# Patient Record
Sex: FEMALE | Race: BLACK OR AFRICAN AMERICAN | NOT HISPANIC OR LATINO | Employment: FULL TIME | URBAN - METROPOLITAN AREA
[De-identification: names, ages, dates, MRNs, and addresses within clinical notes are randomized per-mention and may not be internally consistent; named-entity substitution may affect disease eponyms.]

---

## 2024-02-05 ENCOUNTER — APPOINTMENT (OUTPATIENT)
Dept: GENERAL RADIOLOGY | Facility: HOSPITAL | Age: 54
End: 2024-02-05
Payer: COMMERCIAL

## 2024-02-05 ENCOUNTER — HOSPITAL ENCOUNTER (EMERGENCY)
Facility: HOSPITAL | Age: 54
Discharge: HOME | End: 2024-02-05
Attending: EMERGENCY MEDICINE | Admitting: EMERGENCY MEDICINE
Payer: COMMERCIAL

## 2024-02-05 VITALS
OXYGEN SATURATION: 100 % | DIASTOLIC BLOOD PRESSURE: 75 MMHG | HEART RATE: 56 BPM | TEMPERATURE: 98.1 F | RESPIRATION RATE: 18 BRPM | HEIGHT: 65 IN | SYSTOLIC BLOOD PRESSURE: 141 MMHG | BODY MASS INDEX: 31.92 KG/M2 | WEIGHT: 191.58 LBS

## 2024-02-05 DIAGNOSIS — M54.42 ACUTE LEFT-SIDED LOW BACK PAIN WITH LEFT-SIDED SCIATICA: Primary | ICD-10-CM

## 2024-02-05 PROCEDURE — 96372 THER/PROPH/DIAG INJ SC/IM: CPT

## 2024-02-05 PROCEDURE — 99282 EMERGENCY DEPT VISIT SF MDM: CPT

## 2024-02-05 PROCEDURE — 73502 X-RAY EXAM HIP UNI 2-3 VIEWS: CPT

## 2024-02-05 PROCEDURE — 25010000002 KETOROLAC TROMETHAMINE PER 15 MG

## 2024-02-05 RX ORDER — KETOROLAC TROMETHAMINE 30 MG/ML
60 INJECTION, SOLUTION INTRAMUSCULAR; INTRAVENOUS ONCE
Status: COMPLETED | OUTPATIENT
Start: 2024-02-05 | End: 2024-02-05

## 2024-02-05 RX ORDER — OXYCODONE AND ACETAMINOPHEN TABLETS 5; 300 MG/1; MG/1
1 TABLET ORAL EVERY 6 HOURS PRN
COMMUNITY

## 2024-02-05 RX ORDER — HYDROCHLOROTHIAZIDE 12.5 MG/1
12.5 TABLET ORAL DAILY
COMMUNITY

## 2024-02-05 RX ORDER — MORPHINE SULFATE 15 MG/1
15 TABLET ORAL EVERY 4 HOURS PRN
COMMUNITY

## 2024-02-05 RX ORDER — METHYLPREDNISOLONE 4 MG/1
TABLET ORAL
Qty: 21 TABLET | Refills: 0 | Status: SHIPPED | OUTPATIENT
Start: 2024-02-05 | End: 2024-02-10

## 2024-02-05 RX ORDER — TRIAMCINOLONE ACETONIDE 5 MG/G
1 CREAM TOPICAL 3 TIMES DAILY
COMMUNITY

## 2024-02-05 RX ADMIN — KETOROLAC TROMETHAMINE 60 MG: 30 INJECTION, SOLUTION INTRAMUSCULAR at 18:43

## 2024-02-05 NOTE — ED PROVIDER NOTES
Time: 5:09 PM EST  Date of encounter:  2024  Independent Historian/Clinical History and Information was obtained by:   Patient    History is limited by: N/A    Chief Complaint   Patient presents with    Leg Pain     left hip and leg pain causing numbness today. denies any trauma.pt ambulating in triage.          History of Present Illness:  Patient is a 54 y.o. year old female who presents to the emergency department for evaluation of left hip pain and lower back pain that started today.  Patient states the pain radiates down the posterior leg down into her foot.  Has history of herniated disc.  denies fall or injury.  Patient denies numbness or tingling, no saddle anesthesia.      Patient Care Team  Primary Care Provider: Provider, No Known    Past Medical History:     No Known Allergies  Past Medical History:   Diagnosis Date    Chronic pain     Eczema     Hypertension      Past Surgical History:   Procedure Laterality Date     SECTION      HYSTERECTOMY       No family history on file.    Home Medications:  Prior to Admission medications    Not on File        Social History:          Review of Systems:  Review of Systems   Constitutional:  Negative for fever.   HENT:  Negative for sore throat.    Eyes: Negative.    Respiratory:  Negative for cough and shortness of breath.    Cardiovascular:  Negative for chest pain.   Gastrointestinal:  Negative for abdominal pain, diarrhea and vomiting.   Genitourinary:  Negative for dysuria.   Musculoskeletal:  Positive for arthralgias and back pain. Negative for neck pain.   Skin:  Negative for rash.   Allergic/Immunologic: Negative.    Neurological:  Negative for weakness, numbness and headaches.   Hematological: Negative.    Psychiatric/Behavioral: Negative.     All other systems reviewed and are negative.       Physical Exam:  /75 (BP Location: Left arm, Patient Position: Sitting)   Pulse 56   Temp 98.1 °F (36.7 °C) (Oral)   Resp 18   Ht 163.8 cm  "(64.5\")   Wt 86.9 kg (191 lb 9.3 oz)   SpO2 100%   BMI 32.38 kg/m²         Physical Exam  Vitals and nursing note reviewed.   Constitutional:       General: She is not in acute distress.     Appearance: Normal appearance. She is not toxic-appearing.   HENT:      Head: Normocephalic and atraumatic.      Jaw: There is normal jaw occlusion.      Mouth/Throat:      Mouth: Mucous membranes are moist.   Eyes:      General: Lids are normal.      Extraocular Movements: Extraocular movements intact.      Conjunctiva/sclera: Conjunctivae normal.      Pupils: Pupils are equal, round, and reactive to light.   Cardiovascular:      Rate and Rhythm: Normal rate and regular rhythm.      Pulses: Normal pulses.      Heart sounds: Normal heart sounds.   Pulmonary:      Effort: Pulmonary effort is normal. No respiratory distress.      Breath sounds: Normal breath sounds. No wheezing or rhonchi.   Abdominal:      General: Abdomen is flat. There is no distension.      Palpations: Abdomen is soft.      Tenderness: There is no abdominal tenderness. There is no guarding or rebound.   Musculoskeletal:         General: Normal range of motion.      Cervical back: Normal range of motion and neck supple.      Lumbar back: Tenderness present. No signs of trauma.      Left hip: Tenderness present. No deformity.      Right lower leg: No edema.      Left lower leg: No edema.   Skin:     General: Skin is warm and dry.   Neurological:      General: No focal deficit present.      Mental Status: She is alert and oriented to person, place, and time. Mental status is at baseline.   Psychiatric:         Mood and Affect: Mood normal.         Behavior: Behavior normal.                Procedures:  Procedures      Medical Decision Making:      Comorbidities that affect care:    Hypertension    External Notes reviewed:          The following orders were placed and all results were independently analyzed by me:  Orders Placed This Encounter   Procedures    XR " Hip With or Without Pelvis 2 - 3 View Left       Medications Given in the Emergency Department:  Medications   ketorolac (TORADOL) injection 60 mg (60 mg Intramuscular Given 2/5/24 1843)        ED Course:    The patient was initially evaluated in the triage area where orders were placed. The patient was later dispositioned by DENNIS Lira.      The patient was advised to stay for completion of workup which includes but is not limited to communication of labs and radiological results, reassessment and plan. The patient was advised that leaving prior to disposition by a provider could result in critical findings that are not communicated to the patient.     ED Course as of 02/05/24 2003 Mon Feb 05, 2024   1710 --- PROVIDER IN TRIAGE NOTE ---    The patient was evaluated by Francy galindo in triage. Orders were placed and the patient is currently awaiting disposition.    [AJ]      ED Course User Index  [AJ] Francy Morris PA-C       Labs:    Lab Results (last 24 hours)       ** No results found for the last 24 hours. **             Imaging:    XR Hip With or Without Pelvis 2 - 3 View Left    Result Date: 2/5/2024  PROCEDURE: XR HIP W OR WO PELVIS 2-3 VIEW LEFT  COMPARISON: None  INDICATIONS: pain x 1 day / pt denies injury or trauma / no surgery to [facility]  FINDINGS:  The sacrum, pelvis, and proximal femurs appear intact.  No fractures are visualized.  Mild to moderate degenerative change consistent with osteoarthritis is seen in the left hip.  Mild degenerative change is seen in the right hip.  No lytic or sclerotic bone lesions are seen.        Left hip series demonstrating mild to moderate degenerative change consistent with osteoarthritis.      DEN RUIZ MD       Electronically Signed and Approved By: DEN RUIZ MD on 2/05/2024 at 18:27                Differential Diagnosis and Discussion:      Back Pain: The patient presents with back pain. My differential diagnosis includes but is not  limited to acute spinal epidural abscess, acute spinal epidural bleed, cauda equina syndrome, abdominal aortic aneurysm, aortic dissection, kidney stone, pyelonephritis, musculoskeletal back pain, spinal fracture, and osteoarthritis.   Extremity Pain: Differential diagnosis includes but is not limited to soft tissue sprain, tendonitis, tendon injury, dislocation, fracture, deep vein thrombosis, arterial insufficiency, osteoarthritis, bursitis, and ligamentous damage.    All X-rays impressions were independently interpreted by me.    MDM     Amount and/or Complexity of Data Reviewed  Tests in the radiology section of CPT®: reviewed    The patient´s symptoms are consistent with musculoskeletal back pain. The patient is now resting comfortably, feels better, is alert, talkative, interactive and in no distress. The repeat examination is unremarkable and benign. The patient is neurologically intact and is ambulatory in the ED. The patient has no fever, no bowel or bladder incontinence, no saddle anesthesia, and is otherwise alert and well appearing. The history, physical exam, and diagnostics (if any) do not suggest the presence of acute spinal epidural abscess, acute spinal epidural bleed, cauda equina syndrome, abdominal aortic aneurysm, aortic dissection or other process requiring further testing, treatment or consultation in the emergency department. The vital signs have been stable. The patient's condition is stable and appropriate for discharge. The patient will pursue further outpatient evaluation with the primary care physician or other designated for consulting position as indicated in the discharge instructions.            Patient Care Considerations:    CONSULT: I considered consulting orthopedics, however no acute complications.      Consultants/Shared Management Plan:    None    Social Determinants of Health:    Patient is independent, reliable, and has access to care.       Disposition and Care  Coordination:    Discharged: The patient is suitable and stable for discharge with no need for consideration of admission.    I have explained the patient´s condition, diagnoses and treatment plan based on the information available to me at this time. I have answered questions and addressed any concerns. The patient has a good  understanding of the patient´s diagnosis, condition, and treatment plan as can be expected at this point. The vital signs have been stable. The patient´s condition is stable and appropriate for discharge from the emergency department.      The patient will pursue further outpatient evaluation with the primary care physician or other designated or consulting physician as outlined in the discharge instructions. They are agreeable to this plan of care and follow-up instructions have been explained in detail. The patient has received these instructions in written format and have expressed an understanding of the discharge instructions. The patient is aware that any significant change in condition or worsening of symptoms should prompt an immediate return to this or the closest emergency department or call to 911.  I have explained discharge medications and the need for follow up with the patient/caretakers. This was also printed in the discharge instructions. Patient was discharged with the following medications and follow up:      Medication List        New Prescriptions      diclofenac 50 MG EC tablet  Commonly known as: VOLTAREN  Take 1 tablet by mouth 3 (Three) Times a Day.     methylPREDNISolone 4 MG dose pack  Commonly known as: MEDROL  Take 6 tablets by mouth Daily for 1 day, THEN 5 tablets Daily for 1 day, THEN 4 tablets Daily for 1 day, THEN 3 tablets Daily for 1 day, THEN 2 tablets Daily for 1 day, THEN 1 tablet Daily for 1 day. Take as directed on package instructions.  Start taking on: February 5, 2024               Where to Get Your Medications        These medications were sent to  [facility] [id number] - [place], [place] - [address] AT [facility] [address] - [phone]  - [phone] FX  [address], [place] [address]      Phone: [phone]   diclofenac 50 MG EC tablet  methylPREDNISolone 4 MG dose pack      Provider, No Known  [facility]  [place] [address]    Call in 2 days         Final diagnoses:   Acute left-sided low back pain with left-sided sciatica        ED Disposition       ED Disposition   Discharge    Condition   Stable    Comment   --               This medical record created using voice recognition software.             Mary Jones APRN  02/05/24 2003

## 2024-02-29 ENCOUNTER — OFFICE VISIT (OUTPATIENT)
Dept: FAMILY MEDICINE CLINIC | Facility: CLINIC | Age: 54
End: 2024-02-29
Payer: COMMERCIAL

## 2024-02-29 ENCOUNTER — LAB (OUTPATIENT)
Dept: LAB | Facility: HOSPITAL | Age: 54
End: 2024-02-29
Payer: COMMERCIAL

## 2024-02-29 VITALS
DIASTOLIC BLOOD PRESSURE: 96 MMHG | HEART RATE: 43 BPM | HEIGHT: 65 IN | SYSTOLIC BLOOD PRESSURE: 140 MMHG | WEIGHT: 194.8 LBS | BODY MASS INDEX: 32.46 KG/M2 | OXYGEN SATURATION: 100 %

## 2024-02-29 DIAGNOSIS — I10 PRIMARY HYPERTENSION: ICD-10-CM

## 2024-02-29 DIAGNOSIS — Z13.29 SCREENING FOR THYROID DISORDER: ICD-10-CM

## 2024-02-29 DIAGNOSIS — Z11.59 NEED FOR HEPATITIS C SCREENING TEST: ICD-10-CM

## 2024-02-29 DIAGNOSIS — Z13.220 SCREENING FOR LIPID DISORDERS: ICD-10-CM

## 2024-02-29 DIAGNOSIS — Z00.00 ANNUAL PHYSICAL EXAM: Primary | ICD-10-CM

## 2024-02-29 DIAGNOSIS — Z12.31 ENCOUNTER FOR SCREENING MAMMOGRAM FOR MALIGNANT NEOPLASM OF BREAST: ICD-10-CM

## 2024-02-29 DIAGNOSIS — Z76.89 ENCOUNTER TO ESTABLISH CARE: ICD-10-CM

## 2024-02-29 DIAGNOSIS — Z00.00 ANNUAL PHYSICAL EXAM: ICD-10-CM

## 2024-02-29 DIAGNOSIS — G89.29 OTHER CHRONIC PAIN: ICD-10-CM

## 2024-02-29 DIAGNOSIS — Z12.11 SCREENING FOR COLON CANCER: ICD-10-CM

## 2024-02-29 LAB
ALBUMIN SERPL-MCNC: 4.2 G/DL (ref 3.5–5.2)
ALBUMIN/GLOB SERPL: 1.9 G/DL
ALP SERPL-CCNC: 48 U/L (ref 39–117)
ALT SERPL W P-5'-P-CCNC: 13 U/L (ref 1–33)
ANION GAP SERPL CALCULATED.3IONS-SCNC: 16 MMOL/L (ref 5–15)
AST SERPL-CCNC: 21 U/L (ref 1–32)
BASOPHILS # BLD AUTO: 0.04 10*3/MM3 (ref 0–0.2)
BASOPHILS NFR BLD AUTO: 0.8 % (ref 0–1.5)
BILIRUB SERPL-MCNC: 0.5 MG/DL (ref 0–1.2)
BUN SERPL-MCNC: 23 MG/DL (ref 6–20)
BUN/CREAT SERPL: 22.5 (ref 7–25)
CALCIUM SPEC-SCNC: 8.8 MG/DL (ref 8.6–10.5)
CHLORIDE SERPL-SCNC: 104 MMOL/L (ref 98–107)
CHOLEST SERPL-MCNC: 153 MG/DL (ref 0–200)
CO2 SERPL-SCNC: 23 MMOL/L (ref 22–29)
CREAT SERPL-MCNC: 1.02 MG/DL (ref 0.57–1)
DEPRECATED RDW RBC AUTO: 42.7 FL (ref 37–54)
EGFRCR SERPLBLD CKD-EPI 2021: 65.5 ML/MIN/1.73
EOSINOPHIL # BLD AUTO: 0.45 10*3/MM3 (ref 0–0.4)
EOSINOPHIL NFR BLD AUTO: 8.8 % (ref 0.3–6.2)
ERYTHROCYTE [DISTWIDTH] IN BLOOD BY AUTOMATED COUNT: 12.7 % (ref 12.3–15.4)
GLOBULIN UR ELPH-MCNC: 2.2 GM/DL
GLUCOSE SERPL-MCNC: 92 MG/DL (ref 65–99)
HCT VFR BLD AUTO: 36.2 % (ref 34–46.6)
HCV AB SER DONR QL: NORMAL
HDLC SERPL-MCNC: 61 MG/DL (ref 40–60)
HGB BLD-MCNC: 11.9 G/DL (ref 12–15.9)
IMM GRANULOCYTES # BLD AUTO: 0.01 10*3/MM3 (ref 0–0.05)
IMM GRANULOCYTES NFR BLD AUTO: 0.2 % (ref 0–0.5)
LDLC SERPL CALC-MCNC: 84 MG/DL (ref 0–100)
LDLC/HDLC SERPL: 1.4 {RATIO}
LYMPHOCYTES # BLD AUTO: 2.07 10*3/MM3 (ref 0.7–3.1)
LYMPHOCYTES NFR BLD AUTO: 40.4 % (ref 19.6–45.3)
MCH RBC QN AUTO: 30.3 PG (ref 26.6–33)
MCHC RBC AUTO-ENTMCNC: 32.9 G/DL (ref 31.5–35.7)
MCV RBC AUTO: 92.1 FL (ref 79–97)
MONOCYTES # BLD AUTO: 0.43 10*3/MM3 (ref 0.1–0.9)
MONOCYTES NFR BLD AUTO: 8.4 % (ref 5–12)
NEUTROPHILS NFR BLD AUTO: 2.13 10*3/MM3 (ref 1.7–7)
NEUTROPHILS NFR BLD AUTO: 41.4 % (ref 42.7–76)
NRBC BLD AUTO-RTO: 0 /100 WBC (ref 0–0.2)
PLATELET # BLD AUTO: 213 10*3/MM3 (ref 140–450)
PMV BLD AUTO: 12.1 FL (ref 6–12)
POTASSIUM SERPL-SCNC: 4.1 MMOL/L (ref 3.5–5.2)
PROT SERPL-MCNC: 6.4 G/DL (ref 6–8.5)
RBC # BLD AUTO: 3.93 10*6/MM3 (ref 3.77–5.28)
SODIUM SERPL-SCNC: 143 MMOL/L (ref 136–145)
T4 FREE SERPL-MCNC: 1.26 NG/DL (ref 0.93–1.7)
TRIGL SERPL-MCNC: 34 MG/DL (ref 0–150)
TSH SERPL DL<=0.05 MIU/L-ACNC: 0.51 UIU/ML (ref 0.27–4.2)
VLDLC SERPL-MCNC: 8 MG/DL (ref 5–40)
WBC NRBC COR # BLD AUTO: 5.13 10*3/MM3 (ref 3.4–10.8)

## 2024-02-29 PROCEDURE — 36415 COLL VENOUS BLD VENIPUNCTURE: CPT

## 2024-02-29 PROCEDURE — 86803 HEPATITIS C AB TEST: CPT

## 2024-02-29 PROCEDURE — 84439 ASSAY OF FREE THYROXINE: CPT

## 2024-02-29 PROCEDURE — 80050 GENERAL HEALTH PANEL: CPT

## 2024-02-29 PROCEDURE — 80061 LIPID PANEL: CPT

## 2024-02-29 RX ORDER — HYDROCHLOROTHIAZIDE 25 MG/1
25 TABLET ORAL DAILY
Qty: 30 TABLET | Refills: 0 | Status: SHIPPED | OUTPATIENT
Start: 2024-02-29

## 2024-02-29 RX ORDER — HYDROCHLOROTHIAZIDE 12.5 MG/1
12.5 TABLET ORAL DAILY
Qty: 30 TABLET | Refills: 0 | Status: SHIPPED | OUTPATIENT
Start: 2024-02-29 | End: 2024-02-29 | Stop reason: SDUPTHER

## 2024-03-13 ENCOUNTER — HOSPITAL ENCOUNTER (OUTPATIENT)
Dept: MAMMOGRAPHY | Facility: HOSPITAL | Age: 54
Discharge: HOME | End: 2024-03-13
Payer: COMMERCIAL

## 2024-03-13 DIAGNOSIS — Z00.00 ANNUAL PHYSICAL EXAM: ICD-10-CM

## 2024-03-13 DIAGNOSIS — Z12.31 ENCOUNTER FOR SCREENING MAMMOGRAM FOR MALIGNANT NEOPLASM OF BREAST: ICD-10-CM

## 2024-03-13 PROCEDURE — 77063 BREAST TOMOSYNTHESIS BI: CPT

## 2024-03-13 PROCEDURE — 77067 SCR MAMMO BI INCL CAD: CPT

## 2024-03-15 DIAGNOSIS — R92.8 ABNORMALITY OF RIGHT BREAST ON SCREENING MAMMOGRAM: Primary | ICD-10-CM

## 2024-03-27 ENCOUNTER — OFFICE VISIT (OUTPATIENT)
Dept: FAMILY MEDICINE CLINIC | Facility: CLINIC | Age: 54
End: 2024-03-27
Payer: COMMERCIAL

## 2024-03-27 VITALS
OXYGEN SATURATION: 98 % | DIASTOLIC BLOOD PRESSURE: 84 MMHG | WEIGHT: 190 LBS | BODY MASS INDEX: 31.65 KG/M2 | HEIGHT: 65 IN | HEART RATE: 64 BPM | SYSTOLIC BLOOD PRESSURE: 125 MMHG

## 2024-03-27 DIAGNOSIS — M79.675 PAIN IN TOE OF LEFT FOOT: ICD-10-CM

## 2024-03-27 DIAGNOSIS — G47.00 INSOMNIA, UNSPECIFIED TYPE: ICD-10-CM

## 2024-03-27 DIAGNOSIS — I10 PRIMARY HYPERTENSION: Primary | ICD-10-CM

## 2024-03-27 DIAGNOSIS — G47.9 RESTLESS SLEEPER: ICD-10-CM

## 2024-03-27 DIAGNOSIS — L60.8 TOENAIL DEFORMITY: ICD-10-CM

## 2024-03-27 PROCEDURE — 99214 OFFICE O/P EST MOD 30 MIN: CPT

## 2024-03-27 RX ORDER — HYDROCHLOROTHIAZIDE 25 MG/1
25 TABLET ORAL DAILY
Qty: 90 TABLET | Refills: 1 | Status: SHIPPED | OUTPATIENT
Start: 2024-03-27

## 2024-03-27 NOTE — PROGRESS NOTES
Chief Complaint  Hypertension, Toe Pain, and sleep issues    SUBJECTIVE  Sigrid Morgan presents to [facility]    History of Present Illness  Patient is here for 1m f/u for htn, pt was instructed to restart hctz 25 mg and check bp at home. Pt states that she is doing well at this time. BP in office today 125/84.  Denies any chest pain, shortness of air, headache, blurry vision.    Patient reports complaints of left great toe pain that has been there for a few days.  Patient reports she had a history of fracture in that foot and has always had some numbness and tingling in her foot and toe but her toenail has since turned black and is tender to the touch.  Patient denies any known injury.    Patient also reports that she has been consistent restless sleep.  Patient reports she is usually waking up about every 2 hours.  Patient reports has been checking her sleep on her smart watch and it is a score of 30.  Patient has never had sleep study done.  Patient denies any known snoring.    She has scheduled her mammogram and colonoscopy.  She has an appointment to establish with pain management today.        Past Medical History:   Diagnosis Date    Chronic pain     Eczema     Hypertension       History reviewed. No pertinent family history.   Past Surgical History:   Procedure Laterality Date     SECTION      HYSTERECTOMY          Current Outpatient Medications:     diclofenac (VOLTAREN) 50 MG EC tablet, Take 1 tablet by mouth 3 (Three) Times a Day., Disp: 180 tablet, Rfl: 0    hydroCHLOROthiazide 25 MG tablet, Take 1 tablet by mouth Daily., Disp: 30 tablet, Rfl: 0    Morphine (MSIR) 15 MG tablet, Take 1 tablet by mouth Every 4 (Four) Hours As Needed for Severe Pain. Pt not currently taking, Disp: , Rfl:     oxyCODONE-acetaminophen (LYNOX) 5-300 MG per tablet, Take 1 tablet by mouth Every 6 (Six) Hours As Needed for Severe Pain. Pt not currently taking, Disp: , Rfl:      "triamcinolone (KENALOG) 0.5 % cream, Apply 1 Application topically to the appropriate area as directed 3 (Three) Times a Day., Disp: , Rfl:     OBJECTIVE  Vital Signs:   /84 (BP Location: Left arm, Patient Position: Sitting, Cuff Size: Adult)   Pulse 64   Ht 163.8 cm (64.5\")   Wt 86.2 kg (190 lb)   SpO2 98%   BMI 32.11 kg/m²    Estimated body mass index is 32.11 kg/m² as calculated from the following:    Height as of this encounter: 163.8 cm (64.5\").    Weight as of this encounter: 86.2 kg (190 lb).     Wt Readings from Last 3 Encounters:   03/27/24 86.2 kg (190 lb)   02/29/24 88.4 kg (194 lb 12.8 oz)   02/05/24 86.9 kg (191 lb 9.3 oz)     BP Readings from Last 3 Encounters:   03/27/24 125/84   02/29/24 140/96   02/05/24 141/75       Physical Exam  Vitals reviewed.   Constitutional:       General: She is not in acute distress.     Appearance: She is not ill-appearing.   HENT:      Head: Normocephalic and atraumatic.   Eyes:      Conjunctiva/sclera: Conjunctivae normal.   Cardiovascular:      Rate and Rhythm: Normal rate and regular rhythm.      Heart sounds: Normal heart sounds.   Pulmonary:      Effort: Pulmonary effort is normal.      Breath sounds: Normal breath sounds.   Musculoskeletal:        Feet:    Feet:      Comments: Left great toenail black and tender to touch, no swelling or erythema noted  Skin:     General: Skin is warm and dry.   Neurological:      Mental Status: She is alert and oriented to person, place, and time.   Psychiatric:         Mood and Affect: Mood normal.         Behavior: Behavior normal.         Thought Content: Thought content normal.         Judgment: Judgment normal.          Result Review    Common labs          2/29/2024    08:24   Common Labs   Glucose 92    BUN 23    Creatinine 1.02    Sodium 143    Potassium 4.1    Chloride 104    Calcium 8.8    Albumin 4.2    Total Bilirubin 0.5    Alkaline Phosphatase 48    AST (SGOT) 21    ALT (SGPT) 13    WBC 5.13    Hemoglobin " 11.9    Hematocrit 36.2    Platelets 213    Total Cholesterol 153    Triglycerides 34    HDL Cholesterol 61    LDL Cholesterol  84        Mammo Screening Digital Tomosynthesis Bilateral With CAD    Result Date: 3/14/2024   Right breast asymmetries.   We have requested the patient's prior, outside mammography, an addendum documenting comparison will be issued when this is made available.  If this cannot be made available, then further evaluation with right diagnostic mammogram and possible right breast ultrasound would be recommended.   RECOMMENDATION(S):  COMPARISON TO PRIOR EXAMS REQUIRED. --An addendum report will be sent after prior exams are obtained and reviewed.   ADDITIONAL MAMMOGRAPHIC VIEWS REQUIRED: RIGHT BREAST  --NEED ADDITIONAL IMAGING EVALUATION.   ULTRASOUND: RIGHT BREAST  --NEED ADDITIONAL IMAGING EVALUATION.   BIRADS:  DIAGNOSTIC CATEGORY 0--INCOMPLETE: NEEDS ADDITIONAL IMAGING EVALUATION.   BREAST COMPOSITION: Heterogeneously dense,which may obscure small masses.  PLEASE NOTE:  A NORMAL MAMMOGRAM DOES NOT EXCLUDE THE POSSIBILITY OF BREAST CANCER. ANY CLINICALLY SUSPICIOUS PALPABLE LUMP SHOULD BE BIOPSIED.      OMARI GUNDERSON MD       Electronically Signed and Approved By: OMARI GUNDERSON MD on 3/14/2024 at 12:33             XR Hip With or Without Pelvis 2 - 3 View Left    Result Date: 2/5/2024    Left hip series demonstrating mild to moderate degenerative change consistent with osteoarthritis.      DEN RUIZ MD       Electronically Signed and Approved By: DEN RUIZ MD on 2/05/2024 at 18:27                 The above data has been reviewed by DENNIS Quinonez 03/27/2024 07:03 EDT.          Patient Care Team:  Camilla Mena APRN as PCP - General (Nurse Practitioner)            ASSESSMENT & PLAN    Diagnoses and all orders for this visit:    1. Primary hypertension (Primary)  Comments:  Stable on hydrochlorothiazide 25 mg, continue    2. Insomnia, unspecified type  -     Ambulatory  Referral to Sleep Medicine    3. Restless sleeper  -     Ambulatory Referral to Sleep Medicine    4. Pain in toe of left foot  -     Ambulatory Referral to Podiatry    5. Toenail deformity  -     Ambulatory Referral to Podiatry    Will refer to podiatry for evaluation of her toenail.  Will refer to sleep medicine for evaluation of restless sleep, potential sleep apnea.    Tobacco Use: High Risk (3/27/2024)    Patient History     Smoking Tobacco Use: Some Days     Smokeless Tobacco Use: Never     Passive Exposure: Current       Follow Up     Return in about 6 months (around 9/27/2024) for Next scheduled follow up.      Patient was given instructions and counseling regarding her condition or for health maintenance advice. Please see specific information pulled into the AVS if appropriate.   I have reviewed information obtained and documented by others and I have confirmed the accuracy of this documented note.    DENNIS Quinonez

## 2024-03-29 ENCOUNTER — OFFICE VISIT (OUTPATIENT)
Dept: SLEEP MEDICINE | Facility: HOSPITAL | Age: 54
End: 2024-03-29
Payer: COMMERCIAL

## 2024-03-29 VITALS
HEART RATE: 49 BPM | OXYGEN SATURATION: 99 % | HEIGHT: 65 IN | SYSTOLIC BLOOD PRESSURE: 125 MMHG | DIASTOLIC BLOOD PRESSURE: 77 MMHG | WEIGHT: 192.3 LBS | BODY MASS INDEX: 32.04 KG/M2

## 2024-03-29 DIAGNOSIS — F12.90 MARIJUANA USE: ICD-10-CM

## 2024-03-29 DIAGNOSIS — Z72.821 INADEQUATE SLEEP HYGIENE: ICD-10-CM

## 2024-03-29 DIAGNOSIS — R06.83 SNORING: ICD-10-CM

## 2024-03-29 DIAGNOSIS — R00.1 BRADYCARDIA: ICD-10-CM

## 2024-03-29 DIAGNOSIS — G89.29 OTHER CHRONIC PAIN: ICD-10-CM

## 2024-03-29 DIAGNOSIS — I10 ESSENTIAL HYPERTENSION: ICD-10-CM

## 2024-03-29 DIAGNOSIS — R06.81 WITNESSED EPISODE OF APNEA: ICD-10-CM

## 2024-03-29 DIAGNOSIS — R29.818 SUSPECTED SLEEP APNEA: Primary | ICD-10-CM

## 2024-03-29 DIAGNOSIS — E66.9 CLASS 1 OBESITY WITH BODY MASS INDEX (BMI) OF 32.0 TO 32.9 IN ADULT, UNSPECIFIED OBESITY TYPE, UNSPECIFIED WHETHER SERIOUS COMORBIDITY PRESENT: ICD-10-CM

## 2024-03-29 DIAGNOSIS — G47.19 EXCESSIVE DAYTIME SLEEPINESS: ICD-10-CM

## 2024-03-29 PROCEDURE — G0463 HOSPITAL OUTPT CLINIC VISIT: HCPCS

## 2024-03-29 RX ORDER — PREGABALIN 75 MG/1
CAPSULE ORAL
COMMUNITY
Start: 2024-03-27

## 2024-03-29 RX ORDER — DIPHENHYDRAMINE HCL 25 MG
25 CAPSULE ORAL EVERY 6 HOURS PRN
COMMUNITY

## 2024-03-29 RX ORDER — AMOXICILLIN 500 MG/1
CAPSULE ORAL
COMMUNITY
Start: 2024-03-29

## 2024-03-29 NOTE — PROGRESS NOTES
"  [facility]  [address] [place] [id number]  Phone: [phone]  Fax: [phone]      Sigrid Morgan  [id number]   1970  54 y.o.  female      Referring physician/provider and  PCP Camilla Mena APRN    Type of service: Initial Sleep Medicine Consult.  Date of service: 3/29/2024      Chief Complaint   Patient presents with    Witnessed Apnea    Snoring       History of present illness;  The patient was seen today on 3/29/2024 at [facility].    Thank you for asking to see Sigrid Morgan, 54 y.o. PMHx HTN, chronic pain ( lyrica 75 mg 1-2x today and medical marijuana - lifetime marijuana use).  The patient presents for initial evaluation of sleep sleep disordered breathing.  Patient  denies prior surgery namely tonsillectomy, nasal surgery or UPPP.       Loud snoring and witnessed apneas >1 year  Never had sleep study    Bradycardia in sleep clinic - states she feels good today no issues:   [facility] sleep clinic  /77   Pulse (!) 49   Ht 163.8 cm (64.5\")   Wt 87.2 kg (192 lb 4.8 oz)   SpO2 99%   BMI 32.50 kg/m²   Denies lightheadedness/dizziness/syncope/near syncope/vision changes  Denies any beta blocker therapy    -Recently moved from [place]: In the past she was on Morphine IR 15 mg q4hr and Oxycodone-APAP q6hr however no longer on any form of opoid therapy  Saw pain management recently and stated on lyrica 75 mg 1-2x/d   Denies any current opoid therapy    Denies any known cardiopulmonary conditions/neurologic disorders/neuromuscular disorders  Denies ever needing supplemental O2 at home  Denies any metal in head/neck/chest    Obstructive Sleep Apnea Screening: STOP-BANG Sleep Apnea Questionnaire. Reference: Ding F et al. Br J Anaesth, 2012.     Criterion    Yes    No  Do you SNORE loudly?   [x]   Yes  []   No   Do you often feel TIRED, fatigued, or sleepy during the day?    [x]   Yes  []   No  Has anyone OBSERVED you stop breathing during your " sleep?    [x]   Yes  []   No  Do you have or are you being treated for high blood PRESSURE?    [x]   Yes  []   No  BMI >32 kg/m2     [x]   Yes  []   No  AGE > 50 years    [x]   Yes  []   No  NECK circumference >16 inches / 40 cm    []   Yes  [x]   No  GENDER: male     []   Yes  [x]   No    IVET Probability:  []   1-2 - Low  []   3-4 - Intermediate  [x]   5-8 - High      Further Sleep History:    Bedtime: Weekdays 11 PM-midnight; weekends 9:30 PM-10 PM  Rise time: Weekdays 7-8 AM weekends 8-9 AM  Sleep latency: 30 minutes  Naps: Denies  Caffeine use: 2+ beverages per day      RLS Symptoms: No   Bruxism:No   Current sleep related gastroesophageal reflux symptoms:  No   Cataplexy:  No   Sleep Paralysis:  No   Hypnagogic or hypnopompic hallucinations: No   Parasomnias such as sleep walking or sleep eating No     Disclaimer Sleep History: The above sleep history is based on this sleep physician's in room encounter with the patient. Pre encounter self administered questionnaires are taken into consideration and discussed with patient for any discordance. The above documentation by this sleep physician is the most accurate clinical information determined by in room sleep physician encounter with patient.     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 Pre encounter self administered questionnaires are taken into consideration and discussed with patient for any discordance. The above documentation by this sleep physician is the most accurate clinical information determined by in room sleep physician encounter with patient.     Physical exam:  Vitals:    03/29/24 0900   BP: 125/77   Pulse: (!) 49   SpO2: 99%   Weight: 87.2 kg (192 lb 4.8 oz)   Height: 163.8 cm (64.5\")    Body mass index is 32.5 kg/m².   CONSTITUTIONAL:  Non-toxic, In no overt distress   Head: normocephalic   ENT: Mallampati class IV, + macroglossia, no septal defects   NECK:Neck Circumference: 14 inches,no nuchal rigidity  RESPIRATORY SYSTEM: Breath sounds are clear (no rales, no rhonchi, no wheezes), no accessory muscle use  CARDIOVASULAR SYSTEM: Heart sounds are regular rhythm and bradycardicl rate (peripheral pulse 58 bpm), no rub, no gallop, no edema  NEUROLOGICAL SYSTEM: Oriented x 3, No gross focal deficits   PSYCHIATRIC SYSTEM: Pleasant, Goal oriented, affect full range appropriate      Office notes from care team reviewed:    -3/27/2024 office visit PCP DENNIS Mena sleep maintenance issue, never had sleep study done.  BMI 32.11 kg/m² that visit.  Has an appointment to establish care with pain management.      Labs reviewed.  TSH          2/29/2024    08:24   TSH   TSH 0.505        - 2/29/2024  Bicarb 23      Assessment and plan:  Suspected sleep apnea [R29.818] patient's symptoms and examination is consistent with sleep apnea (G47.30). I have talked to the patient about the signs and symptoms of sleep apnea. In addition, I have also discussed pathophysiology of sleep apnea.  I also discussed the complications of untreated sleep apnea including effects on hypertension, diabetes mellitus and nonrestorative sleep with hypersomnia which can increase risk for motor vehicle accidents.  Untreated sleep apnea is also a risk factor for " development of atrial fibrillation, hypertension, insulin resistance and cerebrovascular accident.  Discussed in detail of various testing methods including home-based and lab based sleep studies.  Based on history and physical examination and other comorbidities the most appropriate study is Home Sleep Study.  The order for the sleep study is placed in [email or web address].  The test will be scheduled after approval from insurance. Treatment and management will be discussed after the test is completed.  Home Sleep Study.  The order for the sleep study is placed in [email or web address].  The test will be scheduled after approval from insurance. Treatment and management will be discussed after the test is completed. High pretest probability STOP-BANG 6/8, macroglossia, Mallampati is IV.  Home sleep study may rule in sleep apnea.  However, under patient's specific clinical circumstances home sleep study may not rule out sleep apnea.  If home sleep testing is negative must proceed with in laboratory polysomnography to definitively rule out sleep apnea (the prior was discussed with patient). Patient was given opportunity to ask questions and all the questions were answered.   Snoring (R06.83), snoring is the sound created by turbulent airflow vibrating upper airway soft tissue due to limitation of inspiratory airflow. I have also discussed factors affecting snoring including sleep deprivation, sleeping on the back and alcohol ingestion. To minimize snoring, patient is advised to have adequate sleep, sleep on the side and avoid alcohol and sedative medications before bedtime  Excessive daytime sleepiness .  Patient endorses subjective excessive daytime sleepiness with sleep physician encounter which was consistent with patient's pre-encounter self-administered [place] Sleepiness Scale of Total score: 19.  There are many causes for daytime excessive sleepiness including depression, shiftwork syndrome, and other medical disorders including heart, kidney  and liver failure.  From sleep disorders perspective this is sleep disordered breathing until proven otherwise. The most common cause of excessive sleepiness is due to sleep apnea with frequent awakenings during sleep time.  I have discussed safety of driving and to remain vigilant while driving; patient verbalized understanding of counseling.  -Identifying and treating any sleep disordered breathing may help with the symptoms  -Patient is on sedating medications namely brought to her attention to discuss with her prescribing physicians risk/benefits if excessive daytime sleepiness persists despite ruling out or identify/treating any sleep disordered breathing: Lyrica and medical marijuana which has been identified by International classification of sleep disorders as a potential cause of excessive daytime sleepiness  Obesity, patient's BMI is Body mass index is 32.5 kg/m².. I have discussed the relationship between weight and sleep apnea.There is direct correlation between weight and severity of sleep apnea.  Weight reduction is encouraged, as it is going to reduce the severity of sleep apnea. I have also discussed with the patient diet and exercise to achieve ideal body weight.  Inadequate sleep hygiene [Z72.821]  Counseled the patient lifestyle modifications as below to be applied especially night of any sleep study, the patient verbalized understanding of same. Follow up with PCP to reinforce my counseling towards healthy lifestyle modifications if sleep studies are negative.  Bradycardia, asymptomatic in sleep clinic.  Counseled ED/911 for alarm signs symptoms reviewed with patient.  Counseled to follow-up with PCP for further investigation.  Not on beta-blocker therapy.  Chronic pain, no longer on any form of opioid therapy.  HST would be of most appropriate first evaluation.  Plan as stated above.  See excessive daytime sleepiness.  Follow-up with prescribing physicians to discuss risk/benefits of current  medications.  HTN, Follow up with primary care physician for continued management. This medical condition would make the patient eligible for a trial of PAP therapy even if sleep study reveals mild severity sleep apnea.      I have also discussed with the patient the following  Sleep hygiene: Maintaining a regular bedtime and wake time, not to watch television or work in bed, limit caffeine-containing beverages before bed time and avoid naps during the day  Adequate amount of sleep.  Generally most people needs about 7 to 8 hours of sleep.      Return for 31 to 90 days after PAP setup with down load.  Patient's questions were answered      I once again thank you for asking me to see this patient in consultation and I have forwarded my opinion and treatment plan.  Please do not hesitate to call me if you have any questions.       EMR Dragon/Transcription disclaimer:   Much of this encounter note is an electronic transcription/translation of spoken language to printed text. The electronic translation of spoken language may permit erroneous, or at times, nonsensical words or phrases to be inadvertently transcribed; Although I have reviewed the note for such errors, some may still exist.     NPI #: [id number]    Gustavo Rodriguez, DO  Sleep Medicine  [facility]  03/29/24

## 2024-04-02 ENCOUNTER — TRANSCRIBE ORDERS (OUTPATIENT)
Dept: ADMINISTRATIVE | Facility: HOSPITAL | Age: 54
End: 2024-04-02
Payer: COMMERCIAL

## 2024-04-02 DIAGNOSIS — M54.17 RADICULOPATHY, LUMBOSACRAL REGION: Primary | ICD-10-CM

## 2024-04-05 ENCOUNTER — TRANSCRIBE ORDERS (OUTPATIENT)
Dept: CT IMAGING | Facility: HOSPITAL | Age: 54
End: 2024-04-05
Payer: COMMERCIAL

## 2024-04-05 DIAGNOSIS — M54.12 RADICULOPATHY, CERVICAL REGION: Primary | ICD-10-CM

## 2024-04-10 ENCOUNTER — HOSPITAL ENCOUNTER (OUTPATIENT)
Dept: OTHER | Facility: HOSPITAL | Age: 54
Discharge: HOME | End: 2024-04-10

## 2024-04-22 ENCOUNTER — OFFICE VISIT (OUTPATIENT)
Dept: PODIATRY | Facility: CLINIC | Age: 54
End: 2024-04-22
Payer: COMMERCIAL

## 2024-04-22 VITALS
OXYGEN SATURATION: 95 % | BODY MASS INDEX: 32.49 KG/M2 | HEART RATE: 64 BPM | TEMPERATURE: 97.8 F | DIASTOLIC BLOOD PRESSURE: 78 MMHG | WEIGHT: 195 LBS | HEIGHT: 65 IN | SYSTOLIC BLOOD PRESSURE: 115 MMHG

## 2024-04-22 DIAGNOSIS — M79.2 NEURITIS: ICD-10-CM

## 2024-04-22 DIAGNOSIS — M19.172 POST-TRAUMATIC OSTEOARTHRITIS OF LEFT FOOT: Primary | ICD-10-CM

## 2024-04-22 PROCEDURE — 99203 OFFICE O/P NEW LOW 30 MIN: CPT | Performed by: PODIATRIST
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
## 2024-05-02 ENCOUNTER — TELEPHONE (OUTPATIENT)
Dept: FAMILY MEDICINE CLINIC | Facility: CLINIC | Age: 54
End: 2024-05-02
Payer: COMMERCIAL

## 2024-05-08 ENCOUNTER — HOSPITAL ENCOUNTER (OUTPATIENT)
Dept: MRI IMAGING | Facility: HOSPITAL | Age: 54
Discharge: HOME | End: 2024-05-08
Payer: COMMERCIAL

## 2024-05-08 DIAGNOSIS — M54.12 RADICULOPATHY, CERVICAL REGION: ICD-10-CM

## 2024-05-08 DIAGNOSIS — R29.818 SUSPECTED SLEEP APNEA: Primary | ICD-10-CM

## 2024-05-08 DIAGNOSIS — R06.81 WITNESSED EPISODE OF APNEA: ICD-10-CM

## 2024-05-08 DIAGNOSIS — M54.17 RADICULOPATHY, LUMBOSACRAL REGION: ICD-10-CM

## 2024-05-08 DIAGNOSIS — G47.19 EXCESSIVE DAYTIME SLEEPINESS: ICD-10-CM

## 2024-05-08 DIAGNOSIS — R06.83 SNORING: ICD-10-CM

## 2024-05-08 PROCEDURE — 72141 MRI NECK SPINE W/O DYE: CPT

## 2024-05-08 PROCEDURE — 72148 MRI LUMBAR SPINE W/O DYE: CPT

## 2024-05-13 ENCOUNTER — HOSPITAL ENCOUNTER (OUTPATIENT)
Dept: SLEEP MEDICINE | Facility: HOSPITAL | Age: 54
Discharge: HOME | End: 2024-05-13
Admitting: FAMILY MEDICINE
Payer: COMMERCIAL

## 2024-05-13 DIAGNOSIS — R06.83 SNORING: ICD-10-CM

## 2024-05-13 DIAGNOSIS — R29.818 SUSPECTED SLEEP APNEA: ICD-10-CM

## 2024-05-13 DIAGNOSIS — R06.81 WITNESSED EPISODE OF APNEA: ICD-10-CM

## 2024-05-13 DIAGNOSIS — G47.19 EXCESSIVE DAYTIME SLEEPINESS: ICD-10-CM

## 2024-05-13 PROCEDURE — 95806 SLEEP STUDY UNATT&RESP EFFT: CPT

## 2024-05-17 ENCOUNTER — TELEPHONE (OUTPATIENT)
Dept: FAMILY MEDICINE CLINIC | Facility: CLINIC | Age: 54
End: 2024-05-17

## 2024-05-17 NOTE — TELEPHONE ENCOUNTER
Caller: Sigrid Morgan    Relationship: Self    Best call back number: [phone]    What test was performed: MRI    When was the test performed: 5.8.24    Where was the test performed: [facility]    Additional notes:

## 2024-05-20 NOTE — TELEPHONE ENCOUNTER
CALLED PT ADVISED MRI NOT ORDERED BY PCP SHE WILL NEED TO CALL ORDERING PHYSICIAN OFFICE FOR RESULTS PT OK. PT STATES SHE WANTED TO MAKE PCP AWARE MRI OF HER SPINE SHOWS CYST ON HER KIDNEYS

## 2024-05-22 DIAGNOSIS — G47.33 OBSTRUCTIVE SLEEP APNEA, ADULT: Primary | ICD-10-CM

## 2024-05-22 DIAGNOSIS — I10 ESSENTIAL HYPERTENSION: ICD-10-CM

## 2024-05-23 ENCOUNTER — TELEPHONE (OUTPATIENT)
Dept: SLEEP MEDICINE | Facility: HOSPITAL | Age: 54
End: 2024-05-23
Payer: COMMERCIAL

## 2024-05-31 ENCOUNTER — TELEPHONE (OUTPATIENT)
Dept: FAMILY MEDICINE CLINIC | Facility: CLINIC | Age: 54
End: 2024-05-31
Payer: COMMERCIAL

## 2024-05-31 NOTE — TELEPHONE ENCOUNTER
Pt would like Mee to give her a call regarding her recent MRI's (done May 8, 2024) so that she can better understand the results

## 2024-06-07 ENCOUNTER — HOSPITAL ENCOUNTER (OUTPATIENT)
Dept: ULTRASOUND IMAGING | Facility: HOSPITAL | Age: 54
Discharge: HOME | End: 2024-06-07
Payer: COMMERCIAL

## 2024-06-07 ENCOUNTER — HOSPITAL ENCOUNTER (OUTPATIENT)
Dept: MAMMOGRAPHY | Facility: HOSPITAL | Age: 54
Discharge: HOME | End: 2024-06-07
Payer: COMMERCIAL

## 2024-06-07 DIAGNOSIS — R92.8 ABNORMALITY OF RIGHT BREAST ON SCREENING MAMMOGRAM: ICD-10-CM

## 2024-06-07 DIAGNOSIS — R92.8 ABNORMALITY OF RIGHT BREAST ON SCREENING MAMMOGRAM: Primary | ICD-10-CM

## 2024-06-07 PROCEDURE — 77065 DX MAMMO INCL CAD UNI: CPT

## 2024-06-07 PROCEDURE — 76642 ULTRASOUND BREAST LIMITED: CPT

## 2024-06-07 PROCEDURE — G0279 TOMOSYNTHESIS, MAMMO: HCPCS

## 2024-06-20 ENCOUNTER — TELEPHONE (OUTPATIENT)
Dept: FAMILY MEDICINE CLINIC | Facility: CLINIC | Age: 54
End: 2024-06-20
Payer: COMMERCIAL

## 2024-06-20 NOTE — TELEPHONE ENCOUNTER
Caller: Sigrid Morgan    Relationship: Self    Best call back number: [phone]    What medication are you requesting: MEDICATION TO HELP WITH PAIN     What are your current symptoms: PAIN IN NECK, HEADACHE     If a prescription is needed, what is your preferred pharmacy and phone number: [facility] [id number] - [place], [place] - [address] AT [facility] [address] - [phone] [facility] [phone] FX     Additional notes: PATIENT IS REQUESTING CALL BACK TO LET HER KNOW IF THERE IS ANYTHING THAT SHE CAN TAKE THAT WOULD HELP WITH THE PAIN THAT SHE IS HAVING DUE TO THE INJECTIONS SHE RECEIVED FROM THE PAIN CLINIC SHE WAS REFERRED TO.

## 2024-06-20 NOTE — TELEPHONE ENCOUNTER
Caller: Sigrid Morgan    Relationship to patient: Self    Best call back number: [phone]    Additional notes: PATIENT HAS NOT HEARD ANYTHING REGARDING THE ULTRASOUND APPOINTMENT SHE WAS SUPPOSED TO BE SCHEDULED FOR.

## 2024-06-25 DIAGNOSIS — G89.29 OTHER CHRONIC PAIN: ICD-10-CM

## 2024-06-27 ENCOUNTER — TELEPHONE (OUTPATIENT)
Dept: PODIATRY | Facility: CLINIC | Age: 54
End: 2024-06-27

## 2024-06-27 NOTE — TELEPHONE ENCOUNTER
Caller: Sigrid Morgan    Relationship to patient: Self    Best call back number: [phone]    Chief complaint: BILATERAL FOOT PAIN     Type of visit: FOLLOW UP     Requested date: ASAP      If rescheduling, when is the original appointment: 09/16/2024     Additional notes:PATIENT STATES THAT SHE HAS HAD INCREASED PAIN IN BOTH FEET SINCE 06/22/2024- PATIENT STATES IT HAS BEEN HARD TO WALK OR BEAR WEIGHT- OFFERRED PATIENT FIRST AVAILABLE 07/25/2024 PATIENT WOULD LIKE TO BE WORKED IN SOONER- ALSO WOULD LIKE TO SEE IF SHE CAN GET A WORK NOTE

## 2024-06-28 ENCOUNTER — OFFICE VISIT (OUTPATIENT)
Dept: PODIATRY | Facility: CLINIC | Age: 54
End: 2024-06-28
Payer: COMMERCIAL

## 2024-06-28 VITALS
BODY MASS INDEX: 32.82 KG/M2 | TEMPERATURE: 97.5 F | HEIGHT: 65 IN | OXYGEN SATURATION: 96 % | HEART RATE: 66 BPM | SYSTOLIC BLOOD PRESSURE: 128 MMHG | WEIGHT: 197 LBS | DIASTOLIC BLOOD PRESSURE: 87 MMHG

## 2024-06-28 DIAGNOSIS — M79.2 NEURITIS: ICD-10-CM

## 2024-06-28 DIAGNOSIS — M19.172 POST-TRAUMATIC OSTEOARTHRITIS OF LEFT FOOT: ICD-10-CM

## 2024-06-28 PROCEDURE — 99213 OFFICE O/P EST LOW 20 MIN: CPT | Performed by: PODIATRIST

## 2024-06-28 RX ORDER — GABAPENTIN 100 MG/1
100 CAPSULE ORAL 3 TIMES DAILY
COMMUNITY
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
## 2024-07-08 ENCOUNTER — TELEPHONE (OUTPATIENT)
Dept: FAMILY MEDICINE CLINIC | Facility: CLINIC | Age: 54
End: 2024-07-08
Payer: COMMERCIAL

## 2024-07-08 ENCOUNTER — TELEPHONE (OUTPATIENT)
Dept: PODIATRY | Facility: CLINIC | Age: 54
End: 2024-07-08
Payer: COMMERCIAL

## 2024-07-08 NOTE — TELEPHONE ENCOUNTER
Caller: Sigrid Morgan    Relationship to patient: Self    Best call back number: [phone]    Patient is needing: PATIENT CALLED IN AND SAID SHE HAD REQUESTED A REFILL FOR DICLOFENAC AND THINKS IT IS SUPPOSED TO BE FOR 50MG 3 TIMES DAILY (NOT IN LIST) AND WAS DENIED. PATIENT WOULD LIKE A CALL BACK AS TO WHY REFILL WAS DENIED. PATIENT SAID IT IS OKAY TO LEAVE MESSAGE ON PHONE.          [facility] [id number] - [place], [place] - [address] AT [facility] [address] - [phone]  - [phone]  [phone]

## 2024-07-08 NOTE — TELEPHONE ENCOUNTER
Looking at pt chart.    On 4/22/24 appt, Diclofenac was not even on pt med list.    The pain cream was D/C because it was sent to the wrong pharmacy and was sent in again to the correct one.      On 6/28/24 appt, Diclofenac was not on pt med list either.     I explained to pt that we did not even have that pt was on Diclofenac at either appt and the only med D/C was the pain cream and the re-prescribed.      Pt is going to contact PCP again

## 2024-07-08 NOTE — TELEPHONE ENCOUNTER
Called patient, Diclofenac d/c per Dr. Frederick, DPM 4/22/24.  I recommended patient call Dr. Frederick office for clarification as ON indicates patient to continue NSAID but he d/c the only NSAID on her medication list.  Patient to contact Dr. Frederick to see if his office will review chart and send in new Rx if he is wanting her to take different NSAID.  Patient will c/b if she has further issues.

## 2024-07-09 ENCOUNTER — TELEPHONE (OUTPATIENT)
Dept: FAMILY MEDICINE CLINIC | Facility: CLINIC | Age: 54
End: 2024-07-09
Payer: COMMERCIAL

## 2024-07-09 NOTE — TELEPHONE ENCOUNTER
Spoke with patient.  Patient states Dr. Frederick had her written out of work through 7/8/24.  She is requesting letter from PCP for additional time off, at least through the end of this week due to 'I am swollen all over' due to being out of Diclofenac for 2 days.  Patient states she is unable to work until she gets prescription sent in today filled and is able to take it for a few days so the swelling will go down.

## 2024-07-09 NOTE — TELEPHONE ENCOUNTER
Patient called and stated that she call Dr. Frederick's office and they deleted that original prescription and they sent it to the [facility] that puts together compounds. Patient is requesting a call back. Patient is also wanting to know if she can get a doctor's note for work

## 2024-07-09 NOTE — TELEPHONE ENCOUNTER
Last OV 3/27/24  Next f/u 9/27/24    Patient requesting refill on Diclofenac.  Rx was d/c per Dr. Otoniel DPM on 4/22/24:    diclofenac (VOLTAREN) 50 MG EC tablet [[id number]]  DISCONTINUED    Order Details  Dose: 50 mg Route: Oral Frequency: 3 Times Daily   Dispense Quantity: 180 tablet Refills: 0          Sig: Take 1 tablet by mouth 3 (Three) Times a Day.         Start Date: 02/29/24 End Date: 04/22/24   Discontinued by: William Frederick DPM on 4/22/2024 14:23   Reason: *Therapy completed     I recommended patient contact his office to find out why Rx was cancelled and if he truly wanted her to d/c or take alternate medication.  This was his response:    Looking at pt chart.     On 4/22/24 appt, Diclofenac was not even on pt med list.     The pain cream was D/C because it was sent to the wrong pharmacy and was sent in again to the correct one.       On 6/28/24 appt, Diclofenac was not on pt med list either.      I explained to pt that we did not even have that pt was on Diclofenac at either appt and the only med D/C was the pain cream and the re-prescribed.       Pt is going to contact PCP again

## 2024-07-10 ENCOUNTER — TELEPHONE (OUTPATIENT)
Dept: FAMILY MEDICINE CLINIC | Facility: CLINIC | Age: 54
End: 2024-07-10
Payer: COMMERCIAL

## 2024-07-10 DIAGNOSIS — K76.89 HEPATIC CYST: Primary | ICD-10-CM

## 2024-07-10 DIAGNOSIS — N28.1 RENAL CYST: ICD-10-CM

## 2024-07-10 NOTE — TELEPHONE ENCOUNTER
Caller: Sigrid Morgan    Relationship: Self    Best call back number:     [phone]       What is the best time to reach you: ANYTIME, VOICEMAIL OK    Who are you requesting to speak with (clinical staff, provider,  specific staff member): CLINICAL    What was the call regarding: PATIENT STATES THAT A CYST WAS FOUND ON A MRI A WHILE AGO AND SHE WAS SUPPOSED HAVE AN ULTRASOUND BUT THE ORDER HAS .     PATIENT STATES THAT SHE NEEDS TO KNOW IF SHE STILL NEEDS THE ULTRASOUND OR IF THE CYST IS NOT SOMETHING TO WORRY ABOUT.

## 2024-07-28 ENCOUNTER — HOSPITAL ENCOUNTER (OUTPATIENT)
Dept: ULTRASOUND IMAGING | Facility: HOSPITAL | Age: 54
Discharge: HOME | End: 2024-07-28
Payer: COMMERCIAL

## 2024-07-28 ENCOUNTER — APPOINTMENT (OUTPATIENT)
Dept: ULTRASOUND IMAGING | Facility: HOSPITAL | Age: 54
End: 2024-07-28
Payer: COMMERCIAL

## 2024-07-28 DIAGNOSIS — K76.89 HEPATIC CYST: ICD-10-CM

## 2024-07-28 PROCEDURE — 76705 ECHO EXAM OF ABDOMEN: CPT

## 2024-08-23 ENCOUNTER — OFFICE VISIT (OUTPATIENT)
Dept: SLEEP MEDICINE | Facility: HOSPITAL | Age: 54
End: 2024-08-23
Payer: COMMERCIAL

## 2024-08-23 VITALS
SYSTOLIC BLOOD PRESSURE: 127 MMHG | OXYGEN SATURATION: 98 % | HEART RATE: 52 BPM | DIASTOLIC BLOOD PRESSURE: 71 MMHG | BODY MASS INDEX: 34.21 KG/M2 | HEIGHT: 64 IN | WEIGHT: 200.4 LBS

## 2024-08-23 DIAGNOSIS — E66.9 CLASS 1 OBESITY WITH SERIOUS COMORBIDITY AND BODY MASS INDEX (BMI) OF 33.0 TO 33.9 IN ADULT, UNSPECIFIED OBESITY TYPE: ICD-10-CM

## 2024-08-23 DIAGNOSIS — G47.33 OBSTRUCTIVE SLEEP APNEA, ADULT: Primary | ICD-10-CM

## 2024-08-23 DIAGNOSIS — G89.29 OTHER CHRONIC PAIN: ICD-10-CM

## 2024-08-23 DIAGNOSIS — L29.9 PRURITUS: ICD-10-CM

## 2024-08-23 PROCEDURE — 99214 OFFICE O/P EST MOD 30 MIN: CPT | Performed by: FAMILY MEDICINE

## 2024-08-23 PROCEDURE — G0463 HOSPITAL OUTPT CLINIC VISIT: HCPCS

## 2024-08-23 RX ORDER — GABAPENTIN 400 MG/1
400 CAPSULE ORAL
COMMUNITY
Start: 2024-08-16

## 2024-08-23 NOTE — PROGRESS NOTES
[address] [place] [id number]  Phone: [phone]  Fax: [phone]      SLEEP CLINIC FOLLOW UP PROGRESS NOTE.    Sigrid Morgan  [id number]   1970  54 y.o.  female      PCP: Camilla Mena APRN      Date of visit: 8/23/2024    Chief Complaint   Patient presents with    Sleep Apnea       Medications and allergies are reviewed by me and documented in the encounter.     SOCIAL (habits pertaining to sleep medicine)  History tobacco use:No  History of alcohol use: 0 per week  Caffeine use: 2 beverages/d    HPI:  This is a 54 y.o. PMHx chronic pain (on gabapentin 100 mg qam 200 mg qhs managed by pain management). Here for management of obstructive sleep apnea (KIMI 5.3/hr on 5/13/2024 HST). Patient is using positive airway pressure therapy and the symptoms of sleep apnea have improved significantly on the therapy. Normally patient goes to bed at 11 PM and wakes up at 7-9 AM .  The patient wakes up 3 time(s) during the night and has no problem going back to sleep.  Feels refreshed after waking up.     Overall patient's Impression of their PAP therapy is:  Not well  Current mask FFM Robina uncomfortable    No air pressure issues  Motivated to continue with PAP    Compliance data as reviewed by me with patient room today:  Date range 7/20/2024 - 8/18/2024  Overall use 93%  4-hour unique 67% - suboptimal   Average days used 4 hours 46 minutes  Device AirSense 11 AutoSet  Settings 8-20 cm H2O  EPR 2 full-time  95th percentile pressure is 12.7 cm H2O  Maximum pressure is 14.3 cm H2O  95th percentile leak is 35.3 LPM  AHI 0.9  (Apnea index breakdown on compliance therapy data central 0  0 minutes 0% obstructive 0.2 unknown 0.2 RERA 0.2  DME: [facility] [facility]  Mask used: Robina FFM - uncomfortable    -Chronic pain  Sees pain management   Was on 300 mg TID gabapentin was causing her to fall asleep all day   Currently 100 mg TID still sleepy with it but not as much  as before takes 100 mg qam and 200 mg qhs  "  Not on opiate therapy    -Pruritus for years  No fevers/no chills  Taking benadryl everyday OTC   Has not talked to her PCP about same       REVIEW OF SYSTEMS:   Is negative unless otherwise noted in HPI  [place] Sleepiness Scale :Total score: 13-OTC Benadryl use  Compliance is suboptimal with PAP therapy    Disclaimer History: The above history is based on this sleep physician's in room encounter with the patient. Pre encounter self administered questionnaires are taken into consideration and discussed with patient for any discordance. The above documentation by this sleep physician is the most accurate clinical information determined by in room sleep physician encounter with patient.     PHYSICAL EXAMINATION:  Vitals:    08/23/24 1500   BP: 127/71   Pulse: 52   SpO2: 98%   Weight: 90.9 kg (200 lb 6.4 oz)   Height: 163.8 cm (64.49\")    Body mass index is 33.88 kg/m².   CONSTITUTIONAL: Well appearing, in no overt pain or respiratory distress   NOSE: No septal defect  RESP SYSTEM:  No overt respiratory distress, speaks in clear sentences without dyspnea, no accessory muscle use  CARDIOVASULAR: No edema noted  NEURO: Oriented x 3, no gross focal deficits   Skin: no lesions, no petechiae, no purpura  Psych: affect appropriate, goal oriented         ASSESSMENT AND PLAN:  Obstructive sleep apnea ( G 47.33).    -Specific Changes made by me today:  I. After review of compliance data, in visit clinical correlation, and through shared decision making: will not make any changes to PAP therapy settings.  II.  Order placed for mask fitting with an AirFit F40 fullface mask  III.  Counseled patient PAP therapy compliance from overall health perspective and insurance requirement  IV. Counseled patient to follow-up with me in 3 months for therapy review.  Counseled may request sooner follow-up to sleep clinic anytime the patient feels necessary.  The symptoms of sleep apnea have improved with the device and the treatment.  " Patient's compliance with the device is suboptimal for treatment of sleep apnea.  I have independently reviewed the smart card down load and discussed with the patient the download data and encouarged the patient to continue to use the device.The residual AHI is acceptable. The device is benefiting the patient and the device is medically necessary.  Without proper control of sleep apnea and good compliance there is a increased risk for hypertension, diabetes mellitus and nonrestorative sleep with hypersomnia which can increase risk for motor vehicle accidents.  Untreated sleep apnea is also a risk factor for development of atrial fibrillation, pulmonary hypertension, insulin resistance and stroke. The patient is also instructed to get the supplies from the [facility] and and change them on a regular basis.  A prescription for supplies has been sent to the [facility].  Counseled no driving or operating heavy machinery while sleepy.  I have also discussed the good sleep hygiene habits and adequate amount of sleep needed for good health.  Obesity, with BMI is Body mass index is 33.88 kg/m².. Counseled weight loss will be beneficial for reduction in severity of sleep apnea, healthy diet/exercise to achieve same, follow up with primary care physician for serial monitoring and to further guide management.  Chronic pain, Follow up with pain management to discuss risks/benefits gabapentin a sedating medication and alternative therapies.  Pruritus, counseled of the patient to follow-up with her primary care physician for further evaluation and to discuss switching off Benadryl a first generation antihistamine which is very sedating to an alternative second-generation antihistamine which will be much less sedating such as cetirizine.  Requested my clinical notes forwarded to the patient's PCP    Follow up in 3 months. Patient's questions were answered.        EMR Dragon/Transcription disclaimer:   Much of this encounter  note is an electronic transcription/translation of spoken language to printed text. The electronic translation of spoken language may permit erroneous, or at times, nonsensical words or phrases to be inadvertently transcribed; Although I have reviewed the note for such errors, some may still exist.       NPI #: [id number]    Gustavo Rodriguez DO  Sleep Medicine  [facility]  08/23/24

## 2024-09-10 DIAGNOSIS — R92.8 ABNORMALITY OF RIGHT BREAST ON SCREENING MAMMOGRAM: Primary | ICD-10-CM

## 2024-09-16 ENCOUNTER — OFFICE VISIT (OUTPATIENT)
Dept: PODIATRY | Facility: CLINIC | Age: 54
End: 2024-09-16
Payer: COMMERCIAL

## 2024-09-16 VITALS
TEMPERATURE: 97.8 F | HEIGHT: 65 IN | DIASTOLIC BLOOD PRESSURE: 85 MMHG | WEIGHT: 201 LBS | HEART RATE: 55 BPM | SYSTOLIC BLOOD PRESSURE: 126 MMHG | OXYGEN SATURATION: 98 % | BODY MASS INDEX: 33.49 KG/M2

## 2024-09-16 DIAGNOSIS — M79.2 NEURITIS: ICD-10-CM

## 2024-09-16 DIAGNOSIS — M19.172 POST-TRAUMATIC OSTEOARTHRITIS OF LEFT FOOT: Primary | ICD-10-CM

## 2024-09-16 PROCEDURE — 99213 OFFICE O/P EST LOW 20 MIN: CPT | Performed by: PODIATRIST

## 2024-09-27 ENCOUNTER — OFFICE VISIT (OUTPATIENT)
Dept: FAMILY MEDICINE CLINIC | Facility: CLINIC | Age: 54
End: 2024-09-27
Payer: COMMERCIAL

## 2024-09-27 VITALS
HEIGHT: 65 IN | SYSTOLIC BLOOD PRESSURE: 132 MMHG | BODY MASS INDEX: 34.29 KG/M2 | WEIGHT: 205.8 LBS | OXYGEN SATURATION: 98 % | HEART RATE: 55 BPM | DIASTOLIC BLOOD PRESSURE: 85 MMHG

## 2024-09-27 DIAGNOSIS — G89.29 CHRONIC RIGHT SHOULDER PAIN: ICD-10-CM

## 2024-09-27 DIAGNOSIS — I10 PRIMARY HYPERTENSION: Primary | ICD-10-CM

## 2024-09-27 DIAGNOSIS — G89.29 OTHER CHRONIC PAIN: ICD-10-CM

## 2024-09-27 DIAGNOSIS — Z91.09 ENVIRONMENTAL ALLERGIES: ICD-10-CM

## 2024-09-27 DIAGNOSIS — M25.511 CHRONIC RIGHT SHOULDER PAIN: ICD-10-CM

## 2024-09-27 PROCEDURE — 99214 OFFICE O/P EST MOD 30 MIN: CPT

## 2024-09-27 RX ORDER — HYDROCHLOROTHIAZIDE 25 MG/1
25 TABLET ORAL DAILY
Qty: 90 TABLET | Refills: 1 | Status: SHIPPED | OUTPATIENT
Start: 2024-09-27

## 2024-09-27 RX ORDER — CETIRIZINE HYDROCHLORIDE 10 MG/1
10 TABLET ORAL DAILY
Qty: 90 TABLET | Refills: 1 | Status: SHIPPED | OUTPATIENT
Start: 2024-09-27

## 2024-10-07 ENCOUNTER — HOSPITAL ENCOUNTER (OUTPATIENT)
Dept: GENERAL RADIOLOGY | Facility: HOSPITAL | Age: 54
Discharge: HOME | End: 2024-10-07
Payer: COMMERCIAL

## 2024-10-07 DIAGNOSIS — G89.29 CHRONIC RIGHT SHOULDER PAIN: ICD-10-CM

## 2024-10-07 DIAGNOSIS — M25.511 CHRONIC RIGHT SHOULDER PAIN: ICD-10-CM

## 2024-10-07 PROCEDURE — 73030 X-RAY EXAM OF SHOULDER: CPT

## 2024-10-11 ENCOUNTER — TELEPHONE (OUTPATIENT)
Dept: FAMILY MEDICINE CLINIC | Facility: CLINIC | Age: 54
End: 2024-10-11
Payer: COMMERCIAL

## 2024-10-11 NOTE — TELEPHONE ENCOUNTER
Caller: Sigrid Morgan    Relationship: Self    Best call back number: [phone]    What is the best time to reach you: ANY     Who are you requesting to speak with (clinical staff, provider,  specific staff member): CLINICAL     What was the call regarding: PATIENT WOULD LIKE TO KNOW WHAT LABS SHE IS NEEDING TO COMPLETE AND WOULD SHE NEED TO FAST FOR LABS.

## 2024-10-16 ENCOUNTER — OFFICE VISIT (OUTPATIENT)
Dept: PODIATRY | Facility: CLINIC | Age: 54
End: 2024-10-16
Payer: COMMERCIAL

## 2024-10-16 VITALS
WEIGHT: 206 LBS | OXYGEN SATURATION: 98 % | BODY MASS INDEX: 34.32 KG/M2 | HEART RATE: 59 BPM | DIASTOLIC BLOOD PRESSURE: 73 MMHG | TEMPERATURE: 98.2 F | HEIGHT: 65 IN | SYSTOLIC BLOOD PRESSURE: 127 MMHG

## 2024-10-16 DIAGNOSIS — M25.372 ANKLE INSTABILITY, LEFT: ICD-10-CM

## 2024-10-16 DIAGNOSIS — M95.8 OSTEOCHONDRAL DEFECT: Primary | ICD-10-CM

## 2024-10-17 ENCOUNTER — OFFICE VISIT (OUTPATIENT)
Dept: ORTHOPEDIC SURGERY | Facility: CLINIC | Age: 54
End: 2024-10-17
Payer: COMMERCIAL

## 2024-10-17 VITALS
SYSTOLIC BLOOD PRESSURE: 115 MMHG | OXYGEN SATURATION: 98 % | HEART RATE: 55 BPM | BODY MASS INDEX: 34.32 KG/M2 | WEIGHT: 206 LBS | HEIGHT: 65 IN | DIASTOLIC BLOOD PRESSURE: 78 MMHG

## 2024-10-17 DIAGNOSIS — M54.12 CERVICAL RADICULOPATHY: Primary | ICD-10-CM

## 2024-10-17 DIAGNOSIS — S46.009A ROTATOR CUFF INJURY, INITIAL ENCOUNTER: ICD-10-CM

## 2024-10-17 RX ORDER — LIDOCAINE HYDROCHLORIDE 10 MG/ML
5 INJECTION, SOLUTION INFILTRATION; PERINEURAL
Status: COMPLETED | OUTPATIENT
Start: 2024-10-17 | End: 2024-10-17

## 2024-10-17 RX ORDER — TRIAMCINOLONE ACETONIDE 40 MG/ML
40 INJECTION, SUSPENSION INTRA-ARTICULAR; INTRAMUSCULAR
Status: COMPLETED | OUTPATIENT
Start: 2024-10-17 | End: 2024-10-17

## 2024-10-17 RX ADMIN — TRIAMCINOLONE ACETONIDE 40 MG: 40 INJECTION, SUSPENSION INTRA-ARTICULAR; INTRAMUSCULAR at 15:50

## 2024-10-17 RX ADMIN — LIDOCAINE HYDROCHLORIDE 5 ML: 10 INJECTION, SOLUTION INFILTRATION; PERINEURAL at 15:50
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
## 2024-10-17 NOTE — PROGRESS NOTES
"Chief Complaint  Pain and Initial Evaluation of the Right Scapula       Subjective      Sigrid Morgan presents to [facility] for an evaluation  of her right shoulder. Her right shoulder has been bothering her for several months. She has numbness and tingling to her hand. She states her shoulder has been \"achy\". She is currently in pain management for her neck. She recently went to her family doctor where she had an x-ray done on her right shoulder. She has pain at nighttime and states she has pain laying on her right shoulder. She is currently on Gabapentin from pain management with some relief. She denies any prior surgery or injury to her shoulder.     Allergies   Allergen Reactions    Iodine Rash    Latex Rash        Social History     Socioeconomic History    Marital status: Single   Tobacco Use    Smoking status: Some Days     Types: Cigars     Start date: 1/1/2013     Passive exposure: Current    Smokeless tobacco: Never   Vaping Use    Vaping status: Never Used   Substance and Sexual Activity    Alcohol use: Not Currently    Drug use: Yes     Types: Marijuana    Sexual activity: Defer        I reviewed the patient's chief complaint, history of present illness, review of systems, past medical history, surgical history, family history, social history, medications, and allergy list.     Review of Systems     Constitutional: Denies fevers, chills, weight loss  Cardiovascular: Denies chest pain, shortness of breath  Skin: Denies rashes, acute skin changes  Neurologic: Denies headache, loss of consciousness  MSK: Right shoulder pain       Vital Signs:   /78   Pulse 55   Ht 163.8 cm (64.5\")   Wt 93.4 kg (206 lb)   SpO2 98%   BMI 34.81 kg/m²            Ortho Exam    Physical Exam  General:Alert. No acute distress     Right upper extremity: tender over the anterior lateral shoulder, forward elevation  to 100 degrees, abduction to 90 degrees, external rotation  at the side is 55 " degrees, internal rotation to SI joint, external rotation  to 70 degrees, internal rotation to 60 degrees, 4 plus supraspinatus strength ,  5/5 infraspinatus and subscap, positive  impingement, positive  cross arm, neurovascularly intact     Right shoulder  Date/Time: 10/17/2024 3:50 PM  Consent given by: patient  Site marked: site marked  Timeout: Immediately prior to procedure a time out was called to verify the correct patient, procedure, equipment, support staff and site/side marked as required   Supporting Documentation  Indications: pain   Procedure Details  Location: shoulder -   Needle gauge: 21 G.  Medications administered: 5 mL lidocaine 1 %; 40 mg triamcinolone acetonide 40 MG/ML  Patient tolerance: patient tolerated the procedure well with no immediate complications      This injection documentation was Scribed for John Paul Barrientos MD by Camilla Haywood MA.  10/17/24   15:51 EDT      Imaging Results (Most Recent)       None             Result Review :       XR Shoulder 2+ View Right    Result Date: 10/7/2024  Narrative: XR SHOULDER 2+ VW RIGHT Date of Exam: 10/7/2024 3:35 PM EDT Indication: right shoulder pain, chronic with radiculopathy down arm Comparison: None available. Findings: No acute fracture or dislocation is noted. Mild degenerative changes are noted at the AC joint. Glenohumeral joint demonstrates mild joint space narrowing and is otherwise grossly unremarkable in its appearance. Limited imaging of the soft tissues and chest are grossly unremarkable in appearance     Impression: Impression: Mild degenerative changes without evidence of acute fracture or dislocation Electronically Signed: Grupo Robertson MD  10/7/2024 4:08 PM EDT  Workstation ID: [id number]            Assessment and Plan     Diagnoses and all orders for this visit:    1. Cervical radiculopathy (Primary)    2. Rotator cuff injury, initial encounter        The patient presents here today for an evaluation  of her right  shoulder.     MRI order placed today on her right shoulder to evaluate for internal derangement.     Home exercises given today and will continue current medications for pain control.     Discussed the risks and benefits of a right shoulder steroid injection today in the office. Patient expressed understanding and wishes to proceed. She tolerated the injection well and without any complications.      Educated on risk of smoking/nicotine. Discussed options for smoking cessation. and Call or return if worsening symptoms.    Follow Up     After MRI     Patient was given instructions and counseling regarding her condition or for health maintenance advice. Please see specific information pulled into the AVS if appropriate.     Scribed for John Paul Barrientos MD by Kaylie Holder.  10/17/24   15:29 EDT    I have personally performed the services described in this document as scribed by the above individual and it is both accurate and complete. John Paul Barrientos MD 10/17/24

## 2024-10-18 ENCOUNTER — LAB (OUTPATIENT)
Dept: LAB | Facility: HOSPITAL | Age: 54
End: 2024-10-18
Payer: COMMERCIAL

## 2024-10-18 ENCOUNTER — HOSPITAL ENCOUNTER (EMERGENCY)
Facility: HOSPITAL | Age: 54
Discharge: HOME | End: 2024-10-18
Attending: EMERGENCY MEDICINE
Payer: COMMERCIAL

## 2024-10-18 ENCOUNTER — TELEPHONE (OUTPATIENT)
Dept: ORTHOPEDIC SURGERY | Facility: CLINIC | Age: 54
End: 2024-10-18
Payer: COMMERCIAL

## 2024-10-18 VITALS
HEART RATE: 57 BPM | DIASTOLIC BLOOD PRESSURE: 88 MMHG | RESPIRATION RATE: 18 BRPM | HEIGHT: 65 IN | BODY MASS INDEX: 34.27 KG/M2 | SYSTOLIC BLOOD PRESSURE: 139 MMHG | TEMPERATURE: 99 F | OXYGEN SATURATION: 98 % | WEIGHT: 205.69 LBS

## 2024-10-18 DIAGNOSIS — T50.905A ADVERSE EFFECT OF DRUG, INITIAL ENCOUNTER: Primary | ICD-10-CM

## 2024-10-18 DIAGNOSIS — I10 PRIMARY HYPERTENSION: ICD-10-CM

## 2024-10-18 DIAGNOSIS — G89.29 OTHER CHRONIC PAIN: ICD-10-CM

## 2024-10-18 LAB
ALBUMIN SERPL-MCNC: 3.9 G/DL (ref 3.5–5.2)
ALBUMIN/GLOB SERPL: 1.1 G/DL
ALP SERPL-CCNC: 57 U/L (ref 39–117)
ALT SERPL W P-5'-P-CCNC: 17 U/L (ref 1–33)
ANION GAP SERPL CALCULATED.3IONS-SCNC: 10.2 MMOL/L (ref 5–15)
AST SERPL-CCNC: 18 U/L (ref 1–32)
BASOPHILS # BLD AUTO: 0.04 10*3/MM3 (ref 0–0.2)
BASOPHILS NFR BLD AUTO: 0.5 % (ref 0–1.5)
BILIRUB SERPL-MCNC: 0.3 MG/DL (ref 0–1.2)
BUN SERPL-MCNC: 19 MG/DL (ref 6–20)
BUN/CREAT SERPL: 21.1 (ref 7–25)
CALCIUM SPEC-SCNC: 9.3 MG/DL (ref 8.6–10.5)
CHLORIDE SERPL-SCNC: 103 MMOL/L (ref 98–107)
CO2 SERPL-SCNC: 22.8 MMOL/L (ref 22–29)
CREAT SERPL-MCNC: 0.9 MG/DL (ref 0.57–1)
DEPRECATED RDW RBC AUTO: 44.8 FL (ref 37–54)
EGFRCR SERPLBLD CKD-EPI 2021: 76.1 ML/MIN/1.73
EOSINOPHIL # BLD AUTO: 0.11 10*3/MM3 (ref 0–0.4)
EOSINOPHIL NFR BLD AUTO: 1.3 % (ref 0.3–6.2)
ERYTHROCYTE [DISTWIDTH] IN BLOOD BY AUTOMATED COUNT: 13.1 % (ref 12.3–15.4)
GLOBULIN UR ELPH-MCNC: 3.6 GM/DL
GLUCOSE SERPL-MCNC: 104 MG/DL (ref 65–99)
HCT VFR BLD AUTO: 36.4 % (ref 34–46.6)
HGB BLD-MCNC: 11.6 G/DL (ref 12–15.9)
IMM GRANULOCYTES # BLD AUTO: 0.03 10*3/MM3 (ref 0–0.05)
IMM GRANULOCYTES NFR BLD AUTO: 0.3 % (ref 0–0.5)
LYMPHOCYTES # BLD AUTO: 1.87 10*3/MM3 (ref 0.7–3.1)
LYMPHOCYTES NFR BLD AUTO: 21.4 % (ref 19.6–45.3)
MCH RBC QN AUTO: 29.6 PG (ref 26.6–33)
MCHC RBC AUTO-ENTMCNC: 31.9 G/DL (ref 31.5–35.7)
MCV RBC AUTO: 92.9 FL (ref 79–97)
MONOCYTES # BLD AUTO: 0.52 10*3/MM3 (ref 0.1–0.9)
MONOCYTES NFR BLD AUTO: 5.9 % (ref 5–12)
NEUTROPHILS NFR BLD AUTO: 6.18 10*3/MM3 (ref 1.7–7)
NEUTROPHILS NFR BLD AUTO: 70.6 % (ref 42.7–76)
NRBC BLD AUTO-RTO: 0 /100 WBC (ref 0–0.2)
PLATELET # BLD AUTO: 282 10*3/MM3 (ref 140–450)
PMV BLD AUTO: 11.3 FL (ref 6–12)
POTASSIUM SERPL-SCNC: 4.3 MMOL/L (ref 3.5–5.2)
PROT SERPL-MCNC: 7.5 G/DL (ref 6–8.5)
RBC # BLD AUTO: 3.92 10*6/MM3 (ref 3.77–5.28)
SODIUM SERPL-SCNC: 136 MMOL/L (ref 136–145)
WBC NRBC COR # BLD AUTO: 8.75 10*3/MM3 (ref 3.4–10.8)

## 2024-10-18 PROCEDURE — 99283 EMERGENCY DEPT VISIT LOW MDM: CPT

## 2024-10-18 PROCEDURE — 80053 COMPREHEN METABOLIC PANEL: CPT

## 2024-10-18 PROCEDURE — 36415 COLL VENOUS BLD VENIPUNCTURE: CPT

## 2024-10-18 PROCEDURE — 63710000001 DIPHENHYDRAMINE PER 50 MG: Performed by: EMERGENCY MEDICINE

## 2024-10-18 PROCEDURE — 85025 COMPLETE CBC W/AUTO DIFF WBC: CPT

## 2024-10-18 RX ORDER — EPINEPHRINE 0.3 MG/.3ML
0.3 INJECTION SUBCUTANEOUS ONCE
Qty: 1 EACH | Refills: 0 | Status: SHIPPED | OUTPATIENT
Start: 2024-10-18 | End: 2024-10-18

## 2024-10-18 RX ORDER — DIPHENHYDRAMINE HCL 25 MG
50 CAPSULE ORAL ONCE
Status: COMPLETED | OUTPATIENT
Start: 2024-10-18 | End: 2024-10-18

## 2024-10-18 RX ADMIN — DIPHENHYDRAMINE HYDROCHLORIDE 50 MG: 25 CAPSULE ORAL at 11:06

## 2024-10-18 NOTE — DISCHARGE INSTRUCTIONS
You do not appear to be having an allergic reaction today.  This seems to be more of an adverse side effect of your injection.  Return to the emergency department for difficulty breathing, wheezing, throat swelling.  Only use the EpiPen prescribed if you feel like you are going to stop breathing before you can get to the emergency department.

## 2024-10-18 NOTE — ED PROVIDER NOTES
"Time: 11:18 AM EDT  Date of encounter:  10/18/2024  Independent Historian/Clinical History and Information was obtained by:   Patient    History is limited by: N/A    Chief Complaint: Paresthesias      History of Present Illness:  Patient is a 54 y.o. year old female who presents to the emergency department for evaluation of paresthesias.  The patient states she received a triamcinolone injection with lidocaine into her right shoulder for osteoarthritis yesterday by the orthopedic surgeon.  She feels she may be reacting to this medication.  She denies difficulty breathing, wheezing, hives but states her diffuse body from the neck down feels \"cold but is hot\".  Complains of a sensation of her body being on fire.  States she has had similar reactions in the past but never this bad.      Patient Care Team  Primary Care Provider: Camilla Mena APRN    Past Medical History:     Allergies   Allergen Reactions    Iodine Rash    Latex Rash     Past Medical History:   Diagnosis Date    Acid reflux     Arthritis     Bunion     Chronic pain     Depression     Eczema     Foot pain, left     Hypertension     Toenail deformity      Past Surgical History:   Procedure Laterality Date     SECTION      HYSTERECTOMY       Family History   Family history unknown: Yes       Home Medications:  Prior to Admission medications    Medication Sig Start Date End Date Taking? Authorizing Provider   cetirizine (zyrTEC) 10 MG tablet Take 1 tablet by mouth Daily. 24   Camilla Mena APRN   diclofenac (VOLTAREN) 50 MG EC tablet Take 1 tablet by mouth 2 (Two) Times a Day. 24   Camilla Mena APRN   gabapentin (NEURONTIN) 100 MG capsule Take 1 capsule by mouth 3 (Three) Times a Day.    Provider, MD Meri   hydroCHLOROthiazide 25 MG tablet Take 1 tablet by mouth Daily. 24   Camilla Mena APRN   Ibuprofen 3 %, Gabapentin 10 %, Baclofen 2 %, lidocaine 4 %, Ketamine HCl 4 % Apply 1-2 g topically to the appropriate area " "as directed 3 (Three) to 4 (Four) times daily. 7/1/24 7/1/25  William Frederick DPM   triamcinolone (KENALOG) 0.5 % cream Apply 1 Application topically to the appropriate area as directed 3 (Three) Times a Day.    Provider, Historical, MD        Social History:   Social History     Tobacco Use    Smoking status: Some Days     Types: Cigars     Start date: 1/1/2013     Passive exposure: Current    Smokeless tobacco: Never   Vaping Use    Vaping status: Never Used   Substance Use Topics    Alcohol use: Not Currently    Drug use: Yes     Types: Marijuana         Review of Systems:  Review of Systems   Constitutional:  Negative for chills and fever.   HENT:  Negative for congestion, rhinorrhea and sore throat.    Eyes:  Negative for photophobia.   Respiratory:  Negative for apnea, cough, chest tightness and shortness of breath.    Cardiovascular:  Negative for chest pain and palpitations.   Gastrointestinal:  Negative for abdominal pain, diarrhea, nausea and vomiting.   Endocrine: Negative.    Genitourinary:  Negative for difficulty urinating and dysuria.   Musculoskeletal:  Negative for back pain, joint swelling and myalgias.   Skin:  Negative for color change and wound.   Allergic/Immunologic: Negative.    Neurological:  Positive for numbness. Negative for dizziness, seizures, facial asymmetry, weakness, light-headedness and headaches.   Psychiatric/Behavioral: Negative.     All other systems reviewed and are negative.       Physical Exam:  /88   Pulse 57   Temp 99 °F (37.2 °C) (Oral)   Resp 18   Ht 163.8 cm (64.5\")   Wt 93.3 kg (205 lb 11 oz)   SpO2 98%   BMI 34.76 kg/m²     Physical Exam  Vitals and nursing note reviewed.   Constitutional:       General: She is awake.      Appearance: Normal appearance.   HENT:      Head: Normocephalic and atraumatic.      Comments: No evidence of angioedema     Nose: Nose normal.      Mouth/Throat:      Mouth: Mucous membranes are moist.   Eyes:      Extraocular " Movements: Extraocular movements intact.      Pupils: Pupils are equal, round, and reactive to light.   Cardiovascular:      Rate and Rhythm: Normal rate and regular rhythm.      Heart sounds: Normal heart sounds.   Pulmonary:      Effort: Pulmonary effort is normal. No respiratory distress.      Breath sounds: Normal breath sounds. No wheezing, rhonchi or rales.   Abdominal:      General: Bowel sounds are normal.      Palpations: Abdomen is soft.      Tenderness: There is no abdominal tenderness. There is no guarding or rebound.      Comments: No rigidity   Musculoskeletal:         General: No tenderness. Normal range of motion.      Cervical back: Normal range of motion and neck supple.   Skin:     General: Skin is warm and dry.      Coloration: Skin is not jaundiced.      Comments: No hives/urticaria   Neurological:      General: No focal deficit present.      Mental Status: She is alert. Mental status is at baseline.   Psychiatric:         Mood and Affect: Mood normal.                  Procedures:  Procedures      Medical Decision Making:      Comorbidities that affect care:    Depression, chronic pain, hypertension, eczema    External Notes reviewed:    Previous Clinic Note: Orthopedic office visit yesterday with Dr. Barrientos.  Description: Cervical radiculopathy.      The following orders were placed and all results were independently analyzed by me:  No orders of the defined types were placed in this encounter.      Medications Given in the Emergency Department:  Medications   diphenhydrAMINE (BENADRYL) capsule 50 mg (50 mg Oral Given 10/18/24 1106)        ED Course:         Labs:    Lab Results (last 24 hours)       Procedure Component Value Units Date/Time    Comprehensive Metabolic Panel [[id number]] Collected: 10/18/24 0734    Specimen: Blood Updated: 10/18/24 0734    CBC & Differential [[id number]] Collected: 10/18/24 0734    Specimen: Blood Updated: 10/18/24 0734    Narrative:      The following orders  were created for panel order CBC & Differential.  Procedure                               Abnormality         Status                     ---------                               -----------         ------                     CBC Auto Differential[[id number]]                            In process                   Please view results for these tests on the individual orders.    CBC Auto Differential [[id number]] Collected: 10/18/24 0734    Specimen: Blood Updated: 10/18/24 0734             Imaging:    No Radiology Exams Resulted Within Past 24 Hours      Differential Diagnosis and Discussion:    Allergic Reaction: Differential diagnosis includes but is not limited to drug side effects, contact dermatitis, autoimmune conditions, infections, mast cell disorders, serum sickness, anaphylactoid reactions, angioedema, panic or anxiety attacks.        [facility]                     Patient Care Considerations:    LABS: I considered ordering labs, however patient has no systemic complaints.      Consultants/Shared Management Plan:    None    Social Determinants of Health:    Patient is independent, reliable, and has access to care.       Disposition and Care Coordination:    Discharged: The patient is suitable and stable for discharge with no need for consideration of admission.    I have explained the patient´s condition, diagnoses and treatment plan based on the information available to me at this time. I have answered questions and addressed any concerns. The patient has a good  understanding of the patient´s diagnosis, condition, and treatment plan as can be expected at this point. The vital signs have been stable. The patient´s condition is stable and appropriate for discharge from the emergency department.      The patient will pursue further outpatient evaluation with the primary care physician or other designated or consulting physician as outlined in the discharge instructions. They are agreeable to this plan of care and  follow-up instructions have been explained in detail. The patient has received these instructions in written format and has expressed an understanding of the discharge instructions. The patient is aware that any significant change in condition or worsening of symptoms should prompt an immediate return to this or the closest emergency department or call to 911.    Final diagnoses:   Adverse effect of drug, initial encounter        ED Disposition       ED Disposition   Discharge    Condition   Stable    Comment   --               This medical record created using voice recognition software.             Osorio Leigh MD  10/18/24 6533

## 2024-10-18 NOTE — TELEPHONE ENCOUNTER
PATIENT STATES SHE IS EXPERIENCING A BURNING SENSATION FROM HER C SPINE DOWN TO HER LOWER BACK. SHE STATES IT FEELS LIKE SHE HAS DONE A HARD WORK OUT AND THE MUSCLES ARE TIRED. I LET HER KNOW THAT I WOULD CHECK WITH DR. JUNG TO SEE WHAT HE ADVISES. BEFORE I COULD SPEAK WITH DR. JUNG PATIENT CALLED BACK TO LET ME KNOW TO DISREGARD, SHE WOULD GO TO THE ER.

## 2024-10-18 NOTE — Clinical Note
[facility] EMERGENCY ROOM  [address] [place] [address]  Phone: [phone]  Fax: [phone]    Sigrid Morgan was seen and treated in our emergency department on 10/18/2024.  She may return to work on 10/21/2024.         Thank you for choosing [facility].    Osorio Leigh MD

## 2024-10-19 DIAGNOSIS — I10 PRIMARY HYPERTENSION: ICD-10-CM

## 2024-10-21 ENCOUNTER — ANESTHESIA (OUTPATIENT)
Dept: PERIOP | Facility: HOSPITAL | Age: 54
End: 2024-10-21
Payer: COMMERCIAL

## 2024-10-21 ENCOUNTER — HOSPITAL ENCOUNTER (OUTPATIENT)
Facility: HOSPITAL | Age: 54
Discharge: HOME | End: 2024-10-21
Attending: EMERGENCY MEDICINE | Admitting: STUDENT IN AN ORGANIZED HEALTH CARE EDUCATION/TRAINING PROGRAM
Payer: COMMERCIAL

## 2024-10-21 ENCOUNTER — ANESTHESIA EVENT (OUTPATIENT)
Dept: PERIOP | Facility: HOSPITAL | Age: 54
End: 2024-10-21
Payer: COMMERCIAL

## 2024-10-21 ENCOUNTER — APPOINTMENT (OUTPATIENT)
Dept: CT IMAGING | Facility: HOSPITAL | Age: 54
End: 2024-10-21
Payer: COMMERCIAL

## 2024-10-21 VITALS
RESPIRATION RATE: 18 BRPM | SYSTOLIC BLOOD PRESSURE: 141 MMHG | HEIGHT: 65 IN | HEART RATE: 60 BPM | TEMPERATURE: 98.6 F | DIASTOLIC BLOOD PRESSURE: 75 MMHG | WEIGHT: 205.69 LBS | OXYGEN SATURATION: 100 % | BODY MASS INDEX: 34.27 KG/M2

## 2024-10-21 DIAGNOSIS — K35.30 ACUTE APPENDICITIS WITH LOCALIZED PERITONITIS, WITHOUT PERFORATION, ABSCESS, OR GANGRENE: ICD-10-CM

## 2024-10-21 DIAGNOSIS — R91.1 LUNG NODULE: ICD-10-CM

## 2024-10-21 DIAGNOSIS — K35.80 ACUTE APPENDICITIS, UNSPECIFIED ACUTE APPENDICITIS TYPE: Primary | ICD-10-CM

## 2024-10-21 LAB
ALBUMIN SERPL-MCNC: 4.1 G/DL (ref 3.5–5.2)
ALBUMIN/GLOB SERPL: 1.1 G/DL
ALP SERPL-CCNC: 57 U/L (ref 39–117)
ALT SERPL W P-5'-P-CCNC: 12 U/L (ref 1–33)
ANION GAP SERPL CALCULATED.3IONS-SCNC: 13.4 MMOL/L (ref 5–15)
AST SERPL-CCNC: 11 U/L (ref 1–32)
BASOPHILS # BLD AUTO: 0.06 10*3/MM3 (ref 0–0.2)
BASOPHILS NFR BLD AUTO: 0.4 % (ref 0–1.5)
BILIRUB SERPL-MCNC: 0.9 MG/DL (ref 0–1.2)
BILIRUB UR QL STRIP: NEGATIVE
BUN SERPL-MCNC: 16 MG/DL (ref 6–20)
BUN/CREAT SERPL: 16.8 (ref 7–25)
CALCIUM SPEC-SCNC: 9.1 MG/DL (ref 8.6–10.5)
CHLORIDE SERPL-SCNC: 95 MMOL/L (ref 98–107)
CLARITY UR: CLEAR
CO2 SERPL-SCNC: 23.6 MMOL/L (ref 22–29)
COLOR UR: YELLOW
CREAT SERPL-MCNC: 0.95 MG/DL (ref 0.57–1)
D-LACTATE SERPL-SCNC: 0.8 MMOL/L (ref 0.5–2)
DEPRECATED RDW RBC AUTO: 50.5 FL (ref 37–54)
EGFRCR SERPLBLD CKD-EPI 2021: 71.3 ML/MIN/1.73
EOSINOPHIL # BLD AUTO: 0.12 10*3/MM3 (ref 0–0.4)
EOSINOPHIL NFR BLD AUTO: 0.8 % (ref 0.3–6.2)
ERYTHROCYTE [DISTWIDTH] IN BLOOD BY AUTOMATED COUNT: 14.6 % (ref 12.3–15.4)
GLOBULIN UR ELPH-MCNC: 3.6 GM/DL
GLUCOSE SERPL-MCNC: 107 MG/DL (ref 65–99)
GLUCOSE UR STRIP-MCNC: NEGATIVE MG/DL
HCT VFR BLD AUTO: 38.3 % (ref 34–46.6)
HGB BLD-MCNC: 12.5 G/DL (ref 12–15.9)
HGB UR QL STRIP.AUTO: NEGATIVE
HOLD SPECIMEN: NORMAL
HOLD SPECIMEN: NORMAL
IMM GRANULOCYTES # BLD AUTO: 0.05 10*3/MM3 (ref 0–0.05)
IMM GRANULOCYTES NFR BLD AUTO: 0.3 % (ref 0–0.5)
KETONES UR QL STRIP: NEGATIVE
LEUKOCYTE ESTERASE UR QL STRIP.AUTO: NEGATIVE
LIPASE SERPL-CCNC: 19 U/L (ref 13–60)
LYMPHOCYTES # BLD AUTO: 2.44 10*3/MM3 (ref 0.7–3.1)
LYMPHOCYTES NFR BLD AUTO: 15.3 % (ref 19.6–45.3)
MCH RBC QN AUTO: 30.6 PG (ref 26.6–33)
MCHC RBC AUTO-ENTMCNC: 32.6 G/DL (ref 31.5–35.7)
MCV RBC AUTO: 93.6 FL (ref 79–97)
MONOCYTES # BLD AUTO: 0.95 10*3/MM3 (ref 0.1–0.9)
MONOCYTES NFR BLD AUTO: 6 % (ref 5–12)
NEUTROPHILS NFR BLD AUTO: 12.33 10*3/MM3 (ref 1.7–7)
NEUTROPHILS NFR BLD AUTO: 77.2 % (ref 42.7–76)
NITRITE UR QL STRIP: NEGATIVE
NRBC BLD AUTO-RTO: 0 /100 WBC (ref 0–0.2)
PH UR STRIP.AUTO: 7 [PH] (ref 5–8)
PLATELET # BLD AUTO: 275 10*3/MM3 (ref 140–450)
PMV BLD AUTO: 10.6 FL (ref 6–12)
POTASSIUM SERPL-SCNC: 4.1 MMOL/L (ref 3.5–5.2)
PROT SERPL-MCNC: 7.7 G/DL (ref 6–8.5)
PROT UR QL STRIP: NEGATIVE
RBC # BLD AUTO: 4.09 10*6/MM3 (ref 3.77–5.28)
SODIUM SERPL-SCNC: 132 MMOL/L (ref 136–145)
SP GR UR STRIP: 1.01 (ref 1–1.03)
UROBILINOGEN UR QL STRIP: NORMAL
WBC NRBC COR # BLD AUTO: 15.95 10*3/MM3 (ref 3.4–10.8)
WHOLE BLOOD HOLD COAG: NORMAL
WHOLE BLOOD HOLD SPECIMEN: NORMAL

## 2024-10-21 PROCEDURE — 25010000002 PROPOFOL 10 MG/ML EMULSION: Performed by: NURSE ANESTHETIST, CERTIFIED REGISTERED

## 2024-10-21 PROCEDURE — 85025 COMPLETE CBC W/AUTO DIFF WBC: CPT | Performed by: EMERGENCY MEDICINE

## 2024-10-21 PROCEDURE — 25010000002 ONDANSETRON PER 1 MG: Performed by: NURSE ANESTHETIST, CERTIFIED REGISTERED

## 2024-10-21 PROCEDURE — 25010000002 HYDRALAZINE PER 20 MG: Performed by: NURSE ANESTHETIST, CERTIFIED REGISTERED

## 2024-10-21 PROCEDURE — 80053 COMPREHEN METABOLIC PANEL: CPT | Performed by: EMERGENCY MEDICINE

## 2024-10-21 PROCEDURE — 25010000002 ONDANSETRON PER 1 MG

## 2024-10-21 PROCEDURE — 25010000002 LIDOCAINE 1% - EPINEPHRINE 1:100000 1 %-1:100000 SOLUTION: Performed by: STUDENT IN AN ORGANIZED HEALTH CARE EDUCATION/TRAINING PROGRAM

## 2024-10-21 PROCEDURE — 25810000003 LACTATED RINGERS PER 1000 ML: Performed by: NURSE ANESTHETIST, CERTIFIED REGISTERED

## 2024-10-21 PROCEDURE — 25010000002 FENTANYL CITRATE (PF) 50 MCG/ML SOLUTION: Performed by: NURSE ANESTHETIST, CERTIFIED REGISTERED

## 2024-10-21 PROCEDURE — 25010000002 PIPERACILLIN SOD-TAZOBACTAM PER 1 G: Performed by: NURSE PRACTITIONER

## 2024-10-21 PROCEDURE — 74176 CT ABD & PELVIS W/O CONTRAST: CPT

## 2024-10-21 PROCEDURE — 63710000001 PROMETHAZINE PER 12.5 MG: Performed by: NURSE ANESTHETIST, CERTIFIED REGISTERED

## 2024-10-21 PROCEDURE — 25010000002 HYDROMORPHONE 1 MG/ML SOLUTION: Performed by: NURSE ANESTHETIST, CERTIFIED REGISTERED

## 2024-10-21 PROCEDURE — 83605 ASSAY OF LACTIC ACID: CPT | Performed by: EMERGENCY MEDICINE

## 2024-10-21 PROCEDURE — 81003 URINALYSIS AUTO W/O SCOPE: CPT | Performed by: EMERGENCY MEDICINE

## 2024-10-21 PROCEDURE — 36415 COLL VENOUS BLD VENIPUNCTURE: CPT

## 2024-10-21 PROCEDURE — 88304 TISSUE EXAM BY PATHOLOGIST: CPT | Performed by: STUDENT IN AN ORGANIZED HEALTH CARE EDUCATION/TRAINING PROGRAM

## 2024-10-21 PROCEDURE — 44970 LAPAROSCOPY APPENDECTOMY: CPT

## 2024-10-21 PROCEDURE — 25010000002 LIDOCAINE PF 2% 2 % SOLUTION: Performed by: NURSE ANESTHETIST, CERTIFIED REGISTERED

## 2024-10-21 PROCEDURE — 96374 THER/PROPH/DIAG INJ IV PUSH: CPT

## 2024-10-21 PROCEDURE — 25010000002 DEXAMETHASONE PER 1 MG: Performed by: NURSE ANESTHETIST, CERTIFIED REGISTERED

## 2024-10-21 PROCEDURE — 25010000002 KETOROLAC TROMETHAMINE PER 15 MG: Performed by: NURSE ANESTHETIST, CERTIFIED REGISTERED

## 2024-10-21 PROCEDURE — 25010000002 KETOROLAC TROMETHAMINE PER 15 MG

## 2024-10-21 PROCEDURE — 44970 LAPAROSCOPY APPENDECTOMY: CPT | Performed by: STUDENT IN AN ORGANIZED HEALTH CARE EDUCATION/TRAINING PROGRAM

## 2024-10-21 PROCEDURE — 83690 ASSAY OF LIPASE: CPT | Performed by: EMERGENCY MEDICINE

## 2024-10-21 PROCEDURE — 99285 EMERGENCY DEPT VISIT HI MDM: CPT

## 2024-10-21 PROCEDURE — 96375 TX/PRO/DX INJ NEW DRUG ADDON: CPT

## 2024-10-21 DEVICE — ENDOPATH ECHELON ENDOSCOPIC LINEAR CUTTER RELOADS, WHITE, 60MM
Type: IMPLANTABLE DEVICE | Site: ABDOMEN | Status: FUNCTIONAL
Brand: ECHELON ENDOPATH

## 2024-10-21 RX ORDER — LIDOCAINE HYDROCHLORIDE AND EPINEPHRINE 10; 10 MG/ML; UG/ML
INJECTION, SOLUTION INFILTRATION; PERINEURAL AS NEEDED
Status: DISCONTINUED | OUTPATIENT
Start: 2024-10-21 | End: 2024-10-21 | Stop reason: HOSPADM

## 2024-10-21 RX ORDER — PROMETHAZINE HYDROCHLORIDE 12.5 MG/1
25 TABLET ORAL ONCE AS NEEDED
Status: COMPLETED | OUTPATIENT
Start: 2024-10-21 | End: 2024-10-21

## 2024-10-21 RX ORDER — SODIUM CHLORIDE 0.9 % (FLUSH) 0.9 %
10 SYRINGE (ML) INJECTION AS NEEDED
Status: DISCONTINUED | OUTPATIENT
Start: 2024-10-21 | End: 2024-10-22 | Stop reason: HOSPADM

## 2024-10-21 RX ORDER — KETOROLAC TROMETHAMINE 15 MG/ML
15 INJECTION, SOLUTION INTRAMUSCULAR; INTRAVENOUS ONCE
Status: COMPLETED | OUTPATIENT
Start: 2024-10-21 | End: 2024-10-21

## 2024-10-21 RX ORDER — ONDANSETRON 2 MG/ML
4 INJECTION INTRAMUSCULAR; INTRAVENOUS ONCE
Status: COMPLETED | OUTPATIENT
Start: 2024-10-21 | End: 2024-10-21

## 2024-10-21 RX ORDER — PROMETHAZINE HYDROCHLORIDE 25 MG/1
25 SUPPOSITORY RECTAL ONCE AS NEEDED
Status: COMPLETED | OUTPATIENT
Start: 2024-10-21 | End: 2024-10-21

## 2024-10-21 RX ORDER — IBUPROFEN 600 MG/1
600 TABLET, FILM COATED ORAL EVERY 6 HOURS PRN
Qty: 30 TABLET | Refills: 0 | Status: SHIPPED | OUTPATIENT
Start: 2024-10-21

## 2024-10-21 RX ORDER — ONDANSETRON 2 MG/ML
INJECTION INTRAMUSCULAR; INTRAVENOUS AS NEEDED
Status: DISCONTINUED | OUTPATIENT
Start: 2024-10-21 | End: 2024-10-21 | Stop reason: SURG

## 2024-10-21 RX ORDER — MEPERIDINE HYDROCHLORIDE 25 MG/ML
12.5 INJECTION INTRAMUSCULAR; INTRAVENOUS; SUBCUTANEOUS
Status: DISCONTINUED | OUTPATIENT
Start: 2024-10-21 | End: 2024-10-21 | Stop reason: HOSPADM

## 2024-10-21 RX ORDER — OXYCODONE HYDROCHLORIDE 5 MG/1
5 TABLET ORAL EVERY 6 HOURS PRN
Qty: 15 TABLET | Refills: 0 | Status: SHIPPED | OUTPATIENT
Start: 2024-10-21

## 2024-10-21 RX ORDER — SODIUM CHLORIDE, SODIUM LACTATE, POTASSIUM CHLORIDE, CALCIUM CHLORIDE 600; 310; 30; 20 MG/100ML; MG/100ML; MG/100ML; MG/100ML
INJECTION, SOLUTION INTRAVENOUS CONTINUOUS PRN
Status: DISCONTINUED | OUTPATIENT
Start: 2024-10-21 | End: 2024-10-21 | Stop reason: SURG

## 2024-10-21 RX ORDER — SENNOSIDES 8.6 MG
650 CAPSULE ORAL EVERY 8 HOURS PRN
COMMUNITY

## 2024-10-21 RX ORDER — HYDROCHLOROTHIAZIDE 25 MG/1
25 TABLET ORAL DAILY
Qty: 90 TABLET | Refills: 1 | Status: SHIPPED | OUTPATIENT
Start: 2024-10-21

## 2024-10-21 RX ORDER — KETOROLAC TROMETHAMINE 30 MG/ML
INJECTION, SOLUTION INTRAMUSCULAR; INTRAVENOUS AS NEEDED
Status: DISCONTINUED | OUTPATIENT
Start: 2024-10-21 | End: 2024-10-21 | Stop reason: SURG

## 2024-10-21 RX ORDER — ROCURONIUM BROMIDE 10 MG/ML
INJECTION, SOLUTION INTRAVENOUS AS NEEDED
Status: DISCONTINUED | OUTPATIENT
Start: 2024-10-21 | End: 2024-10-21 | Stop reason: SURG

## 2024-10-21 RX ORDER — LIDOCAINE HYDROCHLORIDE 20 MG/ML
INJECTION, SOLUTION EPIDURAL; INFILTRATION; INTRACAUDAL; PERINEURAL AS NEEDED
Status: DISCONTINUED | OUTPATIENT
Start: 2024-10-21 | End: 2024-10-21 | Stop reason: SURG

## 2024-10-21 RX ORDER — ONDANSETRON 2 MG/ML
4 INJECTION INTRAMUSCULAR; INTRAVENOUS ONCE AS NEEDED
Status: DISCONTINUED | OUTPATIENT
Start: 2024-10-21 | End: 2024-10-21 | Stop reason: HOSPADM

## 2024-10-21 RX ORDER — FENTANYL CITRATE 50 UG/ML
INJECTION, SOLUTION INTRAMUSCULAR; INTRAVENOUS AS NEEDED
Status: DISCONTINUED | OUTPATIENT
Start: 2024-10-21 | End: 2024-10-21 | Stop reason: SURG

## 2024-10-21 RX ORDER — ACETAMINOPHEN 500 MG
500 TABLET ORAL EVERY 4 HOURS PRN
Qty: 30 TABLET | Refills: 0 | Status: SHIPPED | OUTPATIENT
Start: 2024-10-21

## 2024-10-21 RX ORDER — PROPOFOL 10 MG/ML
VIAL (ML) INTRAVENOUS AS NEEDED
Status: DISCONTINUED | OUTPATIENT
Start: 2024-10-21 | End: 2024-10-21 | Stop reason: SURG

## 2024-10-21 RX ORDER — HYDRALAZINE HYDROCHLORIDE 20 MG/ML
INJECTION INTRAMUSCULAR; INTRAVENOUS AS NEEDED
Status: DISCONTINUED | OUTPATIENT
Start: 2024-10-21 | End: 2024-10-21 | Stop reason: SURG

## 2024-10-21 RX ORDER — ONDANSETRON 4 MG/1
4 TABLET, ORALLY DISINTEGRATING ORAL ONCE AS NEEDED
Status: DISCONTINUED | OUTPATIENT
Start: 2024-10-21 | End: 2024-10-22 | Stop reason: HOSPADM

## 2024-10-21 RX ORDER — OXYCODONE HYDROCHLORIDE 5 MG/1
5 TABLET ORAL
Status: DISCONTINUED | OUTPATIENT
Start: 2024-10-21 | End: 2024-10-21 | Stop reason: HOSPADM

## 2024-10-21 RX ORDER — DEXAMETHASONE SODIUM PHOSPHATE 4 MG/ML
INJECTION, SOLUTION INTRA-ARTICULAR; INTRALESIONAL; INTRAMUSCULAR; INTRAVENOUS; SOFT TISSUE AS NEEDED
Status: DISCONTINUED | OUTPATIENT
Start: 2024-10-21 | End: 2024-10-21 | Stop reason: SURG

## 2024-10-21 RX ADMIN — HYDRALAZINE HYDROCHLORIDE 5 MG: 20 INJECTION, SOLUTION INTRAMUSCULAR; INTRAVENOUS at 18:47

## 2024-10-21 RX ADMIN — FENTANYL CITRATE 50 MCG: 50 INJECTION, SOLUTION INTRAMUSCULAR; INTRAVENOUS at 18:25

## 2024-10-21 RX ADMIN — FENTANYL CITRATE 100 MCG: 50 INJECTION, SOLUTION INTRAMUSCULAR; INTRAVENOUS at 18:05

## 2024-10-21 RX ADMIN — ONDANSETRON 4 MG: 2 INJECTION INTRAMUSCULAR; INTRAVENOUS at 13:09

## 2024-10-21 RX ADMIN — KETOROLAC TROMETHAMINE 15 MG: 15 INJECTION, SOLUTION INTRAMUSCULAR; INTRAVENOUS at 13:08

## 2024-10-21 RX ADMIN — ROCURONIUM BROMIDE 50 MG: 10 INJECTION, SOLUTION INTRAVENOUS at 18:05

## 2024-10-21 RX ADMIN — KETOROLAC TROMETHAMINE 30 MG: 30 INJECTION, SOLUTION INTRAMUSCULAR; INTRAVENOUS at 18:35

## 2024-10-21 RX ADMIN — ONDANSETRON HYDROCHLORIDE 4 MG: 2 SOLUTION INTRAMUSCULAR; INTRAVENOUS at 18:05

## 2024-10-21 RX ADMIN — SODIUM CHLORIDE, POTASSIUM CHLORIDE, SODIUM LACTATE AND CALCIUM CHLORIDE: 600; 310; 30; 20 INJECTION, SOLUTION INTRAVENOUS at 18:03

## 2024-10-21 RX ADMIN — FENTANYL CITRATE 50 MCG: 50 INJECTION, SOLUTION INTRAMUSCULAR; INTRAVENOUS at 18:31

## 2024-10-21 RX ADMIN — DEXAMETHASONE SODIUM PHOSPHATE 8 MG: 4 INJECTION, SOLUTION INTRAMUSCULAR; INTRAVENOUS at 18:05

## 2024-10-21 RX ADMIN — HYDROMORPHONE HYDROCHLORIDE 0.5 MG: 1 INJECTION, SOLUTION INTRAMUSCULAR; INTRAVENOUS; SUBCUTANEOUS at 19:04

## 2024-10-21 RX ADMIN — PROPOFOL 170 MG: 10 INJECTION, EMULSION INTRAVENOUS at 18:05

## 2024-10-21 RX ADMIN — PIPERACILLIN AND TAZOBACTAM 3.38 G: 3; .375 INJECTION, POWDER, FOR SOLUTION INTRAVENOUS at 15:59

## 2024-10-21 RX ADMIN — ONDANSETRON 4 MG: 2 INJECTION INTRAMUSCULAR; INTRAVENOUS at 19:32

## 2024-10-21 RX ADMIN — LIDOCAINE HYDROCHLORIDE 40 MG: 20 INJECTION, SOLUTION INTRAVENOUS at 18:05

## 2024-10-21 RX ADMIN — HYDRALAZINE HYDROCHLORIDE 5 MG: 20 INJECTION, SOLUTION INTRAMUSCULAR; INTRAVENOUS at 18:51

## 2024-10-21 RX ADMIN — PROMETHAZINE HYDROCHLORIDE 25 MG: 12.5 TABLET ORAL at 19:38

## 2024-10-21 NOTE — H&P
History and Physical    Patient Name:  Sigrid Morgan  YOB: 1970  [id number]    Referring Provider: William PIERRE    Patient Care Team:  Camilla Mena APRN as PCP - General (Nurse Practitioner)    Reason for consult/Chief complaint: abdominal pain     Subjective .     History of present illness:   Sigrid Morgan is a 54 y.o. female who presents to the ED at [facility] for lower periumbilical pain that started yesterday.  She has had nausea.  Denies fever, chills, constipation, or diarrhea.  She had a CT scan of the abdomen and pelvis that indicates she has an acute appendicitis.  Her WBC count is 15.      History:  Past Medical History:   Diagnosis Date    Acid reflux     Acute appendicitis with localized peritonitis, without perforation, abscess, or gangrene 10/21/2024    Arthritis     Bunion     Chronic pain     Depression     Eczema     Foot pain, left     Hypertension     Toenail deformity        Past Surgical History:   Procedure Laterality Date     SECTION      HYSTERECTOMY         Family History   Family history unknown: Yes       Social History     Tobacco Use    Smoking status: Some Days     Types: Cigars     Start date: 2013     Passive exposure: Current    Smokeless tobacco: Never   Vaping Use    Vaping status: Never Used   Substance Use Topics    Alcohol use: Not Currently    Drug use: Yes     Types: Marijuana       Review of Systems:  A complete ROS was performed and all are negative except for what is documented in the HPI.    MEDS:  Prior to Admission medications    Medication Sig Start Date End Date Taking? Authorizing Provider   cetirizine (zyrTEC) 10 MG tablet Take 1 tablet by mouth Daily. 24   Camilla Mena APRN   diclofenac (VOLTAREN) 50 MG EC tablet Take 1 tablet by mouth 2 (Two) Times a Day. 24   Camilla Mena APRN   gabapentin (NEURONTIN) 100 MG capsule Take 1 capsule by mouth 3 (Three) Times a Day.    Provider, MD Meri   hydroCHLOROthiazide 25 MG  "tablet Take 1 tablet by mouth Daily. 9/27/24   Camilla Mena APRARNOL   Ibuprofen 3 %, Gabapentin 10 %, Baclofen 2 %, lidocaine 4 %, Ketamine HCl 4 % Apply 1-2 g topically to the appropriate area as directed 3 (Three) to 4 (Four) times daily. 7/1/24 7/1/25  William Frederick DPM   triamcinolone (KENALOG) 0.5 % cream Apply 1 Application topically to the appropriate area as directed 3 (Three) Times a Day.    Provider, Meri, MD                    Allergies:  Iodine and Latex    Objective         Physical Exam:      Constitutional:  well nourished, no acute distress, appears stated age /76 (BP Location: Left arm, Patient Position: Sitting)   Pulse 60   Temp 98.9 °F (37.2 °C) (Oral)   Resp 17   Ht 163.8 cm (64.5\")   Wt 93.3 kg (205 lb 11 oz)   SpO2 99%   BMI 34.76 kg/m²    Eyes:  anicteric sclerae, moist conjunctivae, no lid lag, PERRLA  ENMT:  oropharynx clear, moist mucous membranes, good dentition  Neck:   full ROM, trachea midline,  Cardiovascular: RRR, S1 and S2 present, no MRG, heart rate 60, no pedal edema  Respiratory: lungs CTA, respirations even and unlabored  GI:  Abdomen soft, nontender, nondistended, no HSM     Skin:  warm and dry, normal turgor, no rashes  Psychiatric:  alert and oriented x3, intact judgment and insight, cooperative         Results Review: I have reviewed the patient's labs and imaging including CBC, CMP, CT of abd and pelvis    LABS/IMAGING:    WBC   Date Value Ref Range Status   10/21/2024 15.95 (H) 3.40 - 10.80 10*3/mm3 Final     RBC   Date Value Ref Range Status   10/21/2024 4.09 3.77 - 5.28 10*6/mm3 Final     Hemoglobin   Date Value Ref Range Status   10/21/2024 12.5 12.0 - 15.9 g/dL Final     Hematocrit   Date Value Ref Range Status   10/21/2024 38.3 34.0 - 46.6 % Final     MCV   Date Value Ref Range Status   10/21/2024 93.6 79.0 - 97.0 fL Final     MCH   Date Value Ref Range Status   10/21/2024 30.6 26.6 - 33.0 pg Final     [facility]   Date Value Ref Range Status " "  10/21/2024 32.6 31.5 - 35.7 g/dL Final     RDW   Date Value Ref Range Status   10/21/2024 14.6 12.3 - 15.4 % Final     RDW-SD   Date Value Ref Range Status   10/21/2024 50.5 37.0 - 54.0 fl Final     MPV   Date Value Ref Range Status   10/21/2024 10.6 6.0 - 12.0 fL Final     Platelets   Date Value Ref Range Status   10/21/2024 275 140 - 450 10*3/mm3 Final     Neutrophil %   Date Value Ref Range Status   10/21/2024 77.2 (H) 42.7 - 76.0 % Final     Lymphocyte %   Date Value Ref Range Status   10/21/2024 15.3 (L) 19.6 - 45.3 % Final     Monocyte %   Date Value Ref Range Status   10/21/2024 6.0 5.0 - 12.0 % Final     Eosinophil %   Date Value Ref Range Status   10/21/2024 0.8 0.3 - 6.2 % Final     Basophil %   Date Value Ref Range Status   10/21/2024 0.4 0.0 - 1.5 % Final     Immature Grans %   Date Value Ref Range Status   10/21/2024 0.3 0.0 - 0.5 % Final     Neutrophils, Absolute   Date Value Ref Range Status   10/21/2024 12.33 (H) 1.70 - 7.00 10*3/mm3 Final     Lymphocytes, Absolute   Date Value Ref Range Status   10/21/2024 2.44 0.70 - 3.10 10*3/mm3 Final     Monocytes, Absolute   Date Value Ref Range Status   10/21/2024 0.95 (H) 0.10 - 0.90 10*3/mm3 Final     Eosinophils, Absolute   Date Value Ref Range Status   10/21/2024 0.12 0.00 - 0.40 10*3/mm3 Final     Basophils, Absolute   Date Value Ref Range Status   10/21/2024 0.06 0.00 - 0.20 10*3/mm3 Final     Immature Grans, Absolute   Date Value Ref Range Status   10/21/2024 0.05 0.00 - 0.05 10*3/mm3 Final     nRBC   Date Value Ref Range Status   10/21/2024 0.0 0.0 - 0.2 /100 WBC Final        Basic Metabolic Panel    Sodium Sodium   Date Value Ref Range Status   10/21/2024 132 (L) 136 - 145 mmol/L Final      Potassium Potassium   Date Value Ref Range Status   10/21/2024 4.1 3.5 - 5.2 mmol/L Final      Chloride Chloride   Date Value Ref Range Status   10/21/2024 95 (L) 98 - 107 mmol/L Final      Bicarbonate No results found for: \"PLASMABICARB\"   BUN BUN   Date Value " "Ref Range Status   10/21/2024 16 6 - 20 mg/dL Final      Creatinine Creatinine   Date Value Ref Range Status   10/21/2024 0.95 0.57 - 1.00 mg/dL Final      Calcium Calcium   Date Value Ref Range Status   10/21/2024 9.1 8.6 - 10.5 mg/dL Final      Glucose      No components found for: \"GLUCOSE.*\"        Lab Results   Component Value Date    GLUCOSE 107 (H) 10/21/2024    BUN 16 10/21/2024    CREATININE 0.95 10/21/2024    BCR 16.8 10/21/2024    K 4.1 10/21/2024    CO2 23.6 10/21/2024    CALCIUM 9.1 10/21/2024    ALBUMIN 4.1 10/21/2024    AST 11 10/21/2024    ALT 12 10/21/2024          CT Abdomen Pelvis Without Contrast    Result Date: 10/21/2024  CT ABDOMEN PELVIS WO CONTRAST Date of Exam: 10/21/2024 1:14 PM EDT Indication: Flank pain, kidney stone suspected periumbilical abd pain with nausea flank pain. Comparison: None available. Technique: Axial CT images were obtained of the abdomen and pelvis without the administration of contrast. Reconstructed coronal and sagittal images were also obtained. Automated exposure control and iterative construction methods were used. Findings: Lower Chest: 5 mm peripheral nodule in the left lower lobe Organs: No urinary calculi or hydronephrosis. 3.5 cm right renal cyst. Left kidney, spleen, pancreas, adrenal glands have an unremarkable noncontrast appearance. Small hepatic cysts. Gallbladder unremarkable Gastrointestinal: Cecum is positioned low in the midline pelvis. The appendix is dilated up to 1.1 cm and fluid-filled, with mild periappendiceal inflammatory stranding. The appendix extends superiorly to the right of the cecum, along the right posterolateral pelvic wall superficial to the internal iliac vessels. Pelvis: Uterus surgically absent. Urinary bladder unremarkable Peritoneum/Retroperitoneum: No ascites or pneumoperitoneum. Normal caliber aorta Bones/Soft Tissues: Supraumbilical abdominal wall hernia containing omental fat and a small amount of fluid. No acute bony " abnormality. Facet arthropathy in the lower lumbar spine     Impression: 1. Uncomplicated acute appendicitis. Note pelvic location of the cecum 2. 5 mm left lower lobe nodule. If the patient is high risk, then follow-up chest CT in 12 months could be considered Electronically Signed: Leroy Lemon  10/21/2024 1:47 PM EDT  Workstation ID: [id number]          Assessment & Plan         Acute appendicits   Npo   Case request and consent for APPENDECTOMY LAPAROSCOPIC POSSIBLE OPEN plain language:  removal of appendix by Dr. Scott risks, benefits, alternatives discussed            Electronically signed by DENNIS Piña, 10/21/24, 2:27 PM EDT.

## 2024-10-21 NOTE — OP NOTE
APPENDECTOMY LAPAROSCOPIC  Procedure Report    Patient Name:  Sigrid Morgan  YOB: 1970    Date of Surgery:  10/21/2024     Indications:  Acute appendicitis    Pre-op Diagnosis:   Acute appendicitis with localized peritonitis, without perforation, abscess, or gangrene [K35.30]       Post-Op Diagnosis Codes:     * Acute appendicitis with localized peritonitis, without perforation, abscess, or gangrene [K35.30]    Procedure/CPT® Codes:      Procedure(s):  Laparoscopic appendectomy        Staff:  Surgeon(s):  Yair Scott MD    Assistant: Ade Batista RN CSA    Anesthesia: General    Estimated Blood Loss: minimal    Implants:    Implant Name Type Inv. Item Serial No.  Lot No. LRB No. Used Action   RELOAD STPLR ECHELON FLEX GST STND 60 WHT - [id number] Implant RELOAD STPLR ECHELON FLEX GST STND 60 WHT  [employer]  DIV OF J AND J [id number] N/A 1 Implanted       Specimen:          Specimens       ID Source Type Tests Collected By Collected At Frozen?    A Large Intestine, Appendix Tissue TISSUE PATHOLOGY EXAM   Yair Scott MD 10/21/24 0582                 Findings: Acute, non-perforated appendicitis    Complications: None apparent at the time of surgery    Description of Procedure: Informed consent was obtained before the patient was brought to the operating suite and placed in the supine position.  General endotracheal anesthesia was induced and administered throughout the procedure.  The patient's abdomen was prepped and draped in standard sterile fashion before a timeout procedure was performed, verifying the correct patient, procedure, and other pertinent information.    Using a #15 blade scalpel, an incision was made at the umbilicus and the abdomen was entered using the Gita technique.  12 mm balloon trocar was inserted in the abdomen and the gas applied to achieve adequate pneumoperitoneum of 15 mmHg.  Laparoscope was inserted and confirmed safe entrance into the  abdomen.  Under direct visualization 2 additional 5 mm trocars were placed: one in the suprapubic region and another in the left lower quadrant.  Attention was turned to the right lower quadrant where the visibly inflamed and nonperforated appendix was easily identified.  Inflammatory attachments to the lateral abdominal wall were serially taken down using the Harmonic scalpel to help expose the base.  Mesoappendix around the base of the appendix was taken down in a similar fashion.  Cecum appeared healthy and noninflamed, so decision was made for simple appendectomy.  60 mm Endo CAT laparoscopic stapler was used to transect the appendix at its base before the specimen was collected in an Endo Catch bag.  Staple line was inspected and appeared healthy.  Hemostasis was verified.  Pneumoperitoneum was released and the trocars removed under direct visualization.  An Endo Catch bag containing the specimen was also removed from the abdomen.  Fascia at the umbilicus was closed with a 0 PDS in figure-of-eight fashion.  Skin at all sites was closed with subcuticular 4-0 Monocryl.  Incisions were cleaned and dried before application of Exofin over top to conclude the procedure.    Patient tolerated the procedure well and there were no immediate complications.  All counts were correct x2 at the end of the procedure.    Disposition: Stable in PACU - plan for discharge home after recovery from anesthesia          The surgical first assist listed above assisted with all needed aspects of the procedure.    Electronically signed by Yair Scott MD, 10/21/24, 6:55 PM EDT.

## 2024-10-21 NOTE — ANESTHESIA PREPROCEDURE EVALUATION
Anesthesia Evaluation     Patient summary reviewed and Nursing notes reviewed   no history of anesthetic complications:   NPO Solid Status: > 8 hours  NPO Liquid Status: > 2 hours           Airway   Mallampati: II  TM distance: >3 FB  Neck ROM: full  No difficulty expected  Dental      Pulmonary - negative pulmonary ROS and normal exam    breath sounds clear to auscultation  Cardiovascular - normal exam  Exercise tolerance: good (4-7 METS)    Rhythm: regular  Rate: normal    (+) hypertension      Neuro/Psych- negative ROS  GI/Hepatic/Renal/Endo    (+) GERD well controlled    Musculoskeletal     Abdominal    Substance History - negative use     OB/GYN negative ob/gyn ROS         Other   arthritis,     ROS/Med Hx Other: PAT Nursing Notes unavailable.                     Anesthesia Plan    ASA 2     general     (Patient understands anesthesia not responsible for dental damage.    10/21/24 12:09  Sodium: 132 (L)  Potassium: 4.1  Chloride: 95 (L)  CO2: 23.6  Anion Gap: 13.4  BUN: 16  Creatinine: 0.95  BUN/Creatinine Ratio: 16.8  eGFR: 71.3  Glucose: 107 (H)  Calcium: 9.1  Alkaline Phosphatase: 57  Total Protein: 7.7  Albumin: 4.1  Globulin: 3.6  A/G Ratio: 1.1  AST (SGOT): 11    10/21/24 12:09  WBC: 15.95 (H)  RBC: 4.09  Hemoglobin: 12.5  Hematocrit: 38.3  Platelets: 275      (H): Data is abnormally high  (L): Data is abnormally low  (H): Data is abnormally high)  intravenous induction     Anesthetic plan, risks, benefits, and alternatives have been provided, discussed and informed consent has been obtained with: patient.  Pre-procedure education provided  Plan discussed with CRNA.        CODE STATUS:

## 2024-10-21 NOTE — ED PROVIDER NOTES
Time: 2:25 PM EDT  Date of encounter:  10/21/2024  Independent Historian/Clinical History and Information was obtained by:   Patient    History is limited by: N/A    Chief Complaint: abdominal pain       History of Present Illness:  Patient is a 54 y.o. year old female who presents to the emergency department for evaluation of periumbilical abdominal pain that began yesterday.  Patient admits to nausea but denies vomiting.  Patient denies fever.  Patient denies urinary symptoms.  Patient states she still has her appendix.      Patient Care Team  Primary Care Provider: Camilla Mena APRN    Past Medical History:     Allergies   Allergen Reactions    Iodine Rash    Latex Rash     Past Medical History:   Diagnosis Date    Acid reflux     Acute appendicitis with localized peritonitis, without perforation, abscess, or gangrene 10/21/2024    Arthritis     Bunion     Chronic pain     Depression     Eczema     Foot pain, left     Hypertension     Toenail deformity      Past Surgical History:   Procedure Laterality Date     SECTION      HYSTERECTOMY       Family History   Family history unknown: Yes       Home Medications:  Prior to Admission medications    Medication Sig Start Date End Date Taking? Authorizing Provider   cetirizine (zyrTEC) 10 MG tablet Take 1 tablet by mouth Daily. 24   Camilla Mena APRN   diclofenac (VOLTAREN) 50 MG EC tablet Take 1 tablet by mouth 2 (Two) Times a Day. 24   Camilla Mena APRN   gabapentin (NEURONTIN) 100 MG capsule Take 1 capsule by mouth 3 (Three) Times a Day.    Provider, MD Meri   hydroCHLOROthiazide 25 MG tablet Take 1 tablet by mouth Daily. 24   Camilla Mnea APRN   Ibuprofen 3 %, Gabapentin 10 %, Baclofen 2 %, lidocaine 4 %, Ketamine HCl 4 % Apply 1-2 g topically to the appropriate area as directed 3 (Three) to 4 (Four) times daily. 24  William Frederick, LETTY   triamcinolone (KENALOG) 0.5 % cream Apply 1 Application topically to the  "appropriate area as directed 3 (Three) Times a Day.    Provider, Meri, MD        Social History:   Social History     Tobacco Use    Smoking status: Some Days     Types: Cigars     Start date: 1/1/2013     Passive exposure: Current    Smokeless tobacco: Never   Vaping Use    Vaping status: Never Used   Substance Use Topics    Alcohol use: Not Currently    Drug use: Yes     Types: Marijuana         Review of Systems:  Review of Systems   Constitutional:  Negative for chills and fever.   HENT:  Negative for congestion, rhinorrhea and sore throat.    Eyes:  Negative for pain and visual disturbance.   Respiratory:  Negative for apnea, cough, chest tightness and shortness of breath.    Cardiovascular:  Negative for chest pain and palpitations.   Gastrointestinal:  Positive for abdominal pain and nausea. Negative for diarrhea and vomiting.   Genitourinary:  Negative for difficulty urinating and dysuria.   Musculoskeletal:  Negative for joint swelling and myalgias.   Skin:  Negative for color change.   Neurological:  Negative for seizures and headaches.   Psychiatric/Behavioral: Negative.     All other systems reviewed and are negative.       Physical Exam:  /76 (BP Location: Left arm, Patient Position: Sitting)   Pulse 60   Temp 98.9 °F (37.2 °C) (Oral)   Resp 17   Ht 163.8 cm (64.5\")   Wt 93.3 kg (205 lb 11 oz)   SpO2 99%   BMI 34.76 kg/m²     Physical Exam  Vitals and nursing note reviewed.   Constitutional:       General: She is not in acute distress.     Appearance: Normal appearance. She is not toxic-appearing.   HENT:      Head: Normocephalic and atraumatic.      Jaw: There is normal jaw occlusion.   Eyes:      General: Lids are normal.      Extraocular Movements: Extraocular movements intact.      Conjunctiva/sclera: Conjunctivae normal.      Pupils: Pupils are equal, round, and reactive to light.   Cardiovascular:      Rate and Rhythm: Normal rate and regular rhythm.      Pulses: Normal pulses. "      Heart sounds: Normal heart sounds.   Pulmonary:      Effort: Pulmonary effort is normal. No respiratory distress.      Breath sounds: Normal breath sounds. No wheezing or rhonchi.   Abdominal:      General: Abdomen is flat.      Palpations: Abdomen is soft.      Tenderness: There is abdominal tenderness in the periumbilical area. There is no guarding or rebound.   Musculoskeletal:         General: Normal range of motion.      Cervical back: Normal range of motion and neck supple.      Right lower leg: No edema.      Left lower leg: No edema.   Skin:     General: Skin is warm and dry.   Neurological:      Mental Status: She is alert and oriented to person, place, and time. Mental status is at baseline.   Psychiatric:         Mood and Affect: Mood normal.                  Procedures:  Procedures      Medical Decision Making:      Comorbidities that affect care:    Hypertension    External Notes reviewed:          The following orders were placed and all results were independently analyzed by me:  Orders Placed This Encounter   Procedures    CT Abdomen Pelvis Without Contrast    [place] Draw    Comprehensive Metabolic Panel    Lipase    Urinalysis With Microscopic If Indicated (No Culture) - Urine, Clean Catch    Lactic Acid, Plasma    CBC Auto Differential    NPO Diet NPO Type: Strict NPO    Undress & Gown    Perform Chlorhexidine Skin Prep Night Before and Morning of Procedure    IP Consult to General Surgery    Insert Peripheral IV    CBC & Differential    Green Top (Gel)    Lavender Top    Gold Top - SST    Light Blue Top       Medications Given in the Emergency Department:  Medications   sodium chloride 0.9 % flush 10 mL (has no administration in time range)   ketorolac (TORADOL) injection 15 mg (15 mg Intravenous Given 10/21/24 1308)   ondansetron (ZOFRAN) injection 4 mg (4 mg Intravenous Given 10/21/24 1309)        ED Course:         Labs:    Lab Results (last 24 hours)       Procedure Component Value Units  Date/Time    CBC & Differential [[id number]]  (Abnormal) Collected: 10/21/24 1209    Specimen: Blood from Hand, Right Updated: 10/21/24 1226    Narrative:      The following orders were created for panel order CBC & Differential.  Procedure                               Abnormality         Status                     ---------                               -----------         ------                     CBC Auto Differential[[id number]]        Abnormal            Final result                 Please view results for these tests on the individual orders.    Comprehensive Metabolic Panel [[id number]]  (Abnormal) Collected: 10/21/24 1209    Specimen: Blood from Hand, Right Updated: 10/21/24 1256     Glucose 107 mg/dL      BUN 16 mg/dL      Creatinine 0.95 mg/dL      Sodium 132 mmol/L      Potassium 4.1 mmol/L      Chloride 95 mmol/L      CO2 23.6 mmol/L      Calcium 9.1 mg/dL      Total Protein 7.7 g/dL      Albumin 4.1 g/dL      ALT (SGPT) 12 U/L      AST (SGOT) 11 U/L      Alkaline Phosphatase 57 U/L      Total Bilirubin 0.9 mg/dL      Globulin 3.6 gm/dL      A/G Ratio 1.1 g/dL      BUN/Creatinine Ratio 16.8     Anion Gap 13.4 mmol/L      eGFR 71.3 mL/min/1.73     Narrative:      GFR Normal >60  Chronic Kidney Disease <60  Kidney Failure <15      Lipase [[id number]]  (Normal) Collected: 10/21/24 1209    Specimen: Blood from Hand, Right Updated: 10/21/24 1243     Lipase 19 U/L     Lactic Acid, Plasma [[id number]]  (Normal) Collected: 10/21/24 1209    Specimen: Blood from Hand, Right Updated: 10/21/24 1240     Lactate 0.8 mmol/L     CBC Auto Differential [[id number]]  (Abnormal) Collected: 10/21/24 1209    Specimen: Blood from Hand, Right Updated: 10/21/24 1226     WBC 15.95 10*3/mm3      RBC 4.09 10*6/mm3      Hemoglobin 12.5 g/dL      Hematocrit 38.3 %      MCV 93.6 fL      MCH 30.6 pg      MCHC 32.6 g/dL      RDW 14.6 %      RDW-SD 50.5 fl      MPV 10.6 fL      Platelets 275 10*3/mm3      Neutrophil % 77.2 %      Lymphocyte  % 15.3 %      Monocyte % 6.0 %      Eosinophil % 0.8 %      Basophil % 0.4 %      Immature Grans % 0.3 %      Neutrophils, Absolute 12.33 10*3/mm3      Lymphocytes, Absolute 2.44 10*3/mm3      Monocytes, Absolute 0.95 10*3/mm3      Eosinophils, Absolute 0.12 10*3/mm3      Basophils, Absolute 0.06 10*3/mm3      Immature Grans, Absolute 0.05 10*3/mm3      nRBC 0.0 /100 WBC     Urinalysis With Microscopic If Indicated (No Culture) - Urine, Clean Catch [[id number]]  (Normal) Collected: 10/21/24 1300    Specimen: Urine, Clean Catch Updated: 10/21/24 1319     Color, UA Yellow     Appearance, UA Clear     pH, UA 7.0     Specific Gravity, UA 1.008     Glucose, UA Negative     Ketones, UA Negative     Bilirubin, UA Negative     Blood, UA Negative     Protein, UA Negative     Leuk Esterase, UA Negative     Nitrite, UA Negative     Urobilinogen, UA 0.2 E.U./dL    Narrative:      Urine microscopic not indicated.             Imaging:    CT Abdomen Pelvis Without Contrast    Result Date: 10/21/2024  CT ABDOMEN PELVIS WO CONTRAST Date of Exam: 10/21/2024 1:14 PM EDT Indication: Flank pain, kidney stone suspected periumbilical abd pain with nausea flank pain. Comparison: None available. Technique: Axial CT images were obtained of the abdomen and pelvis without the administration of contrast. Reconstructed coronal and sagittal images were also obtained. Automated exposure control and iterative construction methods were used. Findings: Lower Chest: 5 mm peripheral nodule in the left lower lobe Organs: No urinary calculi or hydronephrosis. 3.5 cm right renal cyst. Left kidney, spleen, pancreas, adrenal glands have an unremarkable noncontrast appearance. Small hepatic cysts. Gallbladder unremarkable Gastrointestinal: Cecum is positioned low in the midline pelvis. The appendix is dilated up to 1.1 cm and fluid-filled, with mild periappendiceal inflammatory stranding. The appendix extends superiorly to the right of the cecum, along the  right posterolateral pelvic wall superficial to the internal iliac vessels. Pelvis: Uterus surgically absent. Urinary bladder unremarkable Peritoneum/Retroperitoneum: No ascites or pneumoperitoneum. Normal caliber aorta Bones/Soft Tissues: Supraumbilical abdominal wall hernia containing omental fat and a small amount of fluid. No acute bony abnormality. Facet arthropathy in the lower lumbar spine     1. Uncomplicated acute appendicitis. Note pelvic location of the cecum 2. 5 mm left lower lobe nodule. If the patient is high risk, then follow-up chest CT in 12 months could be considered Electronically Signed: Leroy Lemon  10/21/2024 1:47 PM EDT  Workstation ID: [id number]       Differential Diagnosis and Discussion:    Abdominal Pain: Based on the patient's signs and symptoms, I considered abdominal aortic aneurysm, small bowel obstruction, pancreatitis, acute cholecystitis, acute appendecitis, peptic ulcer disease, gastritis, colitis, endocrine disorders, irritable bowel syndrome and other differential diagnosis an etiology of the patient's abdominal pain.    All labs were reviewed and interpreted by me.  CT scan radiology impression was interpreted by me.    MDM       CBC shows evidence of leukocytosis with white blood cell count being 15.95.  CT abdomen pelvis without contrast shows Uncomplicated acute appendicitis.  I spoke with general surgeon Dr. Scott who came down to evaluate patient states he will take patient to the OR for outpatient appendectomy.  CT also shows 5 mm left lower lobe nodule.  I instructed patient to follow-up outpatient with PCP for serial imaging.              Patient Care Considerations:          Consultants/Shared Management Plan:    I spoke with general surgeon Dr. Scott who came down to evaluate patient states he will take patient to the OR for outpatient appendectomy.    Social Determinants of Health:          Disposition and Care Coordination:    Send to OR/Endoscopy        Final  diagnoses:   Acute appendicitis, unspecified acute appendicitis type   Lung nodule        ED Disposition       ED Disposition   Send to OR    Condition   --    Comment   --               This medical record created using voice recognition software.             William Grant PA-C  10/21/24 5828

## 2024-10-21 NOTE — ANESTHESIA POSTPROCEDURE EVALUATION
Patient: Sigrid Morgan    Procedure Summary       Date: 10/21/24 Room / Location: [facility] OR 03 / [facility] MAIN OR    Anesthesia Start: 1803 Anesthesia Stop: 1857    Procedure: APPENDECTOMY LAPAROSCOPIC POSSIBLE OPEN plain language:  removal of appendix (Abdomen) Diagnosis:       Acute appendicitis with localized peritonitis, without perforation, abscess, or gangrene      (Acute appendicitis with localized peritonitis, without perforation, abscess, or gangrene [K35.30])    Surgeons: Yair Scott MD Provider: Tom Li MD    Anesthesia Type: general ASA Status: 2            Anesthesia Type: general    Vitals  Vitals Value Taken Time   /79 10/21/24 1901   Temp 36.4 °C (97.6 °F) 10/21/24 1855   Pulse 66 10/21/24 1903   Resp 18 10/21/24 1855   SpO2 99 % 10/21/24 1903   Vitals shown include unfiled device data.        Post Anesthesia Care and Evaluation    Patient location during evaluation: bedside  Patient participation: complete - patient participated  Level of consciousness: awake and alert  Pain management: adequate    Airway patency: patent  Anesthetic complications: No anesthetic complications  PONV Status: none  Cardiovascular status: acceptable  Respiratory status: acceptable  Hydration status: acceptable    Comments: An Anesthesiologist personally participated in the most demanding procedures (including induction and emergence if applicable) in the anesthesia plan, monitored the course of anesthesia administration at frequent intervals and remained physically present and available for immediate diagnosis and treatment of emergencies.

## 2024-10-21 NOTE — TELEPHONE ENCOUNTER
DICLOFENAC  LRF 07/09/2024  LOV 09/27/2024  F/U 03/27/2024    HYDROCHLOROTHIAZIDE  LRF 09/27/2024  LOV 09/27/2024  F/U 03/27/2024

## 2024-10-22 ENCOUNTER — TELEPHONE (OUTPATIENT)
Dept: FAMILY MEDICINE CLINIC | Facility: CLINIC | Age: 54
End: 2024-10-22
Payer: COMMERCIAL

## 2024-10-22 NOTE — TELEPHONE ENCOUNTER
Spoke w pt and she wanted to inform us that she had an emergency appendectomy yesterday. She wanted to know if she is supposed to be off 2 or 6 weeks. I informed her that gen roman wanted her to f/u in 2 weeks and could release her back to work then.

## 2024-10-23 LAB
CYTO UR: NORMAL
LAB AP CASE REPORT: NORMAL
LAB AP CLINICAL INFORMATION: NORMAL
PATH REPORT.FINAL DX SPEC: NORMAL
PATH REPORT.GROSS SPEC: NORMAL

## 2024-10-24 ENCOUNTER — OFFICE VISIT (OUTPATIENT)
Dept: FAMILY MEDICINE CLINIC | Facility: CLINIC | Age: 54
End: 2024-10-24
Payer: COMMERCIAL

## 2024-10-24 VITALS
HEIGHT: 64 IN | SYSTOLIC BLOOD PRESSURE: 116 MMHG | WEIGHT: 209.6 LBS | OXYGEN SATURATION: 100 % | BODY MASS INDEX: 35.78 KG/M2 | DIASTOLIC BLOOD PRESSURE: 63 MMHG | HEART RATE: 57 BPM

## 2024-10-24 DIAGNOSIS — Z91.09 ENVIRONMENTAL ALLERGIES: ICD-10-CM

## 2024-10-24 DIAGNOSIS — R91.1 LUNG NODULE: ICD-10-CM

## 2024-10-24 DIAGNOSIS — Z90.49 S/P LAPAROSCOPIC APPENDECTOMY: Primary | ICD-10-CM

## 2024-10-24 PROCEDURE — 99214 OFFICE O/P EST MOD 30 MIN: CPT

## 2024-10-24 RX ORDER — CETIRIZINE HYDROCHLORIDE 10 MG/1
10 TABLET ORAL DAILY
Qty: 90 TABLET | Refills: 1 | Status: SHIPPED | OUTPATIENT
Start: 2024-10-24

## 2024-10-24 NOTE — PROGRESS NOTES
Chief Complaint  S/P appendectomy and Lung Nodule    SUBJECTIVE  Sigrid Morgan presents to [facility]    History of Present Illness  Patient is 54-year-old female who presents today for surgery follow up. Pt states that she was told to follow up with PCP.Pt states that she is going well post surgery and her BP is 116/63. She does not have a general surgery follow up yet but states she will call to make one. She had a laparoscopic appendectomy on  by Dr. Scott. No perforation was noted, not on any home ABX. Pain is well controlled. She is passing gas and was able to have a BM yesterday.     She did have an incidental finding of a 5 mm lung nodule in left lower lobe. She does have a smoking hx. Radiology recommended repeat CT in 1 year, orders placed today. Will also set up with the lung nodule clinic.     Patient declines vaccines today. Patient understands the risks of not having.           Past Medical History:   Diagnosis Date    Acid reflux     Acute appendicitis with localized peritonitis, without perforation, abscess, or gangrene 10/21/2024    Arthritis     Bunion     Chronic pain     Depression     Eczema     Foot pain, left     Hypertension     Toenail deformity       Family History   Family history unknown: Yes      Past Surgical History:   Procedure Laterality Date    APPENDECTOMY N/A 10/21/2024    Procedure: APPENDECTOMY LAPAROSCOPIC;  Surgeon: Yair Scott MD;  Location: [facility] OR;  Service: General;  Laterality: N/A;     SECTION      HYSTERECTOMY          Current Outpatient Medications:     acetaminophen (TYLENOL) 500 MG tablet, Take 1 tablet by mouth Every 4 (Four) Hours As Needed for Mild Pain or Moderate Pain., Disp: 30 tablet, Rfl: 0    cetirizine (zyrTEC) 10 MG tablet, Take 1 tablet by mouth Daily., Disp: 90 tablet, Rfl: 1    diclofenac (VOLTAREN) 50 MG EC tablet, TAKE 1 TABLET BY MOUTH 2 TIMES A DAY, Disp: 180 tablet, Rfl: 0    gabapentin  "(NEURONTIN) 100 MG capsule, Take 1 capsule by mouth 3 (Three) Times a Day., Disp: , Rfl:     hydroCHLOROthiazide 25 MG tablet, TAKE 1 TABLET BY MOUTH DAILY, Disp: 90 tablet, Rfl: 1    ibuprofen (ADVIL,MOTRIN) 600 MG tablet, Take 1 tablet by mouth Every 6 (Six) Hours As Needed for Mild Pain or Moderate Pain., Disp: 30 tablet, Rfl: 0    Ibuprofen 3 %, Gabapentin 10 %, Baclofen 2 %, lidocaine 4 %, Ketamine HCl 4 %, Apply 1-2 g topically to the appropriate area as directed 3 (Three) to 4 (Four) times daily., Disp: 90 g, Rfl: 2    oxyCODONE (ROXICODONE) 5 MG immediate release tablet, Take 1 tablet by mouth Every 6 (Six) Hours As Needed for Severe Pain., Disp: 15 tablet, Rfl: 0    triamcinolone (KENALOG) 0.5 % cream, Apply 1 Application topically to the appropriate area as directed 3 (Three) Times a Day As Needed for Irritation or Rash., Disp: , Rfl:     acetaminophen (TYLENOL) 650 MG 8 hr tablet, Take 1 tablet by mouth Every 8 (Eight) Hours As Needed for Mild Pain or Headache. (Patient not taking: Reported on 10/24/2024), Disp: , Rfl:     OBJECTIVE  Vital Signs:   /63 (BP Location: Left arm, Patient Position: Sitting, Cuff Size: Large Adult)   Pulse 57   Ht 163.8 cm (64.49\")   Wt 95.1 kg (209 lb 9.6 oz)   SpO2 100%   BMI 35.44 kg/m²    Estimated body mass index is 35.44 kg/m² as calculated from the following:    Height as of this encounter: 163.8 cm (64.49\").    Weight as of this encounter: 95.1 kg (209 lb 9.6 oz).     Wt Readings from Last 3 Encounters:   10/24/24 95.1 kg (209 lb 9.6 oz)   10/21/24 93.3 kg (205 lb 11 oz)   10/18/24 93.3 kg (205 lb 11 oz)     BP Readings from Last 3 Encounters:   10/24/24 116/63   10/21/24 141/75   10/18/24 139/88       Physical Exam  Vitals reviewed.   Constitutional:       General: She is not in acute distress.     Appearance: She is not ill-appearing.   HENT:      Head: Normocephalic and atraumatic.   Eyes:      Conjunctiva/sclera: Conjunctivae normal.   Cardiovascular:     "  Rate and Rhythm: Normal rate and regular rhythm.      Heart sounds: Normal heart sounds.   Pulmonary:      Effort: Pulmonary effort is normal.      Breath sounds: Normal breath sounds.   Skin:            Comments: 3 lap site are WNL   Neurological:      Mental Status: She is alert and oriented to person, place, and time.   Psychiatric:         Mood and Affect: Mood normal.         Behavior: Behavior normal.         Thought Content: Thought content normal.         Judgment: Judgment normal.          Result Review    Common labs          2/29/2024    08:24 10/18/2024    07:34 10/21/2024    12:09   Common Labs   Glucose 92  104  107    BUN 23  19  16    Creatinine 1.02  0.90  0.95    Sodium 143  136  132    Potassium 4.1  4.3  4.1    Chloride 104  103  95    Calcium 8.8  9.3  9.1    Albumin 4.2  3.9  4.1    Total Bilirubin 0.5  0.3  0.9    Alkaline Phosphatase 48  57  57    AST (SGOT) 21  18  11    ALT (SGPT) 13  17  12    WBC 5.13  8.75  15.95    Hemoglobin 11.9  11.6  12.5    Hematocrit 36.2  36.4  38.3    Platelets 213  282  275    Total Cholesterol 153      Triglycerides 34      HDL Cholesterol 61      LDL Cholesterol  84          CT Abdomen Pelvis Without Contrast    Result Date: 10/21/2024  1. Uncomplicated acute appendicitis. Note pelvic location of the cecum 2. 5 mm left lower lobe nodule. If the patient is high risk, then follow-up chest CT in 12 months could be considered Electronically Signed: Leroy Lemon  10/21/2024 1:47 PM EDT  Workstation ID: [id number]    XR Shoulder 2+ View Right    Result Date: 10/7/2024  Impression: Mild degenerative changes without evidence of acute fracture or dislocation Electronically Signed: Grupo Robertson MD  10/7/2024 4:08 PM EDT  Workstation ID: [id number]    US Abdomen Limited    Result Date: 7/28/2024  1. Simple cyst right kidney. 2. Lesion seen within the liver most compatible with cyst or hemangioma not visualized on ultrasound. Any additional follow-up should be obtained  as clinically indicated. 3. Otherwise unremarkable right upper quadrant ultrasound Electronically Signed: Grupo Robertson MD  7/28/2024 9:34 PM EDT  Workstation ID: [id number]        The above data has been reviewed by DENNIS Quinonez 10/24/2024 08:25 EDT.          Patient Care Team:  Camilla Mena APRN as PCP - General (Nurse Practitioner)            ASSESSMENT & PLAN    Diagnoses and all orders for this visit:    1. S/P laparoscopic appendectomy (Primary)  Comments:  doing well, follow up with general surgery    2. Lung nodule  Comments:  CT ordered, follow up with lung nodule clinic  Orders:  -     CT Chest Without Contrast; Future  -     Ambulatory Referral to Lung Nodule Clinic - [place]    3. Environmental allergies  Comments:  refilled zyrtec  Orders:  -     cetirizine (zyrTEC) 10 MG tablet; Take 1 tablet by mouth Daily.  Dispense: 90 tablet; Refill: 1         Tobacco Use: High Risk (10/24/2024)    Patient History     Smoking Tobacco Use: Some Days     Smokeless Tobacco Use: Never     Passive Exposure: Current       Follow Up     Return to general surgery in 2 weeks.      Patient was given instructions and counseling regarding her condition or for health maintenance advice. Please see specific information pulled into the AVS if appropriate.   I have reviewed information obtained and documented by others and I have confirmed the accuracy of this documented note.    DENNIS Quinonez

## 2024-10-30 ENCOUNTER — OFFICE VISIT (OUTPATIENT)
Dept: SURGERY | Facility: CLINIC | Age: 54
End: 2024-10-30
Payer: COMMERCIAL

## 2024-10-30 ENCOUNTER — PREP FOR SURGERY (OUTPATIENT)
Dept: OTHER | Facility: HOSPITAL | Age: 54
End: 2024-10-30
Payer: COMMERCIAL

## 2024-10-30 VITALS
HEIGHT: 64 IN | DIASTOLIC BLOOD PRESSURE: 67 MMHG | BODY MASS INDEX: 35.34 KG/M2 | WEIGHT: 207 LBS | SYSTOLIC BLOOD PRESSURE: 125 MMHG | HEART RATE: 53 BPM

## 2024-10-30 DIAGNOSIS — Z12.11 SCREENING FOR MALIGNANT NEOPLASM OF COLON: Primary | ICD-10-CM

## 2024-10-30 DIAGNOSIS — Z90.49 S/P LAPAROSCOPIC APPENDECTOMY: ICD-10-CM

## 2024-10-30 RX ORDER — SODIUM CHLORIDE 0.9 % (FLUSH) 0.9 %
10 SYRINGE (ML) INJECTION AS NEEDED
OUTPATIENT
Start: 2024-10-30

## 2024-10-30 RX ORDER — SODIUM CHLORIDE 9 MG/ML
40 INJECTION, SOLUTION INTRAVENOUS AS NEEDED
OUTPATIENT
Start: 2024-11-18 | End: 2024-11-18

## 2024-10-30 RX ORDER — SODIUM CHLORIDE 0.9 % (FLUSH) 0.9 %
3 SYRINGE (ML) INJECTION EVERY 12 HOURS SCHEDULED
OUTPATIENT
Start: 2024-10-30

## 2024-10-30 RX ORDER — POLYETHYLENE GLYCOL 3350 17 G/17G
POWDER, FOR SOLUTION ORAL
Qty: 238 PACKET | Refills: 0 | Status: SHIPPED | OUTPATIENT
Start: 2024-10-30

## 2024-10-30 NOTE — PROGRESS NOTES
Chief Complaint: Colonoscopy    Subjective      Colonoscopy consultation       History of Present Illness  Sigrid Morgan is a 54 y.o. female presents to [facility] GENERAL SURGERY for colonoscopy consultation.    Patient presents today on referral from Camilla eMnaDENNIS for colonoscopy consultation.  Patient denies any abdominal pain, change in bowel habit, or rectal bleeding.  Denies any family history of colorectal cancer.  No previous colonoscopy.    Admits to IVET.  Does use a CPAP.    Denies any cardiac issues.  Denies taking a GLP-1 receptors.    Patient also underwent a laparoscopic appendectomy on 10/21/2024 performed by Dr. Yair Scott.  Patient admits to tolerating her diet well.  Having bowel movements without difficulty.  She denies any current fever or chills.    Results for orders placed or performed during the hospital encounter of 10/21/24   Comprehensive Metabolic Panel    Collection Time: 10/21/24 12:09 PM    Specimen: Hand, Right; Blood   Result Value Ref Range    Glucose 107 (H) 65 - 99 mg/dL    BUN 16 6 - 20 mg/dL    Creatinine 0.95 0.57 - 1.00 mg/dL    Sodium 132 (L) 136 - 145 mmol/L    Potassium 4.1 3.5 - 5.2 mmol/L    Chloride 95 (L) 98 - 107 mmol/L    CO2 23.6 22.0 - 29.0 mmol/L    Calcium 9.1 8.6 - 10.5 mg/dL    Total Protein 7.7 6.0 - 8.5 g/dL    Albumin 4.1 3.5 - 5.2 g/dL    ALT (SGPT) 12 1 - 33 U/L    AST (SGOT) 11 1 - 32 U/L    Alkaline Phosphatase 57 39 - 117 U/L    Total Bilirubin 0.9 0.0 - 1.2 mg/dL    Globulin 3.6 gm/dL    A/G Ratio 1.1 g/dL    BUN/Creatinine Ratio 16.8 7.0 - 25.0    Anion Gap 13.4 5.0 - 15.0 mmol/L    eGFR 71.3 >60.0 mL/min/1.73   Lipase    Collection Time: 10/21/24 12:09 PM    Specimen: Hand, Right; Blood   Result Value Ref Range    Lipase 19 13 - 60 U/L   Lactic Acid, Plasma    Collection Time: 10/21/24 12:09 PM    Specimen: Hand, Right; Blood   Result Value Ref Range    Lactate 0.8 0.5 - 2.0 mmol/L   CBC Auto Differential    Collection Time:  10/21/24 12:09 PM    Specimen: Hand, Right; Blood   Result Value Ref Range    WBC 15.95 (H) 3.40 - 10.80 10*3/mm3    RBC 4.09 3.77 - 5.28 10*6/mm3    Hemoglobin 12.5 12.0 - 15.9 g/dL    Hematocrit 38.3 34.0 - 46.6 %    MCV 93.6 79.0 - 97.0 fL    MCH 30.6 26.6 - 33.0 pg    MCHC 32.6 31.5 - 35.7 g/dL    RDW 14.6 12.3 - 15.4 %    RDW-SD 50.5 37.0 - 54.0 fl    MPV 10.6 6.0 - 12.0 fL    Platelets 275 140 - 450 10*3/mm3    Neutrophil % 77.2 (H) 42.7 - 76.0 %    Lymphocyte % 15.3 (L) 19.6 - 45.3 %    Monocyte % 6.0 5.0 - 12.0 %    Eosinophil % 0.8 0.3 - 6.2 %    Basophil % 0.4 0.0 - 1.5 %    Immature Grans % 0.3 0.0 - 0.5 %    Neutrophils, Absolute 12.33 (H) 1.70 - 7.00 10*3/mm3    Lymphocytes, Absolute 2.44 0.70 - 3.10 10*3/mm3    Monocytes, Absolute 0.95 (H) 0.10 - 0.90 10*3/mm3    Eosinophils, Absolute 0.12 0.00 - 0.40 10*3/mm3    Basophils, Absolute 0.06 0.00 - 0.20 10*3/mm3    Immature Grans, Absolute 0.05 0.00 - 0.05 10*3/mm3    nRBC 0.0 0.0 - 0.2 /100 WBC   Green Top (Gel)    Collection Time: 10/21/24 12:09 PM   Result Value Ref Range    Extra Tube Hold for add-ons.    Lavender Top    Collection Time: 10/21/24 12:09 PM   Result Value Ref Range    Extra Tube hold for add-on    Gold Top - SST    Collection Time: 10/21/24 12:09 PM   Result Value Ref Range    Extra Tube Hold for add-ons.    Light Blue Top    Collection Time: 10/21/24 12:09 PM   Result Value Ref Range    Extra Tube Hold for add-ons.    Urinalysis With Microscopic If Indicated (No Culture) - Urine, Clean Catch    Collection Time: 10/21/24  1:00 PM    Specimen: Urine, Clean Catch   Result Value Ref Range    Color, UA Yellow Yellow, Straw    Appearance, UA Clear Clear    pH, UA 7.0 5.0 - 8.0    Specific Gravity, UA 1.008 1.005 - 1.030    Glucose, UA Negative Negative    Ketones, UA Negative Negative    Bilirubin, UA Negative Negative    Blood, UA Negative Negative    Protein, UA Negative Negative    Leuk Esterase, UA Negative Negative    Nitrite, UA  "Negative Negative    Urobilinogen, UA 0.2 E.U./dL 0.2 - 1.0 E.U./dL   Tissue Pathology Exam    Collection Time: 10/21/24  6:39 PM    Specimen: Large Intestine, Appendix; Tissue   Result Value Ref Range    Case Report       Surgical Pathology Report                         Case: [id number]                                  Authorizing Provider:  Yair Scott MD        Collected:           10/21/2024 06:39 PM          Ordering Location:     [facility] MAIN Received:            10/22/2024 10:27 AM                                 OR                                                                           Pathologist:           Tom Corcoran MD                                                            Specimen:    Large Intestine, Appendix                                                                  Clinical Information       Acute appendicitis with localized peritonitis, without perforation, abscess, or gangrene      Final Diagnosis       Appendix, appendectomy:   - Acute appendicitis and periappendicitis      Gross Description       1. Large Intestine, Appendix.  Received in formalin and labeled \"appendix\" is a 5.5 cm in length by 0.8 cm in average diameter appendix with a stapled proximal margin and up to 2.5 cm of attached mesoappendix.  The dusky serosa is distally covered with gray exudate.  Sectioning reveals a 0.2 cm in diameter lumen, an average wall thickness of 0.3 cm, and a 0.2 cm possible perforation in the tip.  Representative sections are submitted in 2 cassettes, to include the proximal margin, en face, in 1A.   SARAHY      Microscopic Description       Microscopic examination performed.           Objective     Past Medical History:   Diagnosis Date    Acid reflux     Acute appendicitis with localized peritonitis, without perforation, abscess, or gangrene 10/21/2024    Arthritis     Breast mass 2024    Bunion     Chronic pain     Depression     Eczema     Foot pain, left     History of " transfusion     Hypertension     Toenail deformity        Past Surgical History:   Procedure Laterality Date    APPENDECTOMY N/A 10/21/2024    Procedure: APPENDECTOMY LAPAROSCOPIC;  Surgeon: Yair Scott MD;  Location: [facility] MAIN OR;  Service: General;  Laterality: N/A;     SECTION      HYSTERECTOMY         Outpatient Medications Marked as Taking for the 10/30/24 encounter (Office Visit) with Addy    Medication Sig Dispense Refill    acetaminophen (TYLENOL) 500 MG tablet Take 1 tablet by mouth Every 4 (Four) Hours As Needed for Mild Pain or Moderate Pain. 30 tablet 0    cetirizine (zyrTEC) 10 MG tablet Take 1 tablet by mouth Daily. 90 tablet 1    diclofenac (VOLTAREN) 50 MG EC tablet TAKE 1 TABLET BY MOUTH 2 TIMES A  tablet 0    gabapentin (NEURONTIN) 100 MG capsule Take 1 capsule by mouth 3 (Three) Times a Day.      hydroCHLOROthiazide 25 MG tablet TAKE 1 TABLET BY MOUTH DAILY 90 tablet 1    ibuprofen (ADVIL,MOTRIN) 600 MG tablet Take 1 tablet by mouth Every 6 (Six) Hours As Needed for Mild Pain or Moderate Pain. 30 tablet 0    Ibuprofen 3 %, Gabapentin 10 %, Baclofen 2 %, lidocaine 4 %, Ketamine HCl 4 % Apply 1-2 g topically to the appropriate area as directed 3 (Three) to 4 (Four) times daily. 90 g 2    oxyCODONE (ROXICODONE) 5 MG immediate release tablet Take 1 tablet by mouth Every 6 (Six) Hours As Needed for Severe Pain. 15 tablet 0    triamcinolone (KENALOG) 0.5 % cream Apply 1 Application topically to the appropriate area as directed 3 (Three) Times a Day As Needed for Irritation or Rash.         Allergies   Allergen Reactions    Iodine Rash    Latex Rash        Family History   Problem Relation Age of Onset    Arthritis Mother     Depression Mother     Hyperlipidemia Mother     Cancer Paternal Grandmother     Diabetes Brother        Social History     Socioeconomic History    Marital status: Single   Tobacco Use    Smoking status: Some Days     Types: Cigars     Start  "date: 1/1/2013     Passive exposure: Current    Smokeless tobacco: Never   Vaping Use    Vaping status: Never Used   Substance and Sexual Activity    Alcohol use: Not Currently    Drug use: Yes     Types: Marijuana    Sexual activity: Not Currently     Partners: Male     Birth control/protection: None       Review of Systems   Constitutional:  Negative for chills and fever.   Gastrointestinal:  Negative for abdominal distention, abdominal pain, anal bleeding, blood in stool, constipation, diarrhea and rectal pain.        Vital Signs:   /67 (BP Location: Left arm, Patient Position: Sitting, Cuff Size: Adult)   Pulse 53   Ht 163.8 cm (64.49\")   Wt 93.9 kg (207 lb)   BMI 34.99 kg/m²      Physical Exam  Vitals and nursing note reviewed.   Constitutional:       General: She is not in acute distress.     Appearance: Normal appearance. She is not ill-appearing.   HENT:      Head: Normocephalic and atraumatic.   Cardiovascular:      Rate and Rhythm: Normal rate.   Pulmonary:      Effort: Pulmonary effort is normal.      Breath sounds: No stridor.   Abdominal:      Palpations: Abdomen is soft.      Tenderness: There is no guarding.      Comments: Abdomen: All laparoscopic incisions are clean dry and intact without erythema.   Musculoskeletal:         General: No deformity. Normal range of motion.   Skin:     General: Skin is warm and dry.      Coloration: Skin is not jaundiced.   Neurological:      General: No focal deficit present.      Mental Status: She is alert and oriented to person, place, and time.   Psychiatric:         Mood and Affect: Mood normal.         Thought Content: Thought content normal.          Result Review :            []  Laboratory  []  Radiology  []  Pathology  []  Microbiology  []  EKG/Telemetry   []  Cardiology/Vascular   []  Old records  I spent 15 minutes caring for Sigrid on this date of service. This time includes time spent by me in the following activities: reviewing tests, obtaining " and/or reviewing a separately obtained history, performing a medically appropriate examination and/or evaluation, ordering medications, tests, or procedures, and documenting information in the medical record        Assessment and Plan    Diagnoses and all orders for this visit:    1. Screening for malignant neoplasm of colon (Primary)    2. S/P laparoscopic appendectomy    Other orders  -     polyethylene glycol (MIRALAX) 17 g packet; Take as directed.  Instructions given in office.  Dispense: 238 g bottle  Dispense: 238 packet; Refill: 0        Follow Up   Return for Schedule colonoscopy with Dr. Scott on 11/18/2024 [facility].    Hospital arrival time: 12;00    Possible risks/complications, benefits, and alternatives to surgical or invasive procedures have been explained to patient and/or legal guardian.    Patient has been evaluated and can tolerate anesthesia and/or sedation. Risks, benefits, and alternatives to anesthesia and sedation have been explained to the patient and/or legal guardian. Patient verbalizes understanding and is willing to proceed with the above plan.     Patient was given instructions and counseling regarding her condition or for health maintenance advice. Please see specific information pulled into the AVS if appropriate.     Thank you for allowing me to participate in the care of this patient. Please call with questions or concerns.

## 2024-10-30 NOTE — H&P (VIEW-ONLY)
Chief Complaint: Colonoscopy    Subjective      Colonoscopy consultation       History of Present Illness  Sigrid Morgan is a 54 y.o. female presents to [facility] GENERAL SURGERY for colonoscopy consultation.    Patient presents today on referral from Camilla MenaDENNIS for colonoscopy consultation.  Patient denies any abdominal pain, change in bowel habit, or rectal bleeding.  Denies any family history of colorectal cancer.  No previous colonoscopy.    Admits to IVET.  Does use a CPAP.    Denies any cardiac issues.  Denies taking a GLP-1 receptors.    Patient also underwent a laparoscopic appendectomy on 10/21/2024 performed by Dr. Yair Scott.  Patient admits to tolerating her diet well.  Having bowel movements without difficulty.  She denies any current fever or chills.    Results for orders placed or performed during the hospital encounter of 10/21/24   Comprehensive Metabolic Panel    Collection Time: 10/21/24 12:09 PM    Specimen: Hand, Right; Blood   Result Value Ref Range    Glucose 107 (H) 65 - 99 mg/dL    BUN 16 6 - 20 mg/dL    Creatinine 0.95 0.57 - 1.00 mg/dL    Sodium 132 (L) 136 - 145 mmol/L    Potassium 4.1 3.5 - 5.2 mmol/L    Chloride 95 (L) 98 - 107 mmol/L    CO2 23.6 22.0 - 29.0 mmol/L    Calcium 9.1 8.6 - 10.5 mg/dL    Total Protein 7.7 6.0 - 8.5 g/dL    Albumin 4.1 3.5 - 5.2 g/dL    ALT (SGPT) 12 1 - 33 U/L    AST (SGOT) 11 1 - 32 U/L    Alkaline Phosphatase 57 39 - 117 U/L    Total Bilirubin 0.9 0.0 - 1.2 mg/dL    Globulin 3.6 gm/dL    A/G Ratio 1.1 g/dL    BUN/Creatinine Ratio 16.8 7.0 - 25.0    Anion Gap 13.4 5.0 - 15.0 mmol/L    eGFR 71.3 >60.0 mL/min/1.73   Lipase    Collection Time: 10/21/24 12:09 PM    Specimen: Hand, Right; Blood   Result Value Ref Range    Lipase 19 13 - 60 U/L   Lactic Acid, Plasma    Collection Time: 10/21/24 12:09 PM    Specimen: Hand, Right; Blood   Result Value Ref Range    Lactate 0.8 0.5 - 2.0 mmol/L   CBC Auto Differential    Collection Time:  10/21/24 12:09 PM    Specimen: Hand, Right; Blood   Result Value Ref Range    WBC 15.95 (H) 3.40 - 10.80 10*3/mm3    RBC 4.09 3.77 - 5.28 10*6/mm3    Hemoglobin 12.5 12.0 - 15.9 g/dL    Hematocrit 38.3 34.0 - 46.6 %    MCV 93.6 79.0 - 97.0 fL    MCH 30.6 26.6 - 33.0 pg    MCHC 32.6 31.5 - 35.7 g/dL    RDW 14.6 12.3 - 15.4 %    RDW-SD 50.5 37.0 - 54.0 fl    MPV 10.6 6.0 - 12.0 fL    Platelets 275 140 - 450 10*3/mm3    Neutrophil % 77.2 (H) 42.7 - 76.0 %    Lymphocyte % 15.3 (L) 19.6 - 45.3 %    Monocyte % 6.0 5.0 - 12.0 %    Eosinophil % 0.8 0.3 - 6.2 %    Basophil % 0.4 0.0 - 1.5 %    Immature Grans % 0.3 0.0 - 0.5 %    Neutrophils, Absolute 12.33 (H) 1.70 - 7.00 10*3/mm3    Lymphocytes, Absolute 2.44 0.70 - 3.10 10*3/mm3    Monocytes, Absolute 0.95 (H) 0.10 - 0.90 10*3/mm3    Eosinophils, Absolute 0.12 0.00 - 0.40 10*3/mm3    Basophils, Absolute 0.06 0.00 - 0.20 10*3/mm3    Immature Grans, Absolute 0.05 0.00 - 0.05 10*3/mm3    nRBC 0.0 0.0 - 0.2 /100 WBC   Green Top (Gel)    Collection Time: 10/21/24 12:09 PM   Result Value Ref Range    Extra Tube Hold for add-ons.    Lavender Top    Collection Time: 10/21/24 12:09 PM   Result Value Ref Range    Extra Tube hold for add-on    Gold Top - SST    Collection Time: 10/21/24 12:09 PM   Result Value Ref Range    Extra Tube Hold for add-ons.    Light Blue Top    Collection Time: 10/21/24 12:09 PM   Result Value Ref Range    Extra Tube Hold for add-ons.    Urinalysis With Microscopic If Indicated (No Culture) - Urine, Clean Catch    Collection Time: 10/21/24  1:00 PM    Specimen: Urine, Clean Catch   Result Value Ref Range    Color, UA Yellow Yellow, Straw    Appearance, UA Clear Clear    pH, UA 7.0 5.0 - 8.0    Specific Gravity, UA 1.008 1.005 - 1.030    Glucose, UA Negative Negative    Ketones, UA Negative Negative    Bilirubin, UA Negative Negative    Blood, UA Negative Negative    Protein, UA Negative Negative    Leuk Esterase, UA Negative Negative    Nitrite, UA  "Negative Negative    Urobilinogen, UA 0.2 E.U./dL 0.2 - 1.0 E.U./dL   Tissue Pathology Exam    Collection Time: 10/21/24  6:39 PM    Specimen: Large Intestine, Appendix; Tissue   Result Value Ref Range    Case Report       Surgical Pathology Report                         Case: [id number]                                  Authorizing Provider:  Yair Scott MD        Collected:           10/21/2024 06:39 PM          Ordering Location:     [facility] MAIN Received:            10/22/2024 10:27 AM                                 OR                                                                           Pathologist:           Tmo Corcoran MD                                                            Specimen:    Large Intestine, Appendix                                                                  Clinical Information       Acute appendicitis with localized peritonitis, without perforation, abscess, or gangrene      Final Diagnosis       Appendix, appendectomy:   - Acute appendicitis and periappendicitis      Gross Description       1. Large Intestine, Appendix.  Received in formalin and labeled \"appendix\" is a 5.5 cm in length by 0.8 cm in average diameter appendix with a stapled proximal margin and up to 2.5 cm of attached mesoappendix.  The dusky serosa is distally covered with gray exudate.  Sectioning reveals a 0.2 cm in diameter lumen, an average wall thickness of 0.3 cm, and a 0.2 cm possible perforation in the tip.  Representative sections are submitted in 2 cassettes, to include the proximal margin, en face, in 1A.   SARAHY      Microscopic Description       Microscopic examination performed.           Objective     Past Medical History:   Diagnosis Date    Acid reflux     Acute appendicitis with localized peritonitis, without perforation, abscess, or gangrene 10/21/2024    Arthritis     Breast mass 2024    Bunion     Chronic pain     Depression     Eczema     Foot pain, left     History of " transfusion     Hypertension     Toenail deformity        Past Surgical History:   Procedure Laterality Date    APPENDECTOMY N/A 10/21/2024    Procedure: APPENDECTOMY LAPAROSCOPIC;  Surgeon: Yair Scott MD;  Location: [facility] MAIN OR;  Service: General;  Laterality: N/A;     SECTION      HYSTERECTOMY         Outpatient Medications Marked as Taking for the 10/30/24 encounter (Office Visit) with Addy    Medication Sig Dispense Refill    acetaminophen (TYLENOL) 500 MG tablet Take 1 tablet by mouth Every 4 (Four) Hours As Needed for Mild Pain or Moderate Pain. 30 tablet 0    cetirizine (zyrTEC) 10 MG tablet Take 1 tablet by mouth Daily. 90 tablet 1    diclofenac (VOLTAREN) 50 MG EC tablet TAKE 1 TABLET BY MOUTH 2 TIMES A  tablet 0    gabapentin (NEURONTIN) 100 MG capsule Take 1 capsule by mouth 3 (Three) Times a Day.      hydroCHLOROthiazide 25 MG tablet TAKE 1 TABLET BY MOUTH DAILY 90 tablet 1    ibuprofen (ADVIL,MOTRIN) 600 MG tablet Take 1 tablet by mouth Every 6 (Six) Hours As Needed for Mild Pain or Moderate Pain. 30 tablet 0    Ibuprofen 3 %, Gabapentin 10 %, Baclofen 2 %, lidocaine 4 %, Ketamine HCl 4 % Apply 1-2 g topically to the appropriate area as directed 3 (Three) to 4 (Four) times daily. 90 g 2    oxyCODONE (ROXICODONE) 5 MG immediate release tablet Take 1 tablet by mouth Every 6 (Six) Hours As Needed for Severe Pain. 15 tablet 0    triamcinolone (KENALOG) 0.5 % cream Apply 1 Application topically to the appropriate area as directed 3 (Three) Times a Day As Needed for Irritation or Rash.         Allergies   Allergen Reactions    Iodine Rash    Latex Rash        Family History   Problem Relation Age of Onset    Arthritis Mother     Depression Mother     Hyperlipidemia Mother     Cancer Paternal Grandmother     Diabetes Brother        Social History     Socioeconomic History    Marital status: Single   Tobacco Use    Smoking status: Some Days     Types: Cigars     Start  "date: 1/1/2013     Passive exposure: Current    Smokeless tobacco: Never   Vaping Use    Vaping status: Never Used   Substance and Sexual Activity    Alcohol use: Not Currently    Drug use: Yes     Types: Marijuana    Sexual activity: Not Currently     Partners: Male     Birth control/protection: None       Review of Systems   Constitutional:  Negative for chills and fever.   Gastrointestinal:  Negative for abdominal distention, abdominal pain, anal bleeding, blood in stool, constipation, diarrhea and rectal pain.        Vital Signs:   /67 (BP Location: Left arm, Patient Position: Sitting, Cuff Size: Adult)   Pulse 53   Ht 163.8 cm (64.49\")   Wt 93.9 kg (207 lb)   BMI 34.99 kg/m²      Physical Exam  Vitals and nursing note reviewed.   Constitutional:       General: She is not in acute distress.     Appearance: Normal appearance. She is not ill-appearing.   HENT:      Head: Normocephalic and atraumatic.   Cardiovascular:      Rate and Rhythm: Normal rate.   Pulmonary:      Effort: Pulmonary effort is normal.      Breath sounds: No stridor.   Abdominal:      Palpations: Abdomen is soft.      Tenderness: There is no guarding.      Comments: Abdomen: All laparoscopic incisions are clean dry and intact without erythema.   Musculoskeletal:         General: No deformity. Normal range of motion.   Skin:     General: Skin is warm and dry.      Coloration: Skin is not jaundiced.   Neurological:      General: No focal deficit present.      Mental Status: She is alert and oriented to person, place, and time.   Psychiatric:         Mood and Affect: Mood normal.         Thought Content: Thought content normal.          Result Review :            []  Laboratory  []  Radiology  []  Pathology  []  Microbiology  []  EKG/Telemetry   []  Cardiology/Vascular   []  Old records  I spent 15 minutes caring for Sigrid on this date of service. This time includes time spent by me in the following activities: reviewing tests, obtaining " and/or reviewing a separately obtained history, performing a medically appropriate examination and/or evaluation, ordering medications, tests, or procedures, and documenting information in the medical record        Assessment and Plan    Diagnoses and all orders for this visit:    1. Screening for malignant neoplasm of colon (Primary)    2. S/P laparoscopic appendectomy    Other orders  -     polyethylene glycol (MIRALAX) 17 g packet; Take as directed.  Instructions given in office.  Dispense: 238 g bottle  Dispense: 238 packet; Refill: 0        Follow Up   Return for Schedule colonoscopy with Dr. Scott on 11/18/2024 [facility].    Hospital arrival time: 12;00    Possible risks/complications, benefits, and alternatives to surgical or invasive procedures have been explained to patient and/or legal guardian.    Patient has been evaluated and can tolerate anesthesia and/or sedation. Risks, benefits, and alternatives to anesthesia and sedation have been explained to the patient and/or legal guardian. Patient verbalizes understanding and is willing to proceed with the above plan.     Patient was given instructions and counseling regarding her condition or for health maintenance advice. Please see specific information pulled into the AVS if appropriate.     Thank you for allowing me to participate in the care of this patient. Please call with questions or concerns.

## 2024-10-31 ENCOUNTER — TELEPHONE (OUTPATIENT)
Dept: ORTHOPEDIC SURGERY | Facility: CLINIC | Age: 54
End: 2024-10-31
Payer: COMMERCIAL

## 2024-10-31 ENCOUNTER — TELEPHONE (OUTPATIENT)
Dept: SURGERY | Facility: CLINIC | Age: 54
End: 2024-10-31

## 2024-10-31 NOTE — TELEPHONE ENCOUNTER
Caller: Sigrid Morgan    Relationship: Self    Best call back number: [phone]     What form or medical record are you requesting: STATEMENT    Who is requesting this form or medical record from you: EMPLOYER    How would you like to receive the form or medical records (pick-up, mail, fax):     Timeframe paperwork needed: ASAP    Additional notes: STATES PREV LETTER NEEDS TO BE CORRECTED/UPADTED

## 2024-10-31 NOTE — TELEPHONE ENCOUNTER
PATIENT NOTE PUTTING HER OFF WORK UNTIL MRI FOLLOW UP ON 11/15/24. SHE STATES SHE IS HAVING CONTINUOUS PAIN IN THE RIGHT SHOULDER AND ARM. SHE IS HAVING SHOOTING PAIN AND TINGLING DOWN THE ARM FROM THE SHOULDER. SHE WAS JUST OFF WORK THE LAST 2 WEEKS DUE TO AN EMERGENCY APPENDECTOMY AND THEY ARE RELEASING HER BACK TO WORK FROM THAT. HOWEVER SHE STATES SHE IS UNABLE TO USE THE RIGHT ARM AND WOULD RATHER BE KEPT OFF WORK OR HAVE A NOTE RESTRICTING USE OF THE ARM. PLEASE LET PATIENT KNOW WHAT DR. JUNG ADVISES.

## 2024-11-01 NOTE — TELEPHONE ENCOUNTER
PATIENT WILL NEED TO DISCUSS WITH ADAN AT NEXT APPOINTMENT ON 11/17. IF PATIENT WOULD LIKE TO BE SCHEDULED SOONER TO DISCUSS THAT IS FINE AS WELL. PLEASE CALL AND SEE IF PATIENT CAN HAVE AN EARLIER APPOINTMENT WITH ADAN OR DR. JUNG.

## 2024-11-07 ENCOUNTER — OFFICE VISIT (OUTPATIENT)
Dept: ORTHOPEDIC SURGERY | Facility: CLINIC | Age: 54
End: 2024-11-07
Payer: COMMERCIAL

## 2024-11-07 VITALS
SYSTOLIC BLOOD PRESSURE: 120 MMHG | WEIGHT: 207 LBS | HEART RATE: 57 BPM | OXYGEN SATURATION: 98 % | BODY MASS INDEX: 35.34 KG/M2 | HEIGHT: 64 IN | DIASTOLIC BLOOD PRESSURE: 80 MMHG

## 2024-11-07 DIAGNOSIS — S46.009A ROTATOR CUFF INJURY, INITIAL ENCOUNTER: Primary | ICD-10-CM

## 2024-11-07 NOTE — PROGRESS NOTES
"Chief Complaint  Follow-up of the Right Shoulder    Subjective          History of Present Illness      Sigrid Morgan is a 54 y.o. female  presents to [facility] for     Patient presents with her mother for follow-up evaluation of right shoulder pain.  She states that the injection she received from Dr. Barrientos on 10/17/2024 did not help her shoulder pain.  She has an MRI scheduled for November 12 and a follow-up appointment on November 15.  She states her job is very strenuous and she feels she is making the shoulder pain worse by working she is requesting a note to be off until she has MRI results.      Allergies   Allergen Reactions    Iodine Rash    Latex Rash        Social History     Socioeconomic History    Marital status: Single   Tobacco Use    Smoking status: Some Days     Types: Cigars     Start date: 1/1/2013     Passive exposure: Current    Smokeless tobacco: Never   Vaping Use    Vaping status: Never Used   Substance and Sexual Activity    Alcohol use: Not Currently    Drug use: Yes     Types: Marijuana    Sexual activity: Not Currently     Partners: Male     Birth control/protection: None        REVIEW OF SYSTEMS    Constitutional: Awake alert and oriented x3, no acute distress, denies fevers, chills, weight loss  Respiratory: No respiratory distress  Vascular: Brisk cap refill, Intact distal pulses, No cyanosis, compartments soft with no signs or symptoms of compartment syndrome or DVT.   Cardiovascular: Denies chest pain, shortness of breath  Skin: Denies rashes, acute skin changes  Neurologic: Denies headache, loss of consciousness  MSK: Right shoulder pain      Objective   Vital Signs:   /80   Pulse 57   Ht 163.8 cm (64.49\")   Wt 93.9 kg (207 lb)   SpO2 98%   BMI 35.00 kg/m²     Body mass index is 35 kg/m².    Physical Exam       Right upper extremity: tender over the anterior lateral shoulder, forward elevation to 100 degrees with pain, abduction to 90 " degrees with pain, external rotation at the side is 55 degrees with pain, internal rotation to SI joint, external rotation to 70 degrees, internal rotation to 60 degrees, 4 plus supraspinatus strength , 5/5 infraspinatus and subscap, positive impingement, positive cross arm, neurovascularly intact       Procedures    Imaging Results (Most Recent)       None             Result Review :   The following data was reviewed by: GABBY Plummer on 11/07/2024:               Assessment and Plan    Diagnoses and all orders for this visit:    1. Rotator cuff injury, initial encounter (Primary)        Discussed diagnosis and treatment options with the patient she was advised we can have her off until we get her MRI results back and reviewed with her next week on the 15th, remain off of work until 11/15/2024 appointment    Call or return if worsening symptoms.    Follow Up   Return in about 8 days (around 11/15/2024) for Recheck.  Patient was given instructions and counseling regarding her condition or for health maintenance advice. Please see specific information pulled into the AVS if appropriate.       EMR Dragon/Transcription disclaimer:  Part of this note may be an electronic transcription/translation of spoken language to printed text using the Dragon Dictation System

## 2024-11-11 ENCOUNTER — TELEPHONE (OUTPATIENT)
Dept: ORTHOPEDIC SURGERY | Facility: CLINIC | Age: 54
End: 2024-11-11
Payer: COMMERCIAL

## 2024-11-11 NOTE — TELEPHONE ENCOUNTER
Hub staff attempted to follow warm transfer process and was unsuccessful     Caller: [facility]    Relationship to patient:     Best call back number: [phone]    Patient is needing: GEMMA STATES SHE HAS SENT SECURE CHATS/MESSAGES BUT HAS NOT HAD A RESPONSE. GEMMA STATES MRI FOR SHOULDER HAS BEEN DENIED AND PT IS SCHEDULED FOR TOMORROW 11/12/24. GEMMA WANTING TO KNOW IF PT NEEDS TO BE CANCELLED OR R/S. PLEASE ADVISE.

## 2024-11-12 ENCOUNTER — HOSPITAL ENCOUNTER (OUTPATIENT)
Dept: MRI IMAGING | Facility: HOSPITAL | Age: 54
Discharge: HOME | End: 2024-11-12
Payer: COMMERCIAL

## 2024-11-12 DIAGNOSIS — S46.009A ROTATOR CUFF INJURY, INITIAL ENCOUNTER: ICD-10-CM

## 2024-11-12 DIAGNOSIS — M54.12 CERVICAL RADICULOPATHY: ICD-10-CM

## 2024-11-12 DIAGNOSIS — M25.372 ANKLE INSTABILITY, LEFT: ICD-10-CM

## 2024-11-12 DIAGNOSIS — M95.8 OSTEOCHONDRAL DEFECT: ICD-10-CM

## 2024-11-12 PROCEDURE — 73221 MRI JOINT UPR EXTREM W/O DYE: CPT

## 2024-11-12 PROCEDURE — 73721 MRI JNT OF LWR EXTRE W/O DYE: CPT

## 2024-11-14 ENCOUNTER — ANESTHESIA EVENT (OUTPATIENT)
Dept: GASTROENTEROLOGY | Facility: HOSPITAL | Age: 54
End: 2024-11-14
Payer: COMMERCIAL

## 2024-11-14 NOTE — ANESTHESIA PREPROCEDURE EVALUATION
Anesthesia Evaluation     Patient summary reviewed and Nursing notes reviewed   NPO Solid Status: > 8 hours  NPO Liquid Status: > 4 hours           Airway   Mallampati: I  TM distance: >3 FB  Neck ROM: full  No difficulty expected  Dental - normal exam     Pulmonary - normal exam    breath sounds clear to auscultation  (+) a smoker Current, Abstained day of surgery,  Cardiovascular - normal exam  Exercise tolerance: good (4-7 METS)    Rhythm: regular  Rate: normal    (+) hypertension less than 2 medications      Neuro/Psych  (+) psychiatric history Depression  GI/Hepatic/Renal/Endo    (+) obesity, GERD well controlled    Musculoskeletal     Abdominal    Substance History   (+) drug use (marijuana)     OB/GYN          Other   arthritis,     ROS/Med Hx Other: Screening    No EKG on file     Recent lap appy on 10/21                         Anesthesia Plan    ASA 3     general   total IV anesthesia  (Total IV Anesthesia    Patient understands anesthesia not responsible for dental damage.  )  intravenous induction     Anesthetic plan, risks, benefits, and alternatives have been provided, discussed and informed consent has been obtained with: patient.  Pre-procedure education provided  Plan discussed with CRNA.      CODE STATUS:

## 2024-11-15 ENCOUNTER — OFFICE VISIT (OUTPATIENT)
Dept: ORTHOPEDIC SURGERY | Facility: CLINIC | Age: 54
End: 2024-11-15
Payer: COMMERCIAL

## 2024-11-15 VITALS
OXYGEN SATURATION: 98 % | DIASTOLIC BLOOD PRESSURE: 82 MMHG | SYSTOLIC BLOOD PRESSURE: 122 MMHG | BODY MASS INDEX: 35.68 KG/M2 | WEIGHT: 209 LBS | HEIGHT: 64 IN | HEART RATE: 55 BPM

## 2024-11-15 DIAGNOSIS — M19.011 ARTHRITIS OF RIGHT SHOULDER REGION: ICD-10-CM

## 2024-11-15 DIAGNOSIS — M75.101 TEAR OF RIGHT ROTATOR CUFF, UNSPECIFIED TEAR EXTENT, UNSPECIFIED WHETHER TRAUMATIC: ICD-10-CM

## 2024-11-15 DIAGNOSIS — S46.009A ROTATOR CUFF INJURY, INITIAL ENCOUNTER: Primary | ICD-10-CM

## 2024-11-15 NOTE — PROGRESS NOTES
"Chief Complaint  Follow-up of the Right Shoulder    Subjective          History of Present Illness      Sigrid Morgan is a 54 y.o. female  presents to [facility] for     Presents for follow-up evaluation of right shoulder pain and to review right shoulder MRI.  She originally saw Dr. Barrientos on 10/17/2024 she received a right shoulder steroid injection which gave her minimal relief.  She has had shoulder pain for several months she also has a neck issue that she sees pain management for she states that the shoulder is achy she has pain with range of motion she has had an x-ray through her primary care physician.  Dr. Barrientos ordered MRI and she is here to review this.  She states she is in severe pain and has limited range of motion and would like to pursue surgical intervention if it is an option.      Allergies   Allergen Reactions    Iodine Rash    Latex Rash        Social History     Socioeconomic History    Marital status: Single   Tobacco Use    Smoking status: Some Days     Types: Cigars     Start date: 1/1/2013     Passive exposure: Current    Smokeless tobacco: Never   Vaping Use    Vaping status: Never Used   Substance and Sexual Activity    Alcohol use: Not Currently    Drug use: Yes     Types: Marijuana    Sexual activity: Not Currently     Partners: Male     Birth control/protection: None        REVIEW OF SYSTEMS    Constitutional: Awake alert and oriented x3, no acute distress, denies fevers, chills, weight loss  Respiratory: No respiratory distress  Vascular: Brisk cap refill, Intact distal pulses, No cyanosis, compartments soft with no signs or symptoms of compartment syndrome or DVT.   Cardiovascular: Denies chest pain, shortness of breath  Skin: Denies rashes, acute skin changes  Neurologic: Denies headache, loss of consciousness  MSK: Right shoulder pain      Objective   Vital Signs:   /82   Pulse 55   Ht 163.8 cm (64.49\")   Wt 94.8 kg (209 lb)   " SpO2 98%   BMI 35.33 kg/m²     Body mass index is 35.33 kg/m².    Physical Exam       Right upper extremity: tender over the anterior lateral shoulder, forward elevation to 100 degrees, abduction to 90 degrees, external rotation at the side is 55 degrees, internal rotation to SI joint, external rotation to 70 degrees, internal rotation to 60 degrees, 4 plus supraspinatus strength , 5/5 infraspinatus and subscap, positive impingement, positive cross arm, neurovascularly intact       Procedures    Imaging Results (Most Recent)       None             MRI Shoulder Right Without Contrast    Result Date: 11/14/2024  Narrative: MRI SHOULDER RIGHT WO CONTRAST Date of Exam: 11/12/2024 12:20 PM EST Indication: PAIN.  Comparison: None available. Technique:  Routine multiplanar/multisequence images of the right shoulder were obtained without contrast administration.  Findings: Changes of tendinopathy are noted throughout the rotator cuff. There is evidence for a focal full-thickness tear involving the distal attachment of the anterior fibers of the supraspinatus component. Mildly increased overlying bursal fluid is noted. The biceps anchor is intact. There may be changes of tendinopathy involving the proximal biceps tendon. The tendon is identified in the expected position in the intertubercular sulcus. There is no complete disruption or distal retraction. No significant labral tear is observed. The glenohumeral joint is intact. Moderate changes of osteoarthritis are noted. There is a small joint effusion. The acromioclavicular joint is intact. Mild hypertrophic age-related changes are noted. No abnormal focal bone marrow signal is observed. The  cortical margins are grossly intact. The surrounding soft tissues are unremarkable.     Impression: Impression: 1.Changes of tendinopathy are seen throughout the rotator cuff. There is a superimposed small focal full-thickness tear involving the distal attachment of the anterior  fibers of the supraspinatus component. 2.No evidence of biceps tendon tear or distal retraction. 3.No evidence for labral tear. 4.Moderate osteoarthritis of the glenohumeral joint. Mild hypertrophic age-related changes of the acromioclavicular joint are noted. Electronically Signed: Isaias Lu MD  11/14/2024 12:24 PM EST  Workstation ID: [id number]        Result Review :   The following data was reviewed by: GABBY Plummer on 11/15/2024:  Data reviewed : Radiologic studies reviewed by me with the patient              Assessment and Plan    Diagnoses and all orders for this visit:    1. Rotator cuff injury, initial encounter (Primary)  -     Ambulatory Referral to Orthopedic Surgery    2. Tear of right rotator cuff, unspecified tear extent, unspecified whether traumatic  -     Ambulatory Referral to Orthopedic Surgery    3. Arthritis of right shoulder region  -     Ambulatory Referral to Orthopedic Surgery        Reviewed MRI with the patient discussed diagnosis and treatment options with her due to patient's severe pain and [facility] she was given referral to [facility] for further evaluation and care with the intent of her being able to have surgery sooner than what [facility] offer.  Patient agreed to this plan.  Follow-up as needed    Call or return if worsening symptoms.    Follow Up   Return if symptoms worsen or fail to improve.  Patient was given instructions and counseling regarding her condition or for health maintenance advice. Please see specific information pulled into the AVS if appropriate.       EMR Dragon/Transcription disclaimer:  Part of this note may be an electronic transcription/translation of spoken language to printed text using the Dragon Dictation System

## 2024-11-18 ENCOUNTER — ANESTHESIA (OUTPATIENT)
Dept: GASTROENTEROLOGY | Facility: HOSPITAL | Age: 54
End: 2024-11-18
Payer: COMMERCIAL

## 2024-11-18 ENCOUNTER — TELEPHONE (OUTPATIENT)
Dept: FAMILY MEDICINE CLINIC | Facility: CLINIC | Age: 54
End: 2024-11-18
Payer: COMMERCIAL

## 2024-11-18 ENCOUNTER — HOSPITAL ENCOUNTER (OUTPATIENT)
Facility: HOSPITAL | Age: 54
Setting detail: HOSPITAL OUTPATIENT SURGERY
Discharge: HOME | End: 2024-11-18
Attending: STUDENT IN AN ORGANIZED HEALTH CARE EDUCATION/TRAINING PROGRAM | Admitting: STUDENT IN AN ORGANIZED HEALTH CARE EDUCATION/TRAINING PROGRAM
Payer: COMMERCIAL

## 2024-11-18 VITALS
DIASTOLIC BLOOD PRESSURE: 84 MMHG | WEIGHT: 198.19 LBS | HEIGHT: 64 IN | OXYGEN SATURATION: 100 % | RESPIRATION RATE: 16 BRPM | HEART RATE: 57 BPM | TEMPERATURE: 98.2 F | BODY MASS INDEX: 33.84 KG/M2 | SYSTOLIC BLOOD PRESSURE: 131 MMHG

## 2024-11-18 DIAGNOSIS — Z12.11 SCREENING FOR MALIGNANT NEOPLASM OF COLON: ICD-10-CM

## 2024-11-18 PROCEDURE — 25010000002 LIDOCAINE PF 2% 2 % SOLUTION: Performed by: NURSE ANESTHETIST, CERTIFIED REGISTERED

## 2024-11-18 PROCEDURE — 25010000002 PROPOFOL 10 MG/ML EMULSION: Performed by: NURSE ANESTHETIST, CERTIFIED REGISTERED

## 2024-11-18 RX ORDER — LIDOCAINE HYDROCHLORIDE 20 MG/ML
INJECTION, SOLUTION EPIDURAL; INFILTRATION; INTRACAUDAL; PERINEURAL AS NEEDED
Status: DISCONTINUED | OUTPATIENT
Start: 2024-11-18 | End: 2024-11-18 | Stop reason: SURG

## 2024-11-18 RX ORDER — SODIUM CHLORIDE 0.9 % (FLUSH) 0.9 %
3 SYRINGE (ML) INJECTION EVERY 12 HOURS SCHEDULED
Status: DISCONTINUED | OUTPATIENT
Start: 2024-11-18 | End: 2024-11-18 | Stop reason: HOSPADM

## 2024-11-18 RX ORDER — SODIUM CHLORIDE 9 MG/ML
40 INJECTION, SOLUTION INTRAVENOUS AS NEEDED
Status: ACTIVE | OUTPATIENT
Start: 2024-11-18 | End: 2024-11-18

## 2024-11-18 RX ORDER — ONDANSETRON 4 MG/1
4 TABLET, ORALLY DISINTEGRATING ORAL ONCE AS NEEDED
Status: DISCONTINUED | OUTPATIENT
Start: 2024-11-18 | End: 2024-11-18 | Stop reason: HOSPADM

## 2024-11-18 RX ORDER — SODIUM CHLORIDE 0.9 % (FLUSH) 0.9 %
10 SYRINGE (ML) INJECTION AS NEEDED
Status: DISCONTINUED | OUTPATIENT
Start: 2024-11-18 | End: 2024-11-18 | Stop reason: HOSPADM

## 2024-11-18 RX ORDER — SODIUM CHLORIDE, SODIUM LACTATE, POTASSIUM CHLORIDE, CALCIUM CHLORIDE 600; 310; 30; 20 MG/100ML; MG/100ML; MG/100ML; MG/100ML
30 INJECTION, SOLUTION INTRAVENOUS CONTINUOUS
Status: DISCONTINUED | OUTPATIENT
Start: 2024-11-18 | End: 2024-11-18 | Stop reason: HOSPADM

## 2024-11-18 RX ORDER — PROPOFOL 10 MG/ML
VIAL (ML) INTRAVENOUS AS NEEDED
Status: DISCONTINUED | OUTPATIENT
Start: 2024-11-18 | End: 2024-11-18 | Stop reason: SURG

## 2024-11-18 RX ADMIN — LIDOCAINE HYDROCHLORIDE 100 MG: 20 INJECTION, SOLUTION INTRAVENOUS at 13:49

## 2024-11-18 RX ADMIN — PROPOFOL 200 MCG/KG/MIN: 10 INJECTION, EMULSION INTRAVENOUS at 13:51

## 2024-11-18 RX ADMIN — PROPOFOL 100 MG: 10 INJECTION, EMULSION INTRAVENOUS at 13:50

## 2024-11-18 NOTE — ANESTHESIA POSTPROCEDURE EVALUATION
Patient: Sigrid Morgan    Procedure Summary       Date: 11/18/24 Room / Location: [facility] ENDOSCOPY 4 / [facility] ENDOSCOPY    Anesthesia Start: 1347 Anesthesia Stop: 1415    Procedure: COLONOSCOPY Diagnosis:       Screening for malignant neoplasm of colon      (Screening for malignant neoplasm of colon [Z12.11])    Surgeons: Yair Scott MD Provider: Peña Schumacher CRNA    Anesthesia Type: general ASA Status: 3            Anesthesia Type: general    Vitals  Vitals Value Taken Time   /84 11/18/24 1431   Temp 36.8 °C (98.2 °F) 11/18/24 1429   Pulse 57 11/18/24 1432   Resp 16 11/18/24 1429   SpO2 100 % 11/18/24 1432   Vitals shown include unfiled device data.        Post Anesthesia Care and Evaluation    Patient location during evaluation: bedside  Patient participation: complete - patient participated  Level of consciousness: awake  Pain management: adequate    Airway patency: patent  Anesthetic complications: No anesthetic complications  PONV Status: controlled  Cardiovascular status: acceptable and stable  Respiratory status: acceptable, spontaneous ventilation and room air

## 2024-11-18 NOTE — TELEPHONE ENCOUNTER
DELETE AFTER REVIEWING: Send this encounter to the appropriate pool. See your Call Action Grid or Workflows for direction.    Caller: Sigrid Morgan    Relationship: Self    Best call back number:   Telephone Information:   Mobile [phone]        What form or medical record are you requesting: FMLA     Who is requesting this form or medical record from you: EMPLOYER         Timeframe paperwork needed: ASAP     Additional notes: PATIENT WENT TO MRI WITH ORTHO STATES SHE HAS TORN LIGAMENT AND WAS TOLD SHE COULDN'T GET SURGERY SHE GOT REFERRED TO [place] [facility] WAS PUT ON A 10 POUND WEIGHT LIMIT RESTRICTION AND IS UNABLE TO DO MUCH OF ANYTHING AT HER JOB WITH 10 POUND WEIGHT LIMIT     REQUESTING SOME SORT OF INTERMITTENCE FMLA

## 2024-11-19 ENCOUNTER — OFFICE VISIT (OUTPATIENT)
Dept: FAMILY MEDICINE CLINIC | Facility: CLINIC | Age: 54
End: 2024-11-19
Payer: COMMERCIAL

## 2024-11-19 VITALS
DIASTOLIC BLOOD PRESSURE: 88 MMHG | HEART RATE: 67 BPM | SYSTOLIC BLOOD PRESSURE: 143 MMHG | BODY MASS INDEX: 34.97 KG/M2 | OXYGEN SATURATION: 98 % | HEIGHT: 64 IN | WEIGHT: 204.8 LBS

## 2024-11-19 DIAGNOSIS — S46.009D ROTATOR CUFF INJURY, SUBSEQUENT ENCOUNTER: Primary | ICD-10-CM

## 2024-11-19 PROCEDURE — 99213 OFFICE O/P EST LOW 20 MIN: CPT

## 2024-11-19 NOTE — TELEPHONE ENCOUNTER
Caller: Sigrid Morgan    Relationship: Self    Best call back number:     [phone]     What was the call regarding: PATIENT CALLED TO CHECK ON THIS REQUEST. SHE STATES THAT SHE NEEDS THIS AS SOON AS POSSIBLE SO THAT HER ABSENCE FROM WORK WILL BE EXCUSED.     PATIENT STATES THAT SHE GOT AN APPOINTMENT WITH ORTHO FOR 11.21.24

## 2024-11-19 NOTE — PROGRESS NOTES
Chief Complaint  Shoulder Pain (Rotator cuff tear, right )    SUBJECTIVE  Sigrid Morgan presents to [facility]    History of Present Illness  Patient is a 54-year-old female presents today for right shoulder pain.  Patient was seen by Ortho and diagnosed with complete right rotator cuff tear.  She was referred to the orthopedic refillable.  Patient has an appointment on the .  Patient has work restrictions to not be able to lift over 10 pounds in 2 only use the right arm periodically with assistance.  Patient reports this will not work for her job.  She is requesting a letter to have her off until she is cleared by orthopedics in [place].     Past Medical History:   Diagnosis Date    Acid reflux     Acute appendicitis with localized peritonitis, without perforation, abscess, or gangrene 10/21/2024    Arthritis     Breast mass     Bunion     Chronic pain     Depression     Eczema     Foot pain, left     History of transfusion     Hypertension     Toenail deformity       Family History   Problem Relation Age of Onset    Arthritis Mother     Depression Mother     Hyperlipidemia Mother     Cancer Paternal Grandmother     Diabetes Brother       Past Surgical History:   Procedure Laterality Date    APPENDECTOMY N/A 10/21/2024    Procedure: APPENDECTOMY LAPAROSCOPIC;  Surgeon: Yair Scott MD;  Location: [facility] MAIN OR;  Service: General;  Laterality: N/A;     SECTION      COLONOSCOPY N/A 2024    Procedure: COLONOSCOPY;  Surgeon: Yair Scott MD;  Location: [facility] ENDOSCOPY;  Service: General;  Laterality: N/A;  diverticulitis    HYSTERECTOMY          Current Outpatient Medications:     cetirizine (zyrTEC) 10 MG tablet, Take 1 tablet by mouth Daily., Disp: 90 tablet, Rfl: 1    diclofenac (VOLTAREN) 50 MG EC tablet, TAKE 1 TABLET BY MOUTH 2 TIMES A DAY, Disp: 180 tablet, Rfl: 0    gabapentin (NEURONTIN) 100 MG capsule, Take 1 capsule by mouth 3 (Three)  "Times a Day., Disp: , Rfl:     hydroCHLOROthiazide 25 MG tablet, TAKE 1 TABLET BY MOUTH DAILY, Disp: 90 tablet, Rfl: 1    ibuprofen (ADVIL,MOTRIN) 600 MG tablet, Take 1 tablet by mouth Every 6 (Six) Hours As Needed for Mild Pain or Moderate Pain., Disp: 30 tablet, Rfl: 0    Ibuprofen 3 %, Gabapentin 10 %, Baclofen 2 %, lidocaine 4 %, Ketamine HCl 4 %, Apply 1-2 g topically to the appropriate area as directed 3 (Three) to 4 (Four) times daily., Disp: 90 g, Rfl: 2    triamcinolone (KENALOG) 0.5 % cream, Apply 1 Application topically to the appropriate area as directed 3 (Three) Times a Day As Needed for Irritation or Rash., Disp: , Rfl:     acetaminophen (TYLENOL) 500 MG tablet, Take 1 tablet by mouth Every 4 (Four) Hours As Needed for Mild Pain or Moderate Pain. (Patient not taking: Reported on 11/19/2024), Disp: 30 tablet, Rfl: 0    acetaminophen (TYLENOL) 650 MG 8 hr tablet, Take 1 tablet by mouth Every 8 (Eight) Hours As Needed for Mild Pain or Headache. (Patient not taking: Reported on 10/24/2024), Disp: , Rfl:     oxyCODONE (ROXICODONE) 5 MG immediate release tablet, Take 1 tablet by mouth Every 6 (Six) Hours As Needed for Severe Pain. (Patient not taking: Reported on 11/19/2024), Disp: 15 tablet, Rfl: 0    OBJECTIVE  Vital Signs:   /88 (BP Location: Left arm, Patient Position: Sitting, Cuff Size: Large Adult)   Pulse 67   Ht 162.6 cm (64.02\")   Wt 92.9 kg (204 lb 12.8 oz)   SpO2 98%   BMI 35.14 kg/m²    Estimated body mass index is 35.14 kg/m² as calculated from the following:    Height as of this encounter: 162.6 cm (64.02\").    Weight as of this encounter: 92.9 kg (204 lb 12.8 oz).     Wt Readings from Last 3 Encounters:   11/19/24 92.9 kg (204 lb 12.8 oz)   11/18/24 89.9 kg (198 lb 3.1 oz)   11/15/24 94.8 kg (209 lb)     BP Readings from Last 3 Encounters:   11/19/24 143/88   11/18/24 131/84   11/15/24 122/82       Physical Exam  Vitals reviewed.   Constitutional:       General: She is not in " acute distress.     Appearance: She is not ill-appearing.   HENT:      Head: Normocephalic and atraumatic.   Eyes:      Conjunctiva/sclera: Conjunctivae normal.   Cardiovascular:      Rate and Rhythm: Normal rate and regular rhythm.      Heart sounds: Normal heart sounds.   Pulmonary:      Effort: Pulmonary effort is normal.      Breath sounds: Normal breath sounds.   Musculoskeletal:      Right shoulder: Tenderness present. Decreased range of motion.      Cervical back: Normal range of motion.   Neurological:      Mental Status: She is alert and oriented to person, place, and time.   Psychiatric:         Mood and Affect: Mood normal.         Behavior: Behavior normal.         Thought Content: Thought content normal.         Judgment: Judgment normal.          Result Review    Common labs          2/29/2024    08:24 10/18/2024    07:34 10/21/2024    12:09   Common Labs   Glucose 92  104  107    BUN 23  19  16    Creatinine 1.02  0.90  0.95    Sodium 143  136  132    Potassium 4.1  4.3  4.1    Chloride 104  103  95    Calcium 8.8  9.3  9.1    Albumin 4.2  3.9  4.1    Total Bilirubin 0.5  0.3  0.9    Alkaline Phosphatase 48  57  57    AST (SGOT) 21  18  11    ALT (SGPT) 13  17  12    WBC 5.13  8.75  15.95    Hemoglobin 11.9  11.6  12.5    Hematocrit 36.2  36.4  38.3    Platelets 213  282  275    Total Cholesterol 153      Triglycerides 34      HDL Cholesterol 61      LDL Cholesterol  84              MRI Shoulder Right Without Contrast    Result Date: 11/14/2024  Impression: 1.Changes of tendinopathy are seen throughout the rotator cuff. There is a superimposed small focal full-thickness tear involving the distal attachment of the anterior fibers of the supraspinatus component. 2.No evidence of biceps tendon tear or distal retraction. 3.No evidence for labral tear. 4.Moderate osteoarthritis of the glenohumeral joint. Mild hypertrophic age-related changes of the acromioclavicular joint are noted. Electronically Signed:  Isaias Lu MD  11/14/2024 12:24 PM EST  Workstation ID: [id number]        XR Shoulder 2+ View Right    Result Date: 10/7/2024  Impression: Mild degenerative changes without evidence of acute fracture or dislocation Electronically Signed: Grupo Robertson MD  10/7/2024 4:08 PM EDT  Workstation ID: [id number]        The above data has been reviewed by DENNIS Quinonez 11/19/2024 14:34 EST.          Patient Care Team:  Camilla Mena APRN as PCP - General (Nurse Practitioner)  Ade Daly APRN as Nurse Practitioner (Nurse Practitioner)            ASSESSMENT & PLAN    Diagnoses and all orders for this visit:    1. Rotator cuff injury, subsequent encounter (Primary)  Comments:  given letter to be off work with limited restrictions until she can be seen by ortho         Tobacco Use: High Risk (11/20/2024)    Patient History     Smoking Tobacco Use: Some Days     Smokeless Tobacco Use: Never     Passive Exposure: Current       Follow Up     Return if symptoms worsen or fail to improve.      Patient was given instructions and counseling regarding her condition or for health maintenance advice. Please see specific information pulled into the AVS if appropriate.   I have reviewed information obtained and documented by others and I have confirmed the accuracy of this documented note.    DENNIS Quinonez

## 2024-11-19 NOTE — LETTER
November 19, 2024     Patient: Sigrid Morgan   YOB: 1970   Date of Visit: 11/19/2024       To Whom It May Concern:    It is my medical opinion that Sigrid Morgan should remain out of work until 11/22/24 due to full rotator cuff tear. She cannot lift anything over 10 lbs, and may only use that arm occasionally with assistance.  .           Sincerely,        DENNIS Quinonez

## 2024-11-20 ENCOUNTER — OFFICE VISIT (OUTPATIENT)
Dept: PODIATRY | Facility: CLINIC | Age: 54
End: 2024-11-20
Payer: COMMERCIAL

## 2024-11-20 VITALS
BODY MASS INDEX: 35 KG/M2 | OXYGEN SATURATION: 97 % | HEART RATE: 58 BPM | TEMPERATURE: 97.8 F | SYSTOLIC BLOOD PRESSURE: 149 MMHG | WEIGHT: 204 LBS | DIASTOLIC BLOOD PRESSURE: 98 MMHG

## 2024-11-20 DIAGNOSIS — M19.172 POST-TRAUMATIC OSTEOARTHRITIS OF LEFT FOOT: ICD-10-CM

## 2024-11-20 DIAGNOSIS — M25.372 ANKLE INSTABILITY, LEFT: Primary | ICD-10-CM
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
## 2024-11-22 ENCOUNTER — TELEPHONE (OUTPATIENT)
Dept: FAMILY MEDICINE CLINIC | Facility: CLINIC | Age: 54
End: 2024-11-22

## 2024-11-22 NOTE — TELEPHONE ENCOUNTER
Caller: Sigrid Morgan    Relationship: Self    Best call back number: [phone]     What is the best time to reach you: ANY    Who are you requesting to speak with (clinical staff, provider,  specific staff member): CLINICAL STAFF    What was the call regarding: PATIENT CALLED STATING THAT SHE WOULD LIKE TO SPEAK TO A NURSE REGARDING PAPERWORK THAT SHE NEEDS TO BE COMPLETED.

## 2024-11-26 ENCOUNTER — TELEPHONE (OUTPATIENT)
Dept: FAMILY MEDICINE CLINIC | Facility: CLINIC | Age: 54
End: 2024-11-26
Payer: COMMERCIAL

## 2024-11-26 NOTE — TELEPHONE ENCOUNTER
Pt aware that Mee will fill paperwork out and we will call her when it is ready to be picked up

## 2024-11-27 ENCOUNTER — TREATMENT (OUTPATIENT)
Dept: PHYSICAL THERAPY | Facility: CLINIC | Age: 54
End: 2024-11-27
Payer: COMMERCIAL

## 2024-11-27 ENCOUNTER — TRANSCRIBE ORDERS (OUTPATIENT)
Dept: PHYSICAL THERAPY | Facility: CLINIC | Age: 54
End: 2024-11-27
Payer: COMMERCIAL

## 2024-11-27 DIAGNOSIS — S46.009A ROTATOR CUFF INJURY, INITIAL ENCOUNTER: Primary | ICD-10-CM

## 2024-11-27 DIAGNOSIS — M25.511 ACUTE PAIN OF RIGHT SHOULDER: ICD-10-CM

## 2024-11-27 DIAGNOSIS — M75.101 TEAR OF RIGHT SUPRASPINATUS TENDON: Primary | ICD-10-CM

## 2024-11-27 DIAGNOSIS — M25.611 DECREASED ROM OF RIGHT SHOULDER: ICD-10-CM

## 2024-11-27 NOTE — PROGRESS NOTES
Occupational Therapy Initial Evaluation and Plan of Care  Michael  OT: [address]  [place] [address]    Patient: Sigrid Morgan   : 1970  Diagnosis/ICD-10 Code:  Tear of right supraspinatus tendon [M75.101]  Referring practitioner: Kirstie Garza MD  Date of Initial Visit: 2024  Today's Date: 2024  Patient seen for 1 sessions           Subjective Questionnaire: QuickDASH: 49/55      Subjective Evaluation    History of Present Illness  Mechanism of injury: Pt is a RHD 55 y/o female who presents to therapy with c/o R shoulder pain. Pt reports 1-2 month history of R shoulder pain. Pt reports on 10/17 she received an injection which provided some relief of symptoms. MRI revealed:  1.Changes of tendinopathy are seen throughout the rotator cuff. There is a superimposed small focal full-thickness tear involving the distal attachment of the anterior fibers of the supraspinatus component.  2.No evidence of biceps tendon tear or distal retraction.  3.No evidence for labral tear.  4.Moderate osteoarthritis of the glenohumeral joint. Mild hypertrophic age-related changes of the acromioclavicular joint are noted.  Pt goes to pain management for cervical spine and lumbar spine pain. Pt is a  but is currently off work due to injury.  PMHx: arthritis, depression, appendectomy.        Precautions and Work Restrictions: 10 lbsPain  Current pain ratin  At best pain ratin  At worst pain rating: 10  Location: anterior/lateral R shoulder  Quality: throbbing and dull ache  Relieving factors: heat  Aggravating factors: movement, lifting and overhead activity  Progression: worsening    Social Support  Lives with: adult children and parents    Hand dominance: right    Diagnostic Tests  MRI studies: abnormal    Treatments  Previous treatment: injection treatment  Patient Goals  Patient goals for therapy: decreased pain, increased motion, increased strength, independence with ADLs/IADLs and return to  "sport/leisure activities  Patient goal: \"Mobility\"           Objective          Tenderness     Additional Tenderness Details  Moderate to severe R shoulder girdle. Severe at R proximal biceps. Severe at R supraspinatus.      Neurological Testing     Additional Neurological Details  Pt reports numbness in R ulnar nerve distribution. Pt reports tingling in all digits R and L hands.    Active Range of Motion     Right Shoulder   Flexion: 90 degrees with pain  Abduction: 50 degrees with pain  Internal rotation BTB: sacrum with pain    Additional Active Range of Motion Details  ER to ear with pain.    Passive Range of Motion     Right Shoulder   Flexion: 40 degrees with pain  Abduction: 50 degrees with pain  External rotation 0°: 20 degrees with pain    Strength/Myotome Testing     Additional Strength Details  Deferred secondary to pain.          Assessment & Plan       Assessment  Impairments: abnormal or restricted ROM, activity intolerance, impaired physical strength, lacks appropriate home exercise program and pain with function   Functional limitations: carrying objects, lifting, sleeping, pulling, pushing, uncomfortable because of pain, moving in bed, reaching behind back and reaching overhead   Assessment details: Pt will benefit from skilled OT secondary to decreased strength/ROM and increased pain that limits pt ability to complete ADLs.  Prognosis: good    Goals  Plan Goals: SHOULDER  PROBLEMS:     1. The patient has limited ROM of the R shoulder.    LTG 1: 12 weeks:  The patient will demonstrate 140 degrees of active shoulder flexion, 140 degrees of shoulder abduction, external rotation to behind head, and internal rotation to lumbar spine to allow the patient to reach into upper kitchen cabinets and manipulate clothing behind the back with greater ease.    STATUS:  New   STG 1a: 8 weeks:  The patient will demonstrate 140 degrees of passive shoulder flexion, 140 degrees of passive shoulder abduction, 40 " degrees of passive shoulder external rotation, and 40 degrees of passive shoulder internal rotation.    STATUS:  New   TREATMENT: Manual therapy, therapeutic exercise, home exercise instruction, and modalities as needed to include: electrical stimulation, ultrasound, moist heat, and ice.    2. The patient has limited strength of the R shoulder.   LTG 2: 12 weeks:  The patient will demonstrate 5 /5 strength for R shoulder flexion, abduction, external rotation, and internal rotation in order to demonstrate improved shoulder stability.    STATUS:  New   STG 2a: 8 weeks:  The patient will demonstrate ability to tolerate MMT for R shoulder flexion, abduction, external rotation, and internal rotation.    STATUS:  New   STG2b:  8 weeks:  The patient will be independent with home exercises.     STATUS:  New   TREATMENT: Manual therapy, therapeutic exercise, home exercise instruction, and modalities as needed to include: electrical stimulation, ultrasound, moist heat, and ice.     3. The patient complains of pain to the R shoulder.   LTG 3: 12 weeks:  The patient will report a pain rating of 1 /10 or better in order to improve sleep quality and tolerance to performance of activities of daily living.    STATUS:  New   STG 3a: 8 weeks:  The patient will report a pain rating of 3 /10 or better.     STATUS:  New   TREATMENT: Manual therapy, therapeutic exercise, home exercise instruction, and modalities as needed to include: electrical stimulation, ultrasound, moist heat, and ice.    4. Carrying, Moving, and Handling Objects Functional Limitation     LTG 4: 12 weeks:  The patient will demonstrate 20-39 % limitation by achieving a score of 27 on the QuickDASH.    STATUS:  New   STG 4a: 8 weeks:  The patient will demonstrate 40-59 % limitation by achieving a score of 36 on the QuickDASH.      STATUS:  New   TREATMENT:  Manual therapy, therapeutic exercise, home exercise instruction, and modalities as needed to include: moist heat,  electrical stimulation, and ultrasound.     Plan  Planned modality interventions: cryotherapy and thermotherapy (hydrocollator packs)  Planned therapy interventions: body mechanics training, fine motor coordination training, flexibility, functional ROM exercises, home exercise program, joint mobilization, manual therapy, neuromuscular re-education, postural training, soft tissue mobilization, strengthening, stretching and therapeutic activities  Frequency: 2x week  Duration in weeks: 12  Treatment plan discussed with: patient      Pt is indicated for skilled occupational therapy services.  Timed:           Therapeutic Exercise:    10     mins  [id number];       Un-Timed:  Low Eval     28     Mins  [id number]    Timed Treatment:   10   mins   Total Treatment:     38   mins    Start time: 0808  End time: 0846    OT SIGNATURE: Cory Mei OT   DATE TREATMENT INITIATED: 11/27/2024  [place] License: [id number]    Initial Certification  Certification Period: 2/24/2025  I certify that the therapy services are furnished while this patient is under my care.  The services outlined above are required by this patient, and will be reviewed every 90 days.     PHYSICIAN: Kirstie Garza MD      DATE:   MD NPI: [id number]  Please sign and return via fax to   [phone].. Thank you, [facility] Occupational Therapy.

## 2024-12-05 ENCOUNTER — TREATMENT (OUTPATIENT)
Dept: PHYSICAL THERAPY | Facility: CLINIC | Age: 54
End: 2024-12-05
Payer: COMMERCIAL

## 2024-12-05 DIAGNOSIS — M25.511 ACUTE PAIN OF RIGHT SHOULDER: ICD-10-CM

## 2024-12-05 DIAGNOSIS — M75.101 TEAR OF RIGHT SUPRASPINATUS TENDON: Primary | ICD-10-CM

## 2024-12-05 DIAGNOSIS — M25.611 DECREASED ROM OF RIGHT SHOULDER: ICD-10-CM

## 2024-12-05 NOTE — PROGRESS NOTES
Occupational Therapy Daily Treatment Note  Michael OT: [address] [place] [address]      Patient: Sigrid Morgan   : 1970  Diagnosis/ICD-10 Code:  Tear of right supraspinatus tendon [M75.101]  Referring practitioner: Kirstie Garza MD  Date of Initial Visit: Type: THERAPY  Noted: 2024  Today's Date: 2024  Patient seen for 2 sessions           Subjective   Sigrid Morgan reports: 7/10 pain R shoulder. Pt reports she feels like the injection is wearing off.    Objective     See Exercise, Manual, and Modality Logs for complete treatment.       Assessment/Plan  Pt reports pain with sidelying ER and lower trap strengthening (activities ended). Good tolerance to remainder of treatment. Pt continues with decreased fxl strength/ROM that limit ability to complete ADLs/IADLs.  Progress per Plan of Care           Timed:  Therapeutic Exercise:    10     mins  [id number];     Therapeutic Activity:     14     mins  [id number];       Timed Treatment:   24   mins   Total Treatment:     30   mins    Start time: 0830  End time: 0900    Cory Mei OT  Occupational Therapist  [place] License: [id number]  NPI: [id number]

## 2024-12-09 ENCOUNTER — TREATMENT (OUTPATIENT)
Dept: PHYSICAL THERAPY | Facility: CLINIC | Age: 54
End: 2024-12-09
Payer: COMMERCIAL

## 2024-12-09 DIAGNOSIS — M25.511 ACUTE PAIN OF RIGHT SHOULDER: ICD-10-CM

## 2024-12-09 DIAGNOSIS — M25.611 DECREASED ROM OF RIGHT SHOULDER: ICD-10-CM

## 2024-12-09 DIAGNOSIS — M75.101 TEAR OF RIGHT SUPRASPINATUS TENDON: Primary | ICD-10-CM

## 2024-12-09 PROCEDURE — 97110 THERAPEUTIC EXERCISES: CPT | Performed by: OCCUPATIONAL THERAPIST

## 2024-12-09 PROCEDURE — 97530 THERAPEUTIC ACTIVITIES: CPT | Performed by: OCCUPATIONAL THERAPIST

## 2024-12-09 NOTE — PROGRESS NOTES
Occupational Therapy Daily Treatment Note  Michael OT: [address] [place] [address]      Patient: Sigrid Morgan   : 1970  Diagnosis/ICD-10 Code:  Tear of right supraspinatus tendon [M75.101]  Referring practitioner: Kirstie Garza MD  Date of Initial Visit: Type: THERAPY  Noted: 2024  Today's Date: 2024  Patient seen for 3 sessions           Subjective   Sigrid Morgan reports: 7/10 pain R shoulder.    Objective     See Exercise, Manual, and Modality Logs for complete treatment.       Assessment/Plan  OT graded up duration on UBE and pulleys. OT graded up reps on wall ladder. OT graded up resistance with rows. OT provided rows with red band and no monies HEP. Teachback successful. Pt continues with decreased fxl strength/ROM and increased pain  that limit ability to complete ADLs/IADLs.  Progress per Plan of Care           Timed:  Therapeutic Exercise:    10     mins  99280;     Therapeutic Activity:     16     mins  38599;       Timed Treatment:   26   mins   Total Treatment:     26   mins    Start time: 0834  End time: 0900    Cory Mei OT  Occupational Therapist  [place] License: [id number]  NPI: [id number]

## 2024-12-12 ENCOUNTER — TREATMENT (OUTPATIENT)
Dept: PHYSICAL THERAPY | Facility: CLINIC | Age: 54
End: 2024-12-12
Payer: COMMERCIAL

## 2024-12-12 ENCOUNTER — OFFICE VISIT (OUTPATIENT)
Dept: FAMILY MEDICINE CLINIC | Facility: CLINIC | Age: 54
End: 2024-12-12
Payer: COMMERCIAL

## 2024-12-12 VITALS
WEIGHT: 210.6 LBS | DIASTOLIC BLOOD PRESSURE: 82 MMHG | SYSTOLIC BLOOD PRESSURE: 149 MMHG | OXYGEN SATURATION: 100 % | BODY MASS INDEX: 35.96 KG/M2 | HEIGHT: 64 IN | HEART RATE: 58 BPM

## 2024-12-12 DIAGNOSIS — R23.2 HOT FLASHES: ICD-10-CM

## 2024-12-12 DIAGNOSIS — I10 PRIMARY HYPERTENSION: Primary | ICD-10-CM

## 2024-12-12 DIAGNOSIS — M75.101 TEAR OF RIGHT SUPRASPINATUS TENDON: Primary | ICD-10-CM

## 2024-12-12 DIAGNOSIS — M25.511 ACUTE PAIN OF RIGHT SHOULDER: ICD-10-CM

## 2024-12-12 DIAGNOSIS — M25.611 DECREASED ROM OF RIGHT SHOULDER: ICD-10-CM

## 2024-12-12 PROCEDURE — 99214 OFFICE O/P EST MOD 30 MIN: CPT

## 2024-12-12 NOTE — PROGRESS NOTES
Occupational Therapy Daily Treatment Note  Michael OT: [address] [place] [address]      Patient: Sigrid Morgan   : 1970  Diagnosis/ICD-10 Code:  Tear of right supraspinatus tendon [M75.101]  Referring practitioner: Kirstie Garza MD  Date of Initial Visit: Type: THERAPY  Noted: 2024  Today's Date: 2024  Patient seen for 4 sessions           Subjective   Sigrid Morgan reports: 7/10 pain R shoulder    Objective     See Exercise, Manual, and Modality Logs for complete treatment.       Assessment/Plan  Pt reports pain with rows. OT encouraged pt to not pull elbows past ribs. Pt reports improved tolerance. Good tolerance to remainder of treatment. OT graded up resistance and reps with rows. Pt continues with decreased fxl strength/ROM that limit ability to complete ADLs/IADLs.  Progress per Plan of Care           Timed:  Therapeutic Exercise:    8     mins  48810;     Therapeutic Activity:     21     mins  68913;       Timed Treatment:   29   mins   Total Treatment:     29   mins    Start time: 0834  End time: 09    Cory Mei OT  Occupational Therapist  [place] License: [id number]  NPI: [id number]

## 2024-12-12 NOTE — PROGRESS NOTES
Chief Complaint  Hypertension and Hot Flashes    SUBJECTIVE  Sigrid Morgan presents to [facility]    History of Present Illness  Patient is 54-year-old female who presents today for hot flashes and htn.     HTN-currently on HCTZ 25 mg.  Patient's blood pressure in office today 149/82.  Patient reports 1 episode on  at [facility] after she went up moving around and that it was 185/80.  Patient denies any chest pain headache blurry vision.  Patient has been in a lot of pain due to her rotator cuff tear.  Ortho secondary to 6 weeks of physical therapy prior to having surgery.  Patient reports this causes her a lot of pain.  Patient is established with pain management who currently has her on gabapentin 100 mg 3 times a day.  She is also taking diclofenac in the a.m. Tylenol in p.m. for breakthrough pain.  Overall pain is not well-controlled.  She has an upcoming appointment with pain management.    She is also reporting more hot flashes and sweating.  Patient had hysterectomy in . She is scared of more medications/HRT.     Past Medical History:   Diagnosis Date    Acid reflux     Acute appendicitis with localized peritonitis, without perforation, abscess, or gangrene 10/21/2024    Arthritis     Breast mass     Bunion     Chronic pain     Depression     Eczema     Foot pain, left     History of transfusion     Hypertension     Toenail deformity       Family History   Problem Relation Age of Onset    Arthritis Mother     Depression Mother     Hyperlipidemia Mother     Cancer Paternal Grandmother     Diabetes Brother       Past Surgical History:   Procedure Laterality Date    APPENDECTOMY N/A 10/21/2024    Procedure: APPENDECTOMY LAPAROSCOPIC;  Surgeon: Yair Scott MD;  Location: [facility] MAIN OR;  Service: General;  Laterality: N/A;     SECTION      COLONOSCOPY N/A 2024    Procedure: COLONOSCOPY;  Surgeon: Yair Scott MD;  Location: [facility] ENDOSCOPY;   "Service: General;  Laterality: N/A;  diverticulitis    HYSTERECTOMY          Current Outpatient Medications:     cetirizine (zyrTEC) 10 MG tablet, Take 1 tablet by mouth Daily., Disp: 90 tablet, Rfl: 1    diclofenac (VOLTAREN) 50 MG EC tablet, TAKE 1 TABLET BY MOUTH 2 TIMES A DAY, Disp: 180 tablet, Rfl: 0    gabapentin (NEURONTIN) 100 MG capsule, Take 1 capsule by mouth 3 (Three) Times a Day., Disp: , Rfl:     hydroCHLOROthiazide 25 MG tablet, TAKE 1 TABLET BY MOUTH DAILY, Disp: 90 tablet, Rfl: 1    ibuprofen (ADVIL,MOTRIN) 600 MG tablet, Take 1 tablet by mouth Every 6 (Six) Hours As Needed for Mild Pain or Moderate Pain., Disp: 30 tablet, Rfl: 0    Ibuprofen 3 %, Gabapentin 10 %, Baclofen 2 %, lidocaine 4 %, Ketamine HCl 4 %, Apply 1-2 g topically to the appropriate area as directed 3 (Three) to 4 (Four) times daily., Disp: 90 g, Rfl: 2    triamcinolone (KENALOG) 0.5 % cream, Apply 1 Application topically to the appropriate area as directed 3 (Three) Times a Day As Needed for Irritation or Rash., Disp: , Rfl:     OBJECTIVE  Vital Signs:   /82 (BP Location: Left arm, Patient Position: Sitting, Cuff Size: Large Adult)   Pulse 58   Ht 162.6 cm (64.02\")   Wt 95.5 kg (210 lb 9.6 oz)   SpO2 100%   BMI 36.13 kg/m²    Estimated body mass index is 36.13 kg/m² as calculated from the following:    Height as of this encounter: 162.6 cm (64.02\").    Weight as of this encounter: 95.5 kg (210 lb 9.6 oz).     Wt Readings from Last 3 Encounters:   12/12/24 95.5 kg (210 lb 9.6 oz)   11/20/24 92.5 kg (204 lb)   11/19/24 92.9 kg (204 lb 12.8 oz)     BP Readings from Last 3 Encounters:   12/12/24 149/82   11/20/24 149/98   11/19/24 143/88       Physical Exam  Vitals reviewed.   Constitutional:       General: She is not in acute distress.     Appearance: She is not ill-appearing.   HENT:      Head: Normocephalic and atraumatic.   Eyes:      Conjunctiva/sclera: Conjunctivae normal.   Cardiovascular:      Rate and Rhythm: " Normal rate and regular rhythm.      Heart sounds: Normal heart sounds.   Pulmonary:      Effort: Pulmonary effort is normal.      Breath sounds: Normal breath sounds.   Musculoskeletal:      Cervical back: Normal range of motion.   Neurological:      Mental Status: She is alert and oriented to person, place, and time.   Psychiatric:         Mood and Affect: Mood normal.         Behavior: Behavior normal.         Thought Content: Thought content normal.         Judgment: Judgment normal.          Result Review    Common labs          2/29/2024    08:24 10/18/2024    07:34 10/21/2024    12:09   Common Labs   Glucose 92  104  107    BUN 23  19  16    Creatinine 1.02  0.90  0.95    Sodium 143  136  132    Potassium 4.1  4.3  4.1    Chloride 104  103  95    Calcium 8.8  9.3  9.1    Albumin 4.2  3.9  4.1    Total Bilirubin 0.5  0.3  0.9    Alkaline Phosphatase 48  57  57    AST (SGOT) 21  18  11    ALT (SGPT) 13  17  12    WBC 5.13  8.75  15.95    Hemoglobin 11.9  11.6  12.5    Hematocrit 36.2  36.4  38.3    Platelets 213  282  275    Total Cholesterol 153      Triglycerides 34      HDL Cholesterol 61      LDL Cholesterol  84            MRI Shoulder Right Without Contrast    Result Date: 11/14/2024  Impression: 1.Changes of tendinopathy are seen throughout the rotator cuff. There is a superimposed small focal full-thickness tear involving the distal attachment of the anterior fibers of the supraspinatus component. 2.No evidence of biceps tendon tear or distal retraction. 3.No evidence for labral tear. 4.Moderate osteoarthritis of the glenohumeral joint. Mild hypertrophic age-related changes of the acromioclavicular joint are noted. Electronically Signed: Isaias Lu MD  11/14/2024 12:24 PM EST  Workstation ID: [id number]        XR Shoulder 2+ View Right    Result Date: 10/7/2024  Impression: Mild degenerative changes without evidence of acute fracture or dislocation Electronically Signed: Grupo Robertson MD   10/7/2024 4:08 PM EDT  Workstation ID: [id number]        The above data has been reviewed by DENNIS Quinonez 12/12/2024 12:25 EST.          Patient Care Team:  Camilla Mena APRN as PCP - General (Nurse Practitioner)  Ade Daly APRN as Nurse Practitioner (Nurse Practitioner)            ASSESSMENT & PLAN    Diagnoses and all orders for this visit:    1. Primary hypertension (Primary)  Comments:  continue hctz 25 mg, keep bp log and follow up in 3 weeks, was well controlled prior to shoudler injury, could be related to pain    2. Hot flashes  Comments:  recommend reaching out to pain management to see if she can increase her gabapentin to 200 mg TID to help with pain and menopausal symtpoms.         Tobacco Use: High Risk (12/12/2024)    Patient History     Smoking Tobacco Use: Some Days     Smokeless Tobacco Use: Never     Passive Exposure: Current       Follow Up     Return in about 3 weeks (around 1/2/2025) for Next scheduled follow up.      Patient was given instructions and counseling regarding her condition or for health maintenance advice. Please see specific information pulled into the AVS if appropriate.   I have reviewed information obtained and documented by others and I have confirmed the accuracy of this documented note.    DENNIS Quinonez

## 2024-12-13 ENCOUNTER — TELEPHONE (OUTPATIENT)
Dept: SURGERY | Facility: CLINIC | Age: 54
End: 2024-12-13
Payer: COMMERCIAL

## 2024-12-13 NOTE — TELEPHONE ENCOUNTER
----- Message from Yair Scott sent at 11/18/2024  2:07 PM EST -----  10 year colon follow up  Patient aware     Date of most recent Colonoscopy: 11/18/2024  Date of next expected Colonoscopy: 11/18/2034  Recall has been entered into the patient's chart and Care Gap has been updated

## 2024-12-19 ENCOUNTER — TREATMENT (OUTPATIENT)
Dept: PHYSICAL THERAPY | Facility: CLINIC | Age: 54
End: 2024-12-19
Payer: COMMERCIAL

## 2024-12-19 DIAGNOSIS — M25.511 ACUTE PAIN OF RIGHT SHOULDER: ICD-10-CM

## 2024-12-19 DIAGNOSIS — M25.611 DECREASED ROM OF RIGHT SHOULDER: ICD-10-CM

## 2024-12-19 DIAGNOSIS — M75.101 TEAR OF RIGHT SUPRASPINATUS TENDON: Primary | ICD-10-CM

## 2024-12-19 NOTE — PROGRESS NOTES
OT Re-evaluation/Progress Note  Michael  OT: [address]  [place] [address]    Patient: Sigrid Morgan   : 1970  Diagnosis/ICD-10 Code:  Tear of right supraspinatus tendon [M75.101]  Referring practitioner: Kirstie Garza MD  Date of Initial Visit: Type: THERAPY  Noted: 2024  Today's Date: 2024  Patient seen for 5 sessions      Subjective:   Subjective Questionnaire: QuickDASH: 46/55  Clinical Progress: decreased active flexion and abd. Slight improvement in ER behind head.  Home Program Compliance: Yes  Treatment has included: therapeutic exercise and therapeutic activity    Subjective Evaluation    Pain  Current pain ratin  Location: R shoulder         Objective          Tenderness     Additional Tenderness Details  Moderate to severe R shoulder girdle. Severe at R proximal biceps. Severe at R supraspinatus.      Neurological Testing     Additional Neurological Details  Pt reports numbness in R ulnar nerve distribution. Pt reports tingling in all digits R and L hands.    Active Range of Motion     Right Shoulder   Flexion: 82 degrees with pain  Abduction: 37 degrees with pain  External rotation BTH: C2 with pain  Internal rotation BTB: sacrum with pain    Passive Range of Motion     Right Shoulder   Flexion: 40 degrees with pain  Abduction: 50 degrees with pain  External rotation 0°: 20 degrees with pain    Strength/Myotome Testing     Additional Strength Details  Deferred secondary to pain.      Assessment & Plan       Assessment  Impairments: abnormal or restricted ROM, activity intolerance, impaired physical strength, lacks appropriate home exercise program and pain with function   Functional limitations: carrying objects, lifting, sleeping, pulling, pushing, uncomfortable because of pain, moving in bed, reaching behind back and reaching overhead   Assessment details: Pt displays decreased active flexion and abd. Slight improvement in ER behind head. Slight increase in pain since  tuan. Pt reports she believes her injection is wearing off.   Prognosis: good    Goals  Plan Goals: SHOULDER  PROBLEMS:     1. The patient has limited ROM of the R shoulder.    LTG 1: 12 weeks:  The patient will demonstrate 140 degrees of active shoulder flexion, 140 degrees of shoulder abduction, external rotation to behind head, and internal rotation to lumbar spine to allow the patient to reach into upper kitchen cabinets and manipulate clothing behind the back with greater ease.    STATUS:  New   STG 1a: 8 weeks:  The patient will demonstrate 140 degrees of passive shoulder flexion, 140 degrees of passive shoulder abduction, 40 degrees of passive shoulder external rotation, and 40 degrees of passive shoulder internal rotation.    STATUS:  New   TREATMENT: Manual therapy, therapeutic exercise, home exercise instruction, and modalities as needed to include: electrical stimulation, ultrasound, moist heat, and ice.    2. The patient has limited strength of the R shoulder.   LTG 2: 12 weeks:  The patient will demonstrate 5 /5 strength for R shoulder flexion, abduction, external rotation, and internal rotation in order to demonstrate improved shoulder stability.    STATUS:  New   STG 2a: 8 weeks:  The patient will demonstrate ability to tolerate MMT for R shoulder flexion, abduction, external rotation, and internal rotation.    STATUS:  New   STG2b:  8 weeks:  The patient will be independent with home exercises.     STATUS:  New   TREATMENT: Manual therapy, therapeutic exercise, home exercise instruction, and modalities as needed to include: electrical stimulation, ultrasound, moist heat, and ice.     3. The patient complains of pain to the R shoulder.   LTG 3: 12 weeks:  The patient will report a pain rating of 1 /10 or better in order to improve sleep quality and tolerance to performance of activities of daily living.    STATUS:  New   STG 3a: 8 weeks:  The patient will report a pain rating of 3 /10 or better.      STATUS:  New   TREATMENT: Manual therapy, therapeutic exercise, home exercise instruction, and modalities as needed to include: electrical stimulation, ultrasound, moist heat, and ice.    4. Carrying, Moving, and Handling Objects Functional Limitation     LTG 4: 12 weeks:  The patient will demonstrate 20-39 % limitation by achieving a score of 27 on the QuickDASH.    STATUS:  New   STG 4a: 8 weeks:  The patient will demonstrate 40-59 % limitation by achieving a score of 36 on the QuickDASH.      STATUS:  New   TREATMENT:  Manual therapy, therapeutic exercise, home exercise instruction, and modalities as needed to include: moist heat, electrical stimulation, and ultrasound.     Plan  Planned modality interventions: cryotherapy and thermotherapy (hydrocollator packs)  Planned therapy interventions: body mechanics training, fine motor coordination training, flexibility, functional ROM exercises, home exercise program, joint mobilization, manual therapy, neuromuscular re-education, postural training, soft tissue mobilization, strengthening, stretching and therapeutic activities  Frequency: 2x week  Duration in weeks: 12  Treatment plan discussed with: patient      Progress toward previous goals: Partially Met      Recommendations: Continue as planned  Timeframe: 1 month  Prognosis to achieve goals: fair  Date of last progress/eval note: 11/27/24  OT SIGNATURE: Cory Mei OT   DATE TREATMENT INITIATED: Type: THERAPY  Noted: 11/27/2024  [place] License: [id number]    Based upon review of the patient's progress and continued therapy plan, it is my medical opinion that Sigrid Morgan should continue occupational therapy treatment at [facility]   [address] [address]  [phone].    Signature: __________________________________  Kirstie Garza MD MD NPI: [id number]  Timed:  Therapeutic Exercise:    11     mins  32134;      Therapeutic Activity:     12      mins  93421;       Timed Treatment:   23   mins   Total Treatment:     26   mins    Start time: 0836  End time: 0902    Please sign and return via fax to [phone]  . Thank you, [facility].

## 2024-12-24 ENCOUNTER — HOSPITAL ENCOUNTER (OUTPATIENT)
Dept: CT IMAGING | Facility: HOSPITAL | Age: 54
Discharge: HOME | End: 2024-12-24
Payer: COMMERCIAL

## 2024-12-24 DIAGNOSIS — R91.1 LUNG NODULE: ICD-10-CM

## 2024-12-24 PROCEDURE — 71250 CT THORAX DX C-: CPT

## 2024-12-30 ENCOUNTER — TELEPHONE (OUTPATIENT)
Dept: PHYSICAL THERAPY | Facility: OTHER | Age: 54
End: 2024-12-30
Payer: COMMERCIAL

## 2024-12-30 NOTE — TELEPHONE ENCOUNTER
Caller: Sigrid Morgan    Relationship: Self    What was the call regarding: PATIENT CANCELLED APPT TODAY DUE TO NOT FEELING WELL

## 2024-12-31 DIAGNOSIS — E07.9 THYROID MASS: Primary | ICD-10-CM

## 2025-01-02 ENCOUNTER — TELEPHONE (OUTPATIENT)
Dept: PHYSICAL THERAPY | Facility: OTHER | Age: 55
End: 2025-01-02
Payer: COMMERCIAL

## 2025-01-02 NOTE — TELEPHONE ENCOUNTER
Caller: Sigrid Morgan    Relationship: Self    What was the call regarding: PATIENT CANCELLED TODAYS APPT BECAUSE THEY ARE IN TOO MUCH PAIN

## 2025-01-09 ENCOUNTER — TREATMENT (OUTPATIENT)
Dept: PHYSICAL THERAPY | Facility: CLINIC | Age: 55
End: 2025-01-09
Payer: COMMERCIAL

## 2025-01-09 ENCOUNTER — OFFICE VISIT (OUTPATIENT)
Dept: FAMILY MEDICINE CLINIC | Facility: CLINIC | Age: 55
End: 2025-01-09
Payer: COMMERCIAL

## 2025-01-09 ENCOUNTER — HOSPITAL ENCOUNTER (OUTPATIENT)
Dept: ULTRASOUND IMAGING | Facility: HOSPITAL | Age: 55
Discharge: HOME | End: 2025-01-09
Payer: COMMERCIAL

## 2025-01-09 VITALS
BODY MASS INDEX: 36.02 KG/M2 | DIASTOLIC BLOOD PRESSURE: 74 MMHG | HEIGHT: 64 IN | RESPIRATION RATE: 18 BRPM | OXYGEN SATURATION: 99 % | WEIGHT: 211 LBS | SYSTOLIC BLOOD PRESSURE: 123 MMHG | HEART RATE: 54 BPM | TEMPERATURE: 98.9 F

## 2025-01-09 DIAGNOSIS — E04.1 THYROID NODULE: ICD-10-CM

## 2025-01-09 DIAGNOSIS — I10 PRIMARY HYPERTENSION: Primary | ICD-10-CM

## 2025-01-09 DIAGNOSIS — E07.9 THYROID MASS: ICD-10-CM

## 2025-01-09 DIAGNOSIS — M25.511 ACUTE PAIN OF RIGHT SHOULDER: ICD-10-CM

## 2025-01-09 DIAGNOSIS — M75.101 TEAR OF RIGHT SUPRASPINATUS TENDON: Primary | ICD-10-CM

## 2025-01-09 DIAGNOSIS — M25.611 DECREASED ROM OF RIGHT SHOULDER: ICD-10-CM

## 2025-01-09 DIAGNOSIS — E04.1 THYROID NODULE: Primary | ICD-10-CM

## 2025-01-09 PROCEDURE — 76536 US EXAM OF HEAD AND NECK: CPT

## 2025-01-09 PROCEDURE — 1125F AMNT PAIN NOTED PAIN PRSNT: CPT

## 2025-01-09 PROCEDURE — 1160F RVW MEDS BY RX/DR IN RCRD: CPT

## 2025-01-09 PROCEDURE — 99214 OFFICE O/P EST MOD 30 MIN: CPT

## 2025-01-09 PROCEDURE — 1159F MED LIST DOCD IN RCRD: CPT

## 2025-01-09 NOTE — PROGRESS NOTES
Chief Complaint  Follow-up (3 Week Follow-Up) and Hypertension (Patient reports checking her BP at home, reports nothing below 149/80)    SUBJECTIVE  Sigrid Morgan presents to [facility]    History of Present Illness  Patient is a 54-year-old female presents today for 3-week follow-up on hypertension.  Blood pressure is noted to be elevated at previous appointment.  Patient is currently on HCTZ 25 mg.  Patient is currently suffering with right shoulder injury.  Prior shoulder injury patient blood pressure was well-controlled on current medication regimen.  She has been checking her blood pressure at home for the past 3 weeks and bring the log for review today.  At home readings averaging 140s/80s.  Patient reports she just recently got a wrist blood pressure cuff has been checking once in the morning after she takes her medication and once again in the evening.  Blood pressure in office today stable at 123/74. Denies any chest pain headache blurry vision shortness of breath.  Patient reports she has started working out.     Patient had chest CT showed incidental finding of thyroid nodule.  She just had thyroid ReSound done that resulted today showing 2 nodules.  One was T RADS 4 that indicated biopsy.  Second was T RADS 3 that recommended ultrasound follow-up in 12 months , orders were placed today.  Patient's last TSH and T4 was February of last year, will place orders to assess today.  Patient noticed she has been having symptoms of trouble losing weight, dry skin, but no other symptoms.    Past Medical History:   Diagnosis Date    Acid reflux     Acute appendicitis with localized peritonitis, without perforation, abscess, or gangrene 10/21/2024    Arthritis     Breast mass 2024    Bunion     Chronic pain     Depression     Eczema     Foot pain, left     History of transfusion 2015    Hypertension     Toenail deformity       Family History   Problem Relation Age of Onset     "Arthritis Mother     Depression Mother     Hyperlipidemia Mother     Cancer Paternal Grandmother     Diabetes Brother       Past Surgical History:   Procedure Laterality Date    APPENDECTOMY N/A 10/21/2024    Procedure: APPENDECTOMY LAPAROSCOPIC;  Surgeon: Yair Scott MD;  Location: [facility] MAIN OR;  Service: General;  Laterality: N/A;     SECTION      COLONOSCOPY N/A 2024    Procedure: COLONOSCOPY;  Surgeon: Yair Scott MD;  Location: [facility] ENDOSCOPY;  Service: General;  Laterality: N/A;  diverticulitis    HYSTERECTOMY          Current Outpatient Medications:     cetirizine (zyrTEC) 10 MG tablet, Take 1 tablet by mouth Daily., Disp: 90 tablet, Rfl: 1    diclofenac (VOLTAREN) 50 MG EC tablet, TAKE 1 TABLET BY MOUTH 2 TIMES A DAY, Disp: 180 tablet, Rfl: 0    gabapentin (NEURONTIN) 100 MG capsule, Take 1 capsule by mouth 3 (Three) Times a Day., Disp: , Rfl:     hydroCHLOROthiazide 25 MG tablet, TAKE 1 TABLET BY MOUTH DAILY, Disp: 90 tablet, Rfl: 1    ibuprofen (ADVIL,MOTRIN) 600 MG tablet, Take 1 tablet by mouth Every 6 (Six) Hours As Needed for Mild Pain or Moderate Pain., Disp: 30 tablet, Rfl: 0    Ibuprofen 3 %, Gabapentin 10 %, Baclofen 2 %, lidocaine 4 %, Ketamine HCl 4 %, Apply 1-2 g topically to the appropriate area as directed 3 (Three) to 4 (Four) times daily., Disp: 90 g, Rfl: 2    triamcinolone (KENALOG) 0.5 % cream, Apply 1 Application topically to the appropriate area as directed 3 (Three) Times a Day As Needed for Irritation or Rash., Disp: , Rfl:     OBJECTIVE  Vital Signs:   /74   Pulse 54   Temp 98.9 °F (37.2 °C) (Infrared)   Resp 18   Ht 162.6 cm (64.02\")   Wt 95.7 kg (211 lb)   SpO2 99%   BMI 36.20 kg/m²    Estimated body mass index is 36.2 kg/m² as calculated from the following:    Height as of this encounter: 162.6 cm (64.02\").    Weight as of this encounter: 95.7 kg (211 lb).     Wt Readings from Last 3 Encounters:   25 95.7 kg (211 lb)   24 95.5 " kg (210 lb 9.6 oz)   11/20/24 92.5 kg (204 lb)     BP Readings from Last 3 Encounters:   01/09/25 123/74   12/12/24 149/82   11/20/24 149/98       Physical Exam  Vitals reviewed.   Constitutional:       General: She is not in acute distress.     Appearance: She is not ill-appearing.   HENT:      Head: Normocephalic and atraumatic.   Eyes:      Conjunctiva/sclera: Conjunctivae normal.   Cardiovascular:      Rate and Rhythm: Normal rate and regular rhythm.      Heart sounds: Normal heart sounds.   Pulmonary:      Effort: Pulmonary effort is normal.      Breath sounds: Normal breath sounds.   Musculoskeletal:      Cervical back: Normal range of motion.   Neurological:      Mental Status: She is alert and oriented to person, place, and time.   Psychiatric:         Mood and Affect: Mood normal.         Behavior: Behavior normal.         Thought Content: Thought content normal.         Judgment: Judgment normal.          Result Review    Common labs          2/29/2024    08:24 10/18/2024    07:34 10/21/2024    12:09   Common Labs   Glucose 92  104  107    BUN 23  19  16    Creatinine 1.02  0.90  0.95    Sodium 143  136  132    Potassium 4.1  4.3  4.1    Chloride 104  103  95    Calcium 8.8  9.3  9.1    Albumin 4.2  3.9  4.1    Total Bilirubin 0.5  0.3  0.9    Alkaline Phosphatase 48  57  57    AST (SGOT) 21  18  11    ALT (SGPT) 13  17  12    WBC 5.13  8.75  15.95    Hemoglobin 11.9  11.6  12.5    Hematocrit 36.2  36.4  38.3    Platelets 213  282  275    Total Cholesterol 153      Triglycerides 34      HDL Cholesterol 61      LDL Cholesterol  84          CT Chest Without Contrast Diagnostic    Result Date: 12/26/2024  Impression: 1. Stable 5 mm noncalcified nodule in the left lower lobe. Suggest a follow-up CT scan of the chest in 1 year. 2. Findings suggest thyroid goiter. Questionable 2 cm hypodense mass in the left thyroid lobe. Recommend a thyroid ultrasound examination for further evaluation. Electronically Signed:  Danis Saenz MD  12/26/2024 3:24 PM EST  Workstation ID: [id number]    MRI Ankle Left Without Contrast    Result Date: 11/14/2024  1.Mild changes of distal Achilles tendinopathy. There is no Achilles tendon tear. There is no retraction of torn fibers. 2.Mild changes of planter fasciitis. There is no disruption of the plantar fascia. 3.Mild to moderate changes of arthropathy are noted involving the articulations of the midfoot. The findings may relate to osteoarthritis. Given the involvement of the midfoot, findings secondary to developing neuropathic arthropathy could be considered.  Changes of underlying inflammatory neuropathy or gout arthropathy could also be considered. Electronically Signed: Isaias Lu MD  11/14/2024 12:33 PM EST  Workstation ID: [id number]    MRI Shoulder Right Without Contrast    Result Date: 11/14/2024  Impression: 1.Changes of tendinopathy are seen throughout the rotator cuff. There is a superimposed small focal full-thickness tear involving the distal attachment of the anterior fibers of the supraspinatus component. 2.No evidence of biceps tendon tear or distal retraction. 3.No evidence for labral tear. 4.Moderate osteoarthritis of the glenohumeral joint. Mild hypertrophic age-related changes of the acromioclavicular joint are noted. Electronically Signed: Isaias Lu MD  11/14/2024 12:24 PM EST  Workstation ID: [id number]    CT Abdomen Pelvis Without Contrast    Result Date: 10/21/2024  1. Uncomplicated acute appendicitis. Note pelvic location of the cecum 2. 5 mm left lower lobe nodule. If the patient is high risk, then follow-up chest CT in 12 months could be considered Electronically Signed: Leroy Lemon  10/21/2024 1:47 PM EDT  Workstation ID: [id number]    XR Shoulder 2+ View Right    Result Date: 10/7/2024  Impression: Mild degenerative changes without evidence of acute fracture or dislocation Electronically Signed: Grupo Robertson MD  10/7/2024 4:08 PM EDT  Workstation ID:  [id number]        The above data has been reviewed by DENNIS Quinonez 01/09/2025 07:42 EST.          Patient Care Team:  Camilla Mena APRN as PCP - General (Nurse Practitioner)  Ade Daly APRN as Nurse Practitioner (Nurse Practitioner)            ASSESSMENT & PLAN    Diagnoses and all orders for this visit:    1. Primary hypertension (Primary)  Comments:  Work on diet and exercise, continue HCTZ 25 mg, keep blood pressure log at home    2. Thyroid nodule  Comments:  Repeat thyroid labs, follow-up once thyroid biopsy is complete  Orders:  -     TSH+Free T4; Future  -     T3, free; Future         Tobacco Use: High Risk (1/9/2025)    Patient History     Smoking Tobacco Use: Some Days     Smokeless Tobacco Use: Never     Passive Exposure: Current       Follow Up     Return for after thyroid biopsy.      Patient was given instructions and counseling regarding her condition or for health maintenance advice. Please see specific information pulled into the AVS if appropriate.   I have reviewed information obtained and documented by others and I have confirmed the accuracy of this documented note.    DENNIS Quinonez

## 2025-01-09 NOTE — PROGRESS NOTES
Occupational Therapy Daily Treatment Note  Michael OT: [address] [place] [address]      Patient: Sigrid Morgan   : 1970  Diagnosis/ICD-10 Code:  Tear of right supraspinatus tendon [M75.101]  Referring practitioner: Kirstie Garza MD  Date of Initial Visit: Type: THERAPY  Noted: 2024  Today's Date: 2025  Patient seen for 6 sessions           Subjective   Sigrid Morgan reports: 6/10 pain R shoulder.    Objective     See Exercise, Manual, and Modality Logs for complete treatment.       Assessment/Plan  OT added lower trap rows with yellow band to treatment. Pt reports pain with standing ER (ended). Good tolerance to remainder of treatment. Pt continues with decreased fxl strength/ROM and increased pain that limits ability to complete ADLs/IADLs.  Progress per Plan of Care           Timed:  Therapeutic Exercise:    23     mins  [id number];     Therapeutic Activity:     15     mins  [id number];       Timed Treatment:   38   mins   Total Treatment:     38   mins    Start time: 0830  End time: 0908    Cory Mei OT  Occupational Therapist  [place] License: [id number]  NPI: [id number]

## 2025-01-13 ENCOUNTER — TELEPHONE (OUTPATIENT)
Dept: PHYSICAL THERAPY | Facility: CLINIC | Age: 55
End: 2025-01-13

## 2025-01-13 NOTE — TELEPHONE ENCOUNTER
Caller: Sigrid Morgan    Relationship: Self         What was the call regarding: IN ALOT OF PAIN THIS AM

## 2025-01-14 ENCOUNTER — OFFICE VISIT (OUTPATIENT)
Dept: PULMONOLOGY | Facility: CLINIC | Age: 55
End: 2025-01-14
Payer: COMMERCIAL

## 2025-01-14 ENCOUNTER — LAB (OUTPATIENT)
Dept: LAB | Facility: HOSPITAL | Age: 55
End: 2025-01-14
Payer: COMMERCIAL

## 2025-01-14 VITALS
RESPIRATION RATE: 16 BRPM | WEIGHT: 210 LBS | OXYGEN SATURATION: 98 % | TEMPERATURE: 98.1 F | HEART RATE: 54 BPM | BODY MASS INDEX: 35.85 KG/M2 | DIASTOLIC BLOOD PRESSURE: 88 MMHG | SYSTOLIC BLOOD PRESSURE: 136 MMHG | HEIGHT: 64 IN

## 2025-01-14 DIAGNOSIS — R91.1 LUNG NODULE: Primary | ICD-10-CM

## 2025-01-14 DIAGNOSIS — J41.1 BRONCHITIS, MUCOPURULENT RECURRENT: ICD-10-CM

## 2025-01-14 DIAGNOSIS — E04.1 THYROID NODULE: ICD-10-CM

## 2025-01-14 DIAGNOSIS — R91.1 LUNG NODULE: ICD-10-CM

## 2025-01-14 LAB — T3FREE SERPL-MCNC: 3.36 PG/ML (ref 2–4.4)

## 2025-01-14 PROCEDURE — 84439 ASSAY OF FREE THYROXINE: CPT

## 2025-01-14 PROCEDURE — 1159F MED LIST DOCD IN RCRD: CPT | Performed by: INTERNAL MEDICINE

## 2025-01-14 PROCEDURE — 84443 ASSAY THYROID STIM HORMONE: CPT

## 2025-01-14 PROCEDURE — 36415 COLL VENOUS BLD VENIPUNCTURE: CPT

## 2025-01-14 PROCEDURE — 1160F RVW MEDS BY RX/DR IN RCRD: CPT | Performed by: INTERNAL MEDICINE

## 2025-01-14 PROCEDURE — 84481 FREE ASSAY (FT-3): CPT

## 2025-01-14 PROCEDURE — 99204 OFFICE O/P NEW MOD 45 MIN: CPT | Performed by: INTERNAL MEDICINE

## 2025-01-14 PROCEDURE — 82785 ASSAY OF IGE: CPT

## 2025-01-14 RX ORDER — ALBUTEROL SULFATE 90 UG/1
2 INHALANT RESPIRATORY (INHALATION) EVERY 4 HOURS PRN
Qty: 18 G | Refills: 5 | Status: SHIPPED | OUTPATIENT
Start: 2025-01-14

## 2025-01-14 NOTE — PROGRESS NOTES
CHIEF COMPLAINT    Primary Care Provider  Camilla Mena APRN     Referring Provider  DENNIS Quinonez    Patient Complaint  Establish Care (New patient, Lung nodule/CT 12/04), Lung Nodule, and Shortness of Breath (With exertion)        Subjective          Sigrid Morgan presents to [facility]  History of Present Illness  Sigrid Morgan is a 54 y.o. female.  She was referred to me by DENNIS Quinonez.    History of Present Illness  The patient is a 54-year-old female who presents for evaluation of a lung nodule. She is seen by Camilla for primary care. She recently had a CT scan of the chest without contrast for a 5 mm lung nodule, which showed a stable lung nodule as well as a thyroid goiter. She is referred to us for the lung nodule.    She has a history of smoking Black & Mild cigars until the previous year but does not smoke cigarettes. She reports no family history of lung disease or cancer but mentions a history of throat cancer in her paternal relatives. She has undergone pulmonary function tests in the past, which led to the prescription of a nebulizer, although she has not used this since relocating. She experiences shortness of breath during long walks and when ascending or descending stairs. She does not have any inhalers at home.    She has a history of recurrent pneumonia, with approximately 5 episodes throughout her life, including one instance of walking pneumonia. She also has a history of bronchitis but has never contracted influenza. Her last significant respiratory episode occurred in 2019. She does not have a history of asthma but has always had a nebulizer on hand. She recalls experiencing difficulty breathing during her youth, which was managed with a nebulizer. She has not received the influenza vaccine and has only received the pneumonia vaccine a few times.    Her sleep quality has been poor, characterized by frequent tossing and  turning, and she has been diagnosed with sleep apnea. She uses a CPAP machine for management.    Supplemental Information  She has a lot of spine issues and uses marijuana as a pain medication. She is with the pain clinic and they had her on gabapentin. She is getting an injection next month in the spine. She has a rotator cuff tear. She was working at the [facility] acute hospital at the [employer] as a  but tore her rotator cuff and is done with that.    SOCIAL HISTORY  The patient smoked Black & Mild cigars until last year and uses marijuana for pain management. She has a history of cocaine use for approximately 5 years but has not used it since moving from [place]. She has 3 children.    FAMILY HISTORY  The patient reports no family history of lung disease or lung cancer. However, she mentions a history of throat cancer and other cancers on her father's side, affecting different uncles.    MEDICATIONS  Current: gabapentin    IMMUNIZATIONS  She has not received the influenza vaccine and has only received the pneumonia vaccine a few times.    Tobacco Use: Medium Risk (1/14/2025)    Patient History     Smoking Tobacco Use: Former     Smokeless Tobacco Use: Never     Passive Exposure: Past   .    Review of Systems     General:  No Fatigue, No Fever No weight loss or loss of appetite  HEENT: No dysphagia, No Visual Changes, no rhinorrhea  Respiratory:  + cough,+Dyspnea, intermittent phlegm, No Pleuritic Pain, no wheezing, no hemoptysis.  Cardiovascular: Denies chest pain, denies palpitations,+BANEGAS, No Chest Pressure  Gastrointestinal:  No Abdominal Pain, No Nausea, No Vomiting, No Diarrhea  Genitourinary:  No Dysuria, No Frequency, No Hesitancy  Musculoskeletal: No muscle pain or swelling  Endocrine:  No Heat Intolerance, No Cold Intolerance  Hematologic:  No Bleeding, No Bruising  Psychiatric:  No Anxiety, No Depression  Neurologic:  No Confusion, no Dysarthria, No Headaches  Skin:  No Rash, No Open  "Wounds    Family History   Problem Relation Age of Onset    Arthritis Mother     Depression Mother     Hyperlipidemia Mother     Cancer Paternal Grandmother     Diabetes Brother         Social History     Socioeconomic History    Marital status: Single   Tobacco Use    Smoking status: Former     Current packs/day: 0.00     Average packs/day: 1.5 packs/day for 15.0 years (22.5 ttl pk-yrs)     Types: Cigars, Cigarettes     Start date: 2013     Quit date: 10/21/2024     Years since quittin.2     Passive exposure: Past    Smokeless tobacco: Never   Vaping Use    Vaping status: Never Used   Substance and Sexual Activity    Alcohol use: Not Currently    Drug use: Yes     Frequency: 3.0 times per week     Types: \"Crack\" cocaine, Marijuana     Comment: no longer uses cocaine    Sexual activity: Not Currently     Partners: Male     Birth control/protection: None        Past Medical History:   Diagnosis Date    Acid reflux     Acute appendicitis with localized peritonitis, without perforation, abscess, or gangrene 10/21/2024    Arthritis     Breast mass     Bunion     Chronic pain     Depression     Eczema     Foot pain, left     History of transfusion     Hypertension     Pneumonia     Toenail deformity         Immunization History   Administered Date(s) Administered    Tdap 2018         Allergies   Allergen Reactions    Iodine Rash    Latex Rash          Current Outpatient Medications:     cetirizine (zyrTEC) 10 MG tablet, Take 1 tablet by mouth Daily., Disp: 90 tablet, Rfl: 1    diclofenac (VOLTAREN) 50 MG EC tablet, TAKE 1 TABLET BY MOUTH 2 TIMES A DAY, Disp: 180 tablet, Rfl: 0    gabapentin (NEURONTIN) 100 MG capsule, Take 1 capsule by mouth 3 (Three) Times a Day., Disp: , Rfl:     hydroCHLOROthiazide 25 MG tablet, TAKE 1 TABLET BY MOUTH DAILY, Disp: 90 tablet, Rfl: 1    ibuprofen (ADVIL,MOTRIN) 600 MG tablet, Take 1 tablet by mouth Every 6 (Six) Hours As Needed for Mild Pain or Moderate Pain., " "Disp: 30 tablet, Rfl: 0    Ibuprofen 3 %, Gabapentin 10 %, Baclofen 2 %, lidocaine 4 %, Ketamine HCl 4 %, Apply 1-2 g topically to the appropriate area as directed 3 (Three) to 4 (Four) times daily., Disp: 90 g, Rfl: 2    triamcinolone (KENALOG) 0.5 % cream, Apply 1 Application topically to the appropriate area as directed 3 (Three) Times a Day As Needed for Irritation or Rash., Disp: , Rfl:     albuterol sulfate  (90 Base) MCG/ACT inhaler, Inhale 2 puffs Every 4 (Four) Hours As Needed for Wheezing., Disp: 18 g, Rfl: 5     Objective     Physical Exam    Physical Exam  Lungs were auscultated.    Vital Signs Reviewed  /88 (BP Location: Left arm, Patient Position: Sitting, Cuff Size: Adult)   Pulse 54   Temp 98.1 °F (36.7 °C) (Oral)   Resp 16   Ht 162.6 cm (64\")   Wt 95.3 kg (210 lb)   SpO2 98% Comment: room air  BMI 36.05 kg/m²   WDWN, Alert, pleasant, in no acute distress.   HEENT:  PERRL, EOMI.  OP, nares clear, no sinus tenderness  Mallampatti classification : 1  Neck:  Supple, no JVD, no thyromegaly  Lymph: no axillary, cervical, supraclavicular lymphadenopathy noted bilaterally  Chest:  good aeration, bilateral diminished breath sounds, no wheezing, crackles or rhonchi, resonant to percussion b/l  CV: RRR, no MGR, pulses 2+, equal.  Abd:  Soft, NT, ND, + BS, no HSM  EXT:  no clubbing, no cyanosis, No BLE edema  Neuro:  A&Ox3, CN grossly intact, no focal deficits.  Skin: No rashes or lesions noted     Result Review :   The following data was reviewed by: Gordo Campbell MD on 01/14/2025:  Common labs          2/29/2024    08:24 10/18/2024    07:34 10/21/2024    12:09   Common Labs   Glucose 92  104  107    BUN 23  19  16    Creatinine 1.02  0.90  0.95    Sodium 143  136  132    Potassium 4.1  4.3  4.1    Chloride 104  103  95    Calcium 8.8  9.3  9.1    Albumin 4.2  3.9  4.1    Total Bilirubin 0.5  0.3  0.9    Alkaline Phosphatase 48  57  57    AST (SGOT) 21  18  11    ALT (SGPT) 13  17  12  "   WBC 5.13  8.75  15.95    Hemoglobin 11.9  11.6  12.5    Hematocrit 36.2  36.4  38.3    Platelets 213  282  275    Total Cholesterol 153      Triglycerides 34      HDL Cholesterol 61      LDL Cholesterol  84        CMP          2/29/2024    08:24 10/18/2024    07:34 10/21/2024    12:09   CMP   Glucose 92  104  107    BUN 23  19  16    Creatinine 1.02  0.90  0.95    EGFR 65.5  76.1  71.3    Sodium 143  136  132    Potassium 4.1  4.3  4.1    Chloride 104  103  95    Calcium 8.8  9.3  9.1    Total Protein 6.4  7.5  7.7    Albumin 4.2  3.9  4.1    Globulin 2.2  3.6  3.6    Total Bilirubin 0.5  0.3  0.9    Alkaline Phosphatase 48  57  57    AST (SGOT) 21  18  11    ALT (SGPT) 13  17  12    Albumin/Globulin Ratio 1.9  1.1  1.1    BUN/Creatinine Ratio 22.5  21.1  16.8    Anion Gap 16.0  10.2  13.4      CBC          2/29/2024    08:24 10/18/2024    07:34 10/21/2024    12:09   CBC   WBC 5.13  8.75  15.95    RBC 3.93  3.92  4.09    Hemoglobin 11.9  11.6  12.5    Hematocrit 36.2  36.4  38.3    MCV 92.1  92.9  93.6    MCH 30.3  29.6  30.6    MCHC 32.9  31.9  32.6    RDW 12.7  13.1  14.6    Platelets 213  282  275      CBC w/diff          2/29/2024    08:24 10/18/2024    07:34 10/21/2024    12:09   CBC w/Diff   WBC 5.13  8.75  15.95    RBC 3.93  3.92  4.09    Hemoglobin 11.9  11.6  12.5    Hematocrit 36.2  36.4  38.3    MCV 92.1  92.9  93.6    MCH 30.3  29.6  30.6    MCHC 32.9  31.9  32.6    RDW 12.7  13.1  14.6    Platelets 213  282  275    Neutrophil Rel % 41.4  70.6  77.2    Immature Granulocyte Rel % 0.2  0.3  0.3    Lymphocyte Rel % 40.4  21.4  15.3    Monocyte Rel % 8.4  5.9  6.0    Eosinophil Rel % 8.8  1.3  0.8    Basophil Rel % 0.8  0.5  0.4      Data reviewed : Radiologic studies CT chest from dec 2024 reviewed.         Results  Imaging  CT scan of the chest without contrast showed a stable 5 mm lung nodule and thyroid goiter.      Narrative & Impression   CT CHEST WO CONTRAST DIAGNOSTIC     Date of Exam: 12/24/2024  2:20 PM EST     Indication: 5 mm lung nodule, 12 month follow up.     Comparison: CT scan of the abdomen 10/21/2024     Technique: Axial CT images were obtained of the chest without contrast administration.  Reconstructed coronal and sagittal images were also obtained. Automated exposure control and iterative construction methods were used.        Findings:  There is a mildly enlarged heterogeneous thyroid gland with substernal extension left greater than right. Questionable 2 cm hypodense mass in the left thyroid lobe. There is no mediastinal, axillary or hilar adenopathy. No evidence of coronary   calcification. The thoracic aorta has a normal caliber. There are no pleural or pericardial effusions. There is a cyst in the visualized right kidney. Stable 5 mm noncalcified nodule laterally in the left lower lobe. There is a calcified granuloma in the   right lower lobe. Degenerative changes are present in the thoracic spine.     IMPRESSION:  Impression:     1. Stable 5 mm noncalcified nodule in the left lower lobe. Suggest a follow-up CT scan of the chest in 1 year.     2. Findings suggest thyroid goiter. Questionable 2 cm hypodense mass in the left thyroid lobe. Recommend a thyroid ultrasound examination for further evaluation.           Electronically Signed: Danis Saenz MD    12/26/2024 3:24 PM EST    Workstation ID: [id number]        Assessment and Plan    Diagnoses and all orders for this visit:    1. Lung nodule (Primary)  -     Complete PFT - Pre & Post Bronchodilator; Future  -     6 Minute Walk Test; Future  -     IgE Level; Future  -     albuterol sulfate  (90 Base) MCG/ACT inhaler; Inhale 2 puffs Every 4 (Four) Hours As Needed for Wheezing.  Dispense: 18 g; Refill: 5    2. Bronchitis, mucopurulent recurrent  -     Complete PFT - Pre & Post Bronchodilator; Future  -     6 Minute Walk Test; Future  -     IgE Level; Future  -     albuterol sulfate  (90 Base) MCG/ACT inhaler; Inhale 2 puffs Every  4 (Four) Hours As Needed for Wheezing.  Dispense: 18 g; Refill: 5      Assessment & Plan  1. Lung nodule.  The patient has a 5 mm lung nodule, which is considered small. The likelihood of malignancy is low for nodules less than 8 mm. However, due to her history of recurrent bronchitis and exposure to smoke, she is at an increased risk for bronchitis and pneumonia. A pulmonary function test and a 6-minute walk test will be conducted. A blood test will also be ordered to assess her allergy profile. An annual CT scan will be scheduled for December 2025. An inhaler will be prescribed for use as needed. She is advised to maintain an active lifestyle to promote lung health. In the event of a severe bronchitis episode, she should contact the office immediately.    2. Recurrent bronchitis.  Given her history of recurrent bronchitis, she is more prone to lung inflammation. An inhaler will be prescribed for use as needed. She is advised to maintain an active lifestyle to promote lung health. In the event of a severe bronchitis episode, she should contact the office immediately.    3. Sleep apnea.  She has been using a CPAP machine for sleep apnea. No changes to her current treatment plan are necessary.    Reluctant and Refuses to take flu shot, pneumonia shot.     Follow-up  The patient will follow up in 4 to 6 months.    Follow Up   Return in about 6 months (around 7/14/2025).  Patient was given instructions and counseling regarding her condition or for health maintenance advice. Please see specific information pulled into the AVS if appropriate.     Patient or patient representative verbalized consent for the use of Ambient Listening during the visit with  Gordo Campbell MD for chart documentation. 1/14/2025  08:45 EST     Electronically signed by Gordo Campbell MD, 1/14/2025, 09:03 EST.

## 2025-01-15 LAB
T4 FREE SERPL-MCNC: 1.52 NG/DL (ref 0.92–1.68)
TSH SERPL DL<=0.05 MIU/L-ACNC: 0.56 UIU/ML (ref 0.27–4.2)

## 2025-01-16 ENCOUNTER — TREATMENT (OUTPATIENT)
Dept: PHYSICAL THERAPY | Facility: CLINIC | Age: 55
End: 2025-01-16
Payer: COMMERCIAL

## 2025-01-16 DIAGNOSIS — M75.101 TEAR OF RIGHT SUPRASPINATUS TENDON: Primary | ICD-10-CM

## 2025-01-16 DIAGNOSIS — M25.611 DECREASED ROM OF RIGHT SHOULDER: ICD-10-CM

## 2025-01-16 DIAGNOSIS — M25.511 ACUTE PAIN OF RIGHT SHOULDER: ICD-10-CM

## 2025-01-16 NOTE — PROGRESS NOTES
OT Re-evaluation/Progress Note  Michael  OT: [address]  [place] [address]    Patient: Sigrid Morgan   : 1970  Diagnosis/ICD-10 Code:  Tear of right supraspinatus tendon [M75.101]  Referring practitioner: Kirstie Garza MD  Date of Initial Visit: Type: THERAPY  Noted: 2024  Today's Date: 2025  Patient seen for 7 sessions      Subjective:   Subjective Questionnaire: QuickDASH: 48/55  Clinical Progress: improved since last re-eval. Unchanged since eval.  Home Program Compliance: Yes  Treatment has included: therapeutic exercise and therapeutic activity    Subjective Evaluation    Pain  Current pain ratin  Location: R shoulder         Objective          Tenderness     Additional Tenderness Details  Moderate to severe R shoulder girdle. Severe at R proximal biceps. Severe at R supraspinatus.      Neurological Testing     Additional Neurological Details  Pt reports numbness in R ulnar nerve distribution. Pt reports tingling in all digits R and L hands.    Active Range of Motion     Right Shoulder   Flexion: 98 degrees with pain  Abduction: 65 degrees with pain  External rotation BTH: C2 with pain  Internal rotation BTB: sacrum with pain    Passive Range of Motion     Right Shoulder   Flexion: 60 degrees with pain  Abduction: 35 degrees with pain  External rotation 0°: 20 degrees with pain    Strength/Myotome Testing     Additional Strength Details  Deferred secondary to pain.      Assessment & Plan       Assessment  Impairments: abnormal or restricted ROM, activity intolerance, impaired physical strength, lacks appropriate home exercise program and pain with function   Functional limitations: carrying objects, lifting, sleeping, pulling, pushing, uncomfortable because of pain, moving in bed, reaching behind back and reaching overhead   Assessment details: Pt displays improved active flexion and abd since re-eval but is back to measurements taken at eval. Pain remains high and pt reports no  fxl gain as shown in QuickDASH score. OT recommends discharge at this time and follow up with MD. Pt met 1/5 STGs and 0/4 LTGs.  Prognosis: good    Goals  Plan Goals: SHOULDER  PROBLEMS:     1. The patient has limited ROM of the R shoulder.    LTG 1: 12 weeks:  The patient will demonstrate 140 degrees of active shoulder flexion, 140 degrees of shoulder abduction, external rotation to behind head, and internal rotation to lumbar spine to allow the patient to reach into upper kitchen cabinets and manipulate clothing behind the back with greater ease.    STATUS:  Not met   STG 1a: 8 weeks:  The patient will demonstrate 140 degrees of passive shoulder flexion, 140 degrees of passive shoulder abduction, 40 degrees of passive shoulder external rotation, and 40 degrees of passive shoulder internal rotation.    STATUS:  Not met   TREATMENT: Manual therapy, therapeutic exercise, home exercise instruction, and modalities as needed to include: electrical stimulation, ultrasound, moist heat, and ice.    2. The patient has limited strength of the R shoulder.   LTG 2: 12 weeks:  The patient will demonstrate 5 /5 strength for R shoulder flexion, abduction, external rotation, and internal rotation in order to demonstrate improved shoulder stability.    STATUS:  Not met   STG 2a: 8 weeks:  The patient will demonstrate ability to tolerate MMT for R shoulder flexion, abduction, external rotation, and internal rotation.    STATUS:  Not met   STG2b:  8 weeks:  The patient will be independent with home exercises.     STATUS:  Met   TREATMENT: Manual therapy, therapeutic exercise, home exercise instruction, and modalities as needed to include: electrical stimulation, ultrasound, moist heat, and ice.     3. The patient complains of pain to the R shoulder.   LTG 3: 12 weeks:  The patient will report a pain rating of 1 /10 or better in order to improve sleep quality and tolerance to performance of activities of daily living.    STATUS:  Not  met   STG 3a: 8 weeks:  The patient will report a pain rating of 3 /10 or better.     STATUS:  Not met   TREATMENT: Manual therapy, therapeutic exercise, home exercise instruction, and modalities as needed to include: electrical stimulation, ultrasound, moist heat, and ice.    4. Carrying, Moving, and Handling Objects Functional Limitation     LTG 4: 12 weeks:  The patient will demonstrate 20-39 % limitation by achieving a score of 27 on the QuickDASH.    STATUS:  Not met   STG 4a: 8 weeks:  The patient will demonstrate 40-59 % limitation by achieving a score of 36 on the QuickDASH.      STATUS:  Not met   TREATMENT:  Manual therapy, therapeutic exercise, home exercise instruction, and modalities as needed to include: moist heat, electrical stimulation, and ultrasound.     Plan  Planned modality interventions: cryotherapy and thermotherapy (hydrocollator packs)  Planned therapy interventions: body mechanics training, fine motor coordination training, flexibility, functional ROM exercises, home exercise program, joint mobilization, manual therapy, neuromuscular re-education, postural training, soft tissue mobilization, strengthening, stretching and therapeutic activities  Frequency: 2x week  Duration in weeks: 12  Treatment plan discussed with: patient      Progress toward previous goals: Partially Met      Recommendations: Discharge  Date of last progress/eval note: 12/19/24  OT SIGNATURE: Cory Mei OT   DATE TREATMENT INITIATED: Type: THERAPY  Noted: 11/27/2024  [place] License: [id number]      Signature: __________________________________  Kirstie Garza MD MD NPI: [id number]  Timed:  Therapeutic Exercise:    10     mins  [id number];     Therapeutic Activity:     14     mins  [id number];       Timed Treatment:   24   mins   Total Treatment:     30   mins    Start time: 0830  End time: 0900    Please sign and return via fax to [phone]  . Thank you, [facility].

## 2025-01-16 NOTE — LETTER
Michael  OT: [address] [facility]  [place] [address]    Patient: Sigrid Morgan   : 1970  Diagnosis/ICD-10 Code:  Tear of right supraspinatus tendon [M75.101]  Referring practitioner: Kirstie Garza MD  Date of Initial Visit: Type: THERAPY  Noted: 2024  Today's Date: 2025  Patient seen for 7 sessions    ROM is mostly unchanged since eval. Pt remains constantly high and functional score on QuickDASH has not improved. OT recommends discharge at this time due to lack of progress.    Tenderness     Additional Tenderness Details  Moderate to severe R shoulder girdle. Severe at R proximal biceps. Severe at R supraspinatus.      Neurological Testing     Additional Neurological Details  Pt reports numbness in R ulnar nerve distribution. Pt reports tingling in all digits R and L hands.    Active Range of Motion     Right Shoulder   Flexion: 98 degrees with pain  Abduction: 65 degrees with pain  External rotation BTH: C2 with pain  Internal rotation BTB: sacrum with pain    Passive Range of Motion     Right Shoulder   Flexion: 60 degrees with pain  Abduction: 35 degrees with pain  External rotation 0°: 20 degrees with pain    Strength/Myotome Testing     Additional Strength Details  Deferred secondary to pain.

## 2025-01-17 ENCOUNTER — OFFICE VISIT (OUTPATIENT)
Dept: SLEEP MEDICINE | Facility: HOSPITAL | Age: 55
End: 2025-01-17
Payer: COMMERCIAL

## 2025-01-17 ENCOUNTER — HOSPITAL ENCOUNTER (OUTPATIENT)
Dept: ULTRASOUND IMAGING | Facility: HOSPITAL | Age: 55
Discharge: HOME | End: 2025-01-17
Payer: COMMERCIAL

## 2025-01-17 VITALS
HEIGHT: 64 IN | SYSTOLIC BLOOD PRESSURE: 139 MMHG | DIASTOLIC BLOOD PRESSURE: 77 MMHG | WEIGHT: 206 LBS | HEART RATE: 49 BPM | OXYGEN SATURATION: 98 % | BODY MASS INDEX: 35.17 KG/M2

## 2025-01-17 DIAGNOSIS — G47.33 OBSTRUCTIVE SLEEP APNEA, ADULT: Primary | ICD-10-CM

## 2025-01-17 DIAGNOSIS — R00.1 BRADYCARDIA: ICD-10-CM

## 2025-01-17 DIAGNOSIS — E66.811 CLASS 1 OBESITY WITH SERIOUS COMORBIDITY AND BODY MASS INDEX (BMI) OF 34.0 TO 34.9 IN ADULT, UNSPECIFIED OBESITY TYPE: ICD-10-CM

## 2025-01-17 DIAGNOSIS — I10 ESSENTIAL HYPERTENSION: ICD-10-CM

## 2025-01-17 DIAGNOSIS — E04.1 THYROID NODULE: ICD-10-CM

## 2025-01-17 PROCEDURE — G0463 HOSPITAL OUTPT CLINIC VISIT: HCPCS

## 2025-01-17 PROCEDURE — 76536 US EXAM OF HEAD AND NECK: CPT

## 2025-01-17 NOTE — PROGRESS NOTES
[address] [place] [id number]  Phone: [phone]  Fax: [phone]      SLEEP CLINIC FOLLOW UP PROGRESS NOTE.    Sigrid Morgan  [id number]   1970  54 y.o.  female      PCP: Camilla Mena APRN      Date of visit: 1/17/2025    Chief Complaint   Patient presents with    Sleep Apnea       Medications and allergies are reviewed by me and documented in the encounter.     SOCIAL (habits pertaining to sleep medicine)  History tobacco use:No  History of alcohol use: 0 per week  Caffeine use: 1 beverages/d    HPI:  This is a 54 y.o. PMHx HTN. Here for management of obstructive sleep apnea (KIMI 5.3/hr on 5/13/2024 HST). Patient is using positive airway pressure therapy and the symptoms of sleep apnea have improved significantly on the therapy. Normally patient goes to bed at 930 PM and wakes up at 6 AM .  The patient wakes up 2 time(s) during the night and has no problem going back to sleep.  Feels refreshed after waking up.     Overall patient's Impression of their PAP therapy is: Not well  Never got airfit f40 FFM    No air pressure issues  Feels benefit from PAP when able to tolerate motivated to continue    Compliance data as reviewed by me with patient room today:  Date range 12/18/2024 - 1/16/2025  Overall use 80% (prior visit 93%)  4-hour unique 30% (prior visit 67%) - poor compliance due to mask issue  Average days used 3 hours 15 minutes  Device AirSense 11 AutoSet  Settings 8-20 cm H2O  EPR 2 full-time  95th percentile pressure 12.8 cm H2O  Maximum pressures 14.0 cm H2O  95th percentile leak is 15.4 LPM  AHI 0.3  DME: [facility]  Current mask: Nasal pillow - discomfort   Prior mask use:  - Robina FFM-uncomfortable      -BP in sleep clinic  139/77  States she feels well today  On hydrochlorothiazide    -Bradycardic HR on MA vital sigsn 49 bpm / Has a thyroid work up in process with her PCP   Asymptomatic  Not on betablocker  No chest-pain/no dyspnea/no near-syncope/no syncope/no  "lightheadedness        -Last seen in sleep clinic by me ~4 months ago on 8/23/2024 suboptimal compliance overall/4-hour unique 93% / 67% AHI 0.9 DME [facility] [facility] mask Robina FFM motivated to continue with PAP.  Robina was uncomfortable.  Ordered AirFit F40 FFM counseled compliance.    REVIEW OF SYSTEMS:   Is negative unless otherwise noted in HPI  [place] Sleepiness Scale :Total score: 21 - poor PAP compliance    Disclaimer History: The above history is based on this sleep physician's in room encounter with the patient. Pre encounter self administered questionnaires are taken into consideration and discussed with patient for any discordance. The above documentation by this sleep physician is the most accurate clinical information determined by in room sleep physician encounter with patient.     PHYSICAL EXAMINATION:  Vitals:    01/17/25 0900   BP: 139/77   Pulse: (!) 49   SpO2: 98%   Weight: 93.4 kg (206 lb)   Height: 163.8 cm (64.49\")    Body mass index is 34.83 kg/m².   CONSTITUTIONAL: Well appearing, in no overt pain or respiratory distress   ENT: Mallampati IV, macroglossia  RESP SYSTEM:  Breath sounds are clear bilateral (no rales/no rhonchi/no wheezes), No overt respiratory distress, speaks in clear sentences without dyspnea, no accessory muscle use  CARDIOVASULAR: Rhythm is regular, she is bradycardic ~54 bpm on exam, No edema noted  NEURO: Oriented x 3, no gross focal deficits         ASSESSMENT AND PLAN:  Obstructive sleep apnea ( G 47.33).    -Specific Changes made by me today:  I. After review of compliance data, in visit clinical correlation, and through shared decision making: will not make any changes to PAP therapy settings.  II. Order placed for mask fitting with AirFit F40 Full face mask - make sure she gets this mask next physician order  III. Counseled patient PAP therapy compliance from overall health perspective and in order to avoid any potential barriers placed by insurance/DME regarding use " requirements that may effect PAP therapy/supplies coverage with non-compliance claims by either DME/Insurance.   IV. Counseled patient to follow-up with me in 4 months for therapy review.  Counseled may request sooner follow-up to sleep clinic anytime the patient feels necessary.  The symptoms of sleep apnea have improved with the device and the treatment.  Patient's compliance with the device is poor for treatment of sleep apnea.  I have independently reviewed the smart card down load and discussed with the patient the download data and encouarged the patient to continue to use the device.The residual AHI is acceptable. The device is benefiting the patient and the device is medically necessary.  Without proper control of sleep apnea and good compliance there is a increased risk for hypertension, diabetes mellitus and nonrestorative sleep with hypersomnia which can increase risk for motor vehicle accidents.  Untreated sleep apnea is also a risk factor for development of atrial fibrillation, pulmonary hypertension, insulin resistance and stroke. The patient is also instructed to get the supplies from the [facility] and and change them on a regular basis.  A prescription for supplies has been sent to the [facility]. Counseled no driving or operating heavy machinery while sleepy. I have also discussed the good sleep hygiene habits and adequate amount of sleep needed for good health.  Obesity, with BMI is Body mass index is 34.83 kg/m².. Counseled weight loss will be beneficial for reduction in severity of sleep apnea, healthy diet/exercise to achieve same, follow up with primary care physician for serial monitoring and to further guide management.  Essential hypertension [I10], Compliant wit home therapies reviewed.  Counseled patient PAP therapy compliance for treatment of obstructive sleep apnea may be beneficial for this comorbid condition.  Follow-up with PCP as previous for hypertension  Bradycardia,  asymptomatic in sleep clinic. Counseled after this visit request follow up with PCP for serial monitoring/evaluation/further direction. Follow up with her PCP as previously planned for thyroid workup.    Follow up in 4 months . Patient's questions were answered.        EMR Dragon/Transcription disclaimer:   Much of this encounter note is an electronic transcription/translation of spoken language to printed text. The electronic translation of spoken language may permit erroneous, or at times, nonsensical words or phrases to be inadvertently transcribed; Although I have reviewed the note for such errors, some may still exist.       NPI #: [id number]    Gustavo Rodriguez DO  Sleep Medicine  [facility]  01/17/25

## 2025-01-18 LAB — IGE SERPL-ACNC: 74 IU/ML (ref 6–495)

## 2025-01-29 ENCOUNTER — HOSPITAL ENCOUNTER (EMERGENCY)
Facility: HOSPITAL | Age: 55
Discharge: HOME | End: 2025-01-29
Attending: EMERGENCY MEDICINE | Admitting: EMERGENCY MEDICINE
Payer: COMMERCIAL

## 2025-01-29 ENCOUNTER — APPOINTMENT (OUTPATIENT)
Dept: GENERAL RADIOLOGY | Facility: HOSPITAL | Age: 55
End: 2025-01-29
Payer: COMMERCIAL

## 2025-01-29 VITALS
HEART RATE: 60 BPM | DIASTOLIC BLOOD PRESSURE: 63 MMHG | BODY MASS INDEX: 35.45 KG/M2 | SYSTOLIC BLOOD PRESSURE: 127 MMHG | WEIGHT: 212.74 LBS | HEIGHT: 65 IN | OXYGEN SATURATION: 100 % | RESPIRATION RATE: 18 BRPM | TEMPERATURE: 97.6 F

## 2025-01-29 DIAGNOSIS — S86.912A KNEE STRAIN, LEFT, INITIAL ENCOUNTER: ICD-10-CM

## 2025-01-29 DIAGNOSIS — M16.12 OSTEOARTHRITIS OF LEFT HIP, UNSPECIFIED OSTEOARTHRITIS TYPE: ICD-10-CM

## 2025-01-29 DIAGNOSIS — S76.012A HIP STRAIN, LEFT, INITIAL ENCOUNTER: Primary | ICD-10-CM

## 2025-01-29 DIAGNOSIS — M25.552 LEFT HIP PAIN: ICD-10-CM

## 2025-01-29 PROCEDURE — 25010000002 DEXAMETHASONE SODIUM PHOSPHATE 10 MG/ML SOLUTION

## 2025-01-29 PROCEDURE — 97110 THERAPEUTIC EXERCISES: CPT | Performed by: PHYSICAL THERAPIST

## 2025-01-29 PROCEDURE — 73562 X-RAY EXAM OF KNEE 3: CPT

## 2025-01-29 PROCEDURE — 96372 THER/PROPH/DIAG INJ SC/IM: CPT

## 2025-01-29 PROCEDURE — 97161 PT EVAL LOW COMPLEX 20 MIN: CPT | Performed by: PHYSICAL THERAPIST

## 2025-01-29 PROCEDURE — 99283 EMERGENCY DEPT VISIT LOW MDM: CPT

## 2025-01-29 PROCEDURE — 73502 X-RAY EXAM HIP UNI 2-3 VIEWS: CPT

## 2025-01-29 RX ORDER — METHOCARBAMOL 750 MG/1
750 TABLET, FILM COATED ORAL 3 TIMES DAILY PRN
Qty: 15 TABLET | Refills: 0 | Status: SHIPPED | OUTPATIENT
Start: 2025-01-29 | End: 2025-02-03

## 2025-01-29 RX ORDER — METHOCARBAMOL 750 MG/1
750 TABLET, FILM COATED ORAL ONCE
Status: COMPLETED | OUTPATIENT
Start: 2025-01-29 | End: 2025-01-29

## 2025-01-29 RX ORDER — DEXAMETHASONE SODIUM PHOSPHATE 10 MG/ML
10 INJECTION, SOLUTION INTRAMUSCULAR; INTRAVENOUS ONCE
Status: COMPLETED | OUTPATIENT
Start: 2025-01-29 | End: 2025-01-29

## 2025-01-29 RX ADMIN — METHOCARBAMOL 750 MG: 750 TABLET ORAL at 15:52

## 2025-01-29 RX ADMIN — DEXAMETHASONE SODIUM PHOSPHATE 10 MG: 10 INJECTION, SOLUTION INTRAMUSCULAR; INTRAVENOUS at 15:52

## 2025-01-29 NOTE — ED PROVIDER NOTES
Time: 2:52 PM EST  Date of encounter:  2025  Independent Historian/Clinical History and Information was obtained by:   Patient    History is limited by: N/A    Chief Complaint   Patient presents with    Hip Pain     Pt arrives to ED for left hip pain since last Tuesday. Pt states she went to a new workout class and the next day she has the pain. Pt states she has taken meds at home with no relief          History of Present Illness:  Patient is a 55 y.o. year old female who presents to the emergency department for evaluation of hip pain.  Patient presents to the emergency department today for left hip and knee pain that is ongoing for approximately 1 week.  She states that she recently did a spin class and believes that she may have injured herself doing that.  Patient states she is able to ambulate.    Patient Care Team  Primary Care Provider: Camilla Mena APRN    Past Medical History:     Allergies   Allergen Reactions    Iodine Rash    Latex Rash     Past Medical History:   Diagnosis Date    Acid reflux     Acute appendicitis with localized peritonitis, without perforation, abscess, or gangrene 10/21/2024    Arthritis     Breast mass     Bunion     Chronic pain     Depression     Eczema     Foot pain, left     History of transfusion     Hypertension     Pneumonia     Toenail deformity      Past Surgical History:   Procedure Laterality Date    APPENDECTOMY N/A 10/21/2024    Procedure: APPENDECTOMY LAPAROSCOPIC;  Surgeon: Yair Scott MD;  Location: [facility] MAIN OR;  Service: General;  Laterality: N/A;     SECTION      COLONOSCOPY N/A 2024    Procedure: COLONOSCOPY;  Surgeon: Yair Scott MD;  Location: [facility] ENDOSCOPY;  Service: General;  Laterality: N/A;  diverticulitis    HYSTERECTOMY       Family History   Problem Relation Age of Onset    Arthritis Mother     Depression Mother     Hyperlipidemia Mother     Cancer Paternal Grandmother     Diabetes Brother        Home  "Medications:  Prior to Admission medications    Medication Sig Start Date End Date Taking? Authorizing Provider   albuterol sulfate  (90 Base) MCG/ACT inhaler Inhale 2 puffs Every 4 (Four) Hours As Needed for Wheezing. 25   Gordo Campbell MD   cetirizine (zyrTEC) 10 MG tablet Take 1 tablet by mouth Daily. 10/24/24   Camilla Mena APRN   diclofenac (VOLTAREN) 50 MG EC tablet TAKE 1 TABLET BY MOUTH 2 TIMES A DAY 10/21/24   Camilla Mena APRN   gabapentin (NEURONTIN) 100 MG capsule Take 1 capsule by mouth 3 (Three) Times a Day.    ProviderMeri MD   hydroCHLOROthiazide 25 MG tablet TAKE 1 TABLET BY MOUTH DAILY 10/21/24   Camilla Mena APRN   ibuprofen (ADVIL,MOTRIN) 600 MG tablet Take 1 tablet by mouth Every 6 (Six) Hours As Needed for Mild Pain or Moderate Pain. 10/21/24   Yair Scott MD   Ibuprofen 3 %, Gabapentin 10 %, Baclofen 2 %, lidocaine 4 %, Ketamine HCl 4 % Apply 1-2 g topically to the appropriate area as directed 3 (Three) to 4 (Four) times daily. 24  William Frederick DPM   triamcinolone (KENALOG) 0.5 % cream Apply 1 Application topically to the appropriate area as directed 3 (Three) Times a Day As Needed for Irritation or Rash.    ProviderMeri MD        Social History:   Social History     Tobacco Use    Smoking status: Former     Current packs/day: 0.00     Average packs/day: 1.5 packs/day for 15.0 years (22.5 ttl pk-yrs)     Types: Cigars, Cigarettes     Start date: 2013     Quit date: 10/21/2024     Years since quittin.2     Passive exposure: Past    Smokeless tobacco: Never   Vaping Use    Vaping status: Never Used   Substance Use Topics    Alcohol use: Not Currently    Drug use: Yes     Frequency: 3.0 times per week     Types: \"Crack\" cocaine, Marijuana     Comment: no longer uses cocaine         Review of Systems:  Review of Systems   Constitutional:  Negative for chills and fever.   HENT:  Negative for congestion, rhinorrhea and sore " "throat.    Eyes:  Negative for pain and visual disturbance.   Respiratory:  Negative for apnea, cough, chest tightness and shortness of breath.    Cardiovascular:  Negative for chest pain and palpitations.   Gastrointestinal:  Negative for abdominal pain, diarrhea, nausea and vomiting.   Genitourinary:  Negative for difficulty urinating and dysuria.   Musculoskeletal:  Positive for arthralgias. Negative for joint swelling and myalgias.   Skin:  Negative for color change.   Neurological:  Negative for seizures and headaches.   Psychiatric/Behavioral: Negative.     All other systems reviewed and are negative.       Physical Exam:  /63 (BP Location: Left arm, Patient Position: Sitting)   Pulse 60   Temp 97.6 °F (36.4 °C) (Oral)   Resp 18   Ht 165.1 cm (65\")   Wt 96.5 kg (212 lb 11.9 oz)   SpO2 100%   BMI 35.40 kg/m²         Physical Exam  Vitals and nursing note reviewed.   Constitutional:       General: She is not in acute distress.     Appearance: Normal appearance. She is not toxic-appearing.   HENT:      Head: Normocephalic and atraumatic.      Jaw: There is normal jaw occlusion.      Mouth/Throat:      Mouth: Mucous membranes are moist.   Eyes:      General: Lids are normal.      Extraocular Movements: Extraocular movements intact.      Conjunctiva/sclera: Conjunctivae normal.      Pupils: Pupils are equal, round, and reactive to light.   Cardiovascular:      Rate and Rhythm: Normal rate and regular rhythm.      Pulses: Normal pulses.      Heart sounds: Normal heart sounds.   Pulmonary:      Effort: Pulmonary effort is normal. No respiratory distress.      Breath sounds: Normal breath sounds. No wheezing or rhonchi.   Abdominal:      General: Abdomen is flat. There is no distension.      Palpations: Abdomen is soft.      Tenderness: There is no abdominal tenderness. There is no guarding or rebound.   Musculoskeletal:         General: Tenderness (Mild appreciated upon palpation of the left hip and " knee) present. Normal range of motion.      Cervical back: Normal range of motion and neck supple.      Right lower leg: No edema.      Left lower leg: No edema.   Skin:     General: Skin is warm and dry.   Neurological:      General: No focal deficit present.      Mental Status: She is alert and oriented to person, place, and time. Mental status is at baseline.   Psychiatric:         Mood and Affect: Mood normal.         Behavior: Behavior normal.                            Medical Decision Making:      Comorbidities that affect care:    Hypertension    External Notes reviewed:          The following orders were placed and all results were independently analyzed by me:  Orders Placed This Encounter   Procedures    XR Hip With or Without Pelvis 2 - 3 View Left    XR Knee 3 View Left    PT Consult: Eval & Treat Functional Mobility Below Baseline    PT Plan of Care Cert / Re-Cert       Medications Given in the Emergency Department:  Medications   dexAMETHasone sodium phosphate injection 10 mg (10 mg Intramuscular Given 1/29/25 1552)   methocarbamol (ROBAXIN) tablet 750 mg (750 mg Oral Given 1/29/25 1552)        ED Course:    The patient was initially evaluated in the triage area where orders were placed. The patient was later dispositioned by William Grant PA-C.      The patient was advised to stay for completion of workup which includes but is not limited to communication of labs and radiological results, reassessment and plan. The patient was advised that leaving prior to disposition by a provider could result in critical findings that are not communicated to the patient.     ED Course as of 01/29/25 1637   Wed Jan 29, 2025   1453 PROVIDER IN TRIAGE  Patient was evaluated by me in triage, Hugo Cross PA-C.  Orders were placed and patient is currently awaiting final results and disposition.  [MD]      ED Course User Index  [MD] Hugo Cross PA-C       Labs:    Lab Results (last 24 hours)       ** No  results found for the last 24 hours. **             Imaging:    XR Knee 3 View Left    Result Date: 1/29/2025  XR KNEE 3 VW LEFT Date of Exam: 1/29/2025 3:26 PM EST Indication: L knee pain pain Comparison: None available. Findings: No acute fracture or malalignment. No significant joint effusion. Mild soft tissue edema along the anterior knee. Mild tricompartmental osteoarthrosis, most pronounced within the medial compartment. Patellar enthesopathy.     Impression: No acute osseous abnormality. Electronically Signed: Brenden Khan  1/29/2025 3:52 PM EST  Workstation ID: [id number]    XR Hip With or Without Pelvis 2 - 3 View Left    Result Date: 1/29/2025  XR HIP W OR WO PELVIS 2-3 VIEW LEFT Date of Exam: 1/29/2025 3:19 PM EST Indication: L hip pain Comparison: None available. Findings/    Impression: AP view of the pelvis and additional views of the left hip were obtained. No acute fracture is identified. Bony pelvis appears intact. There are mild to moderate osteoarthritic changes of the left hip. Right hip and sacroiliac joints are unremarkable. Pelvic phleboliths are seen. Soft tissues demonstrate no acute abnormality. Electronically Signed: Stevie Slater MD  1/29/2025 3:40 PM EST  Workstation ID: [id number]       Differential Diagnosis and Discussion:      Joint Pain: Differential diagnosis includes but is not limited to polyarticular arthritis, gout, tendinitis, hemarthrosis, septic arthritis, rheumatoid arthritis, bursitis, degenerative joint disease, joint effusion, autoimmune disorder, trauma, and occult neoplasm.    PROCEDURES:    X-ray were performed in the emergency department and all X-ray impressions were independently interpreted by me.    No orders to display        Procedures    MDM     X-ray left hip shows mild osteoarthritis.  X-ray left knee shows no acute abnormality.  Patient is resting comfortably at this time.  I instructed patient to return to ED if she develops any new or worsening symptoms.   Otherwise follow-up with PCP.  I gave patient a steroid shot and muscle relaxer in ED.  I will send patient home with muscle relaxer.  Physical therapy evaluated patient and gave patient exercises.  Patient states she understands and agrees with plan of care.              Patient Care Considerations:          Consultants/Shared Management Plan:    None    Social Determinants of Health:    Patient is independent, reliable, and has access to care.       Disposition and Care Coordination:    Discharged: The patient is suitable and stable for discharge with no need for consideration of admission.    I have explained the patient´s condition, diagnoses and treatment plan based on the information available to me at this time. I have answered questions and addressed any concerns. The patient has a good  understanding of the patient´s diagnosis, condition, and treatment plan as can be expected at this point. The vital signs have been stable. The patient´s condition is stable and appropriate for discharge from the emergency department.      The patient will pursue further outpatient evaluation with the primary care physician or other designated or consulting physician as outlined in the discharge instructions. They are agreeable to this plan of care and follow-up instructions have been explained in detail. The patient has received these instructions in written format and has expressed an understanding of the discharge instructions. The patient is aware that any significant change in condition or worsening of symptoms should prompt an immediate return to this or the closest emergency department or call to 911.  I have explained discharge medications and the need for follow up with the patient/caretakers. This was also printed in the discharge instructions. Patient was discharged with the following medications and follow up:      Medication List        New Prescriptions      methocarbamol 750 MG tablet  Commonly known as:  ROBAXIN  Take 1 tablet by mouth 3 (Three) Times a Day As Needed for Muscle Spasms for up to 5 days.               Where to Get Your Medications        These medications were sent to [id number] - [place] - [address] AT [facility] [address] - [phone]  - [phone]   [address], [facility] [address]      Phone: [phone]   methocarbamol 750 MG tablet      Camilla Mena, APRN  [address] [id number]  [phone]    Call in 1 day  To schedule follow-up       Final diagnoses:   Hip strain, left, initial encounter   Knee strain, left, initial encounter   Osteoarthritis of left hip, unspecified osteoarthritis type        ED Disposition       ED Disposition   Discharge    Condition   Stable    Comment   --               This medical record created using voice recognition software.             William Grant PA-C  01/29/25 [id number]

## 2025-01-29 NOTE — THERAPY EVALUATION
Patient Name: Sigrid Morgan  : 1970    MRN: [id number]                              Today's Date: 2025       Admit Date: 2025    Visit Dx:     ICD-10-CM ICD-9-CM   1. Left hip pain  M25.552 719.45     Patient Active Problem List   Diagnosis    Acute appendicitis with localized peritonitis, without perforation, abscess, or gangrene    Screening for malignant neoplasm of colon    Rotator cuff injury, initial encounter    Lung nodule    Bronchitis, mucopurulent recurrent     Past Medical History:   Diagnosis Date    Acid reflux     Acute appendicitis with localized peritonitis, without perforation, abscess, or gangrene 10/21/2024    Arthritis     Breast mass     Bunion     Chronic pain     Depression     Eczema     Foot pain, left     History of transfusion     Hypertension     Pneumonia     Toenail deformity      Past Surgical History:   Procedure Laterality Date    APPENDECTOMY N/A 10/21/2024    Procedure: APPENDECTOMY LAPAROSCOPIC;  Surgeon: Yair Scott MD;  Location: [facility] MAIN OR;  Service: General;  Laterality: N/A;     SECTION      COLONOSCOPY N/A 2024    Procedure: COLONOSCOPY;  Surgeon: Yair Scott MD;  Location: [facility] ENDOSCOPY;  Service: General;  Laterality: N/A;  diverticulitis    HYSTERECTOMY        General Information       Row Name 25 1554          Physical Therapy Time and Intention    Document Type evaluation  -LR     Mode of Treatment individual therapy  -LR       Row Name 25 1554          General Information    Patient Profile Reviewed yes  -LR     Prior Level of Function independent:  -LR               User Key  (r) = Recorded By, (t) = Taken By, (c) = Cosigned By      Initials Name Provider Type    LR Kirstie Antoine, PT Physical Therapist                  History: Pt reports last Tuesday she did a step aerobic class and has had pain in the front of her left hip/groin since then.  She reports 10/10 pain currently.  Pain is worse with  walking and lifting her leg.  She feels some clicking/popping in her hip.  She reports a history of low back pain with sciatica but states that the bottom of her left foot is more numb than normal.  She has taken Tylenol Arthritis and Ibuprofen for the pain.    Objective:    Palpation: Tender to palpation at L iliopsoas and groin    ROM:  Lumbar ROM:  Flexion: WNL  Extension: 50%  L Side bendin%  R Side bendin%  L Rotation: 50%  R Rotation: 50%    Active L hip flexion: 45 degrees  Increased pain in L hip with passive L hip flexion, IR, ER, and abduction  Full L hip abduction ROM  B knee and ankle ROM WNL     Strength:  L Hip MMT:   R Hip MMT:  Flexion: 3+/5  Flexion: 5/5  Abduction: 5/5  Abduction: 5/5  Adduction: 5/5  Adduction: 5/5    L Knee MMT:  R Knee MMT:  Flexion: 4-/5  Flexion: 5/5  Extension: 4+/5 Extension: 5/5    L Ankle MMT:  R Ankle MMT:  DF: 5/5  DF: 5/5  PF: 5/5   PF: 5/5    Special Tests:  Slump Test: NT  Straight Leg Raise Test: NT  Contralateral Straight Leg Raise Test: NT  Obers Test: NT  Hip Scouring Test: positive on L  FABERs Test: positive on L     Sensation: B LE sensation intact to light touch    Assessment/Plan:   Pt presents with a diagnosis of L hip pain and has signs/symptoms consistent with strain of L hip flexor with decreased L hip ROM and weakness that are limiting her ability to stand and walk.  Attempted manual therapy to L hip but pt was unable to tolerate.  Pt was educated in exercises to perform at home for her hip and provided with a HEP handout.     Goals:   LTG 1: The patient will be independent in HEP in order to decrease pain and improve tolerance to functional activities.  STATUS: Met    Interventions:   Manual Therapy: L iliopsosas release, L LE long axis distraction    Therapeutic Exercises: HEP: supine hip flexor stretch, standing hip flexor stretch (pt instructed to perform this instead of the supine stretch right now), hip adductor stretch in supine, heel  slide with sheet, supine hip abduction with sheet, isometric hip flexion    Modalities: not performed      Outcome Measures       Row Name 01/29/25 1554          Optimal Instrument    Optimal Instrument Optimal - 3  -LR     Standing 2  -LR     Walking - short distance 3  -LR     Walking - long distance 4  -LR     From the list, choose the 3 activities you would most like to be able to do without any difficulty Standing;Walking -short distance;Walking -long distance  -LR     Total Score Optimal - 3 9  -LR       Row Name 01/29/25 1554          Functional Assessment    Outcome Measure Options Optimal Instrument  -LR               User Key  (r) = Recorded By, (t) = Taken By, (c) = Cosigned By      Initials Name Provider Type    LR Kirstie Antoine, PT Physical Therapist                     Time Calculation:   PT Evaluation Complexity  History, PT Evaluation Complexity: 3 or more personal factors and/or comorbidities  Examination of Body Systems (PT Eval Complexity): 1-2 elements  Clinical Presentation (PT Evaluation Complexity): stable  Clinical Decision Making (PT Evaluation Complexity): low complexity  Overall Complexity (PT Evaluation Complexity): low complexity     PT Charges       Row Name 01/29/25 1601             Time Calculation    PT Received On 01/29/25  -LR         Timed Charges    74170 - PT Therapeutic Exercise Minutes 9  -LR      03304 - PT Manual Therapy Minutes 2  -LR         Untimed Charges    PT Eval/Re-eval Minutes 18  -LR         Total Minutes    Timed Charges Total Minutes 11  -LR      Untimed Charges Total Minutes 18  -LR       Total Minutes 29  -LR                User Key  (r) = Recorded By, (t) = Taken By, (c) = Cosigned By      Initials Name Provider Type    LR Kirstie Antoine, PT Physical Therapist                  Therapy Charges for Today       Code Description Service Date Service Provider Modifiers Qty    [id number]  PT THER PROC EA 15 MIN 1/29/2025 Kirstie Antoine, PT GP 1    [id number]   PT EVAL LOW COMPLEXITY 2 1/29/2025 Kirstie Antoine, PT GP 1            PT G-Codes  Outcome Measure Options: Optimal Instrument       Kirstie Antoine, PT  1/29/2025

## 2025-02-06 ENCOUNTER — OFFICE VISIT (OUTPATIENT)
Dept: FAMILY MEDICINE CLINIC | Facility: CLINIC | Age: 55
End: 2025-02-06
Payer: COMMERCIAL

## 2025-02-06 VITALS
WEIGHT: 216 LBS | SYSTOLIC BLOOD PRESSURE: 136 MMHG | OXYGEN SATURATION: 99 % | HEIGHT: 65 IN | BODY MASS INDEX: 35.99 KG/M2 | HEART RATE: 60 BPM | DIASTOLIC BLOOD PRESSURE: 65 MMHG

## 2025-02-06 DIAGNOSIS — S76.012D HIP STRAIN, LEFT, SUBSEQUENT ENCOUNTER: Primary | ICD-10-CM

## 2025-02-06 PROCEDURE — 1125F AMNT PAIN NOTED PAIN PRSNT: CPT

## 2025-02-06 PROCEDURE — 1160F RVW MEDS BY RX/DR IN RCRD: CPT

## 2025-02-06 PROCEDURE — 1159F MED LIST DOCD IN RCRD: CPT

## 2025-02-06 PROCEDURE — 99213 OFFICE O/P EST LOW 20 MIN: CPT

## 2025-02-06 RX ORDER — METHOCARBAMOL 750 MG/1
750 TABLET, FILM COATED ORAL 3 TIMES DAILY
Qty: 30 TABLET | Refills: 0 | Status: SHIPPED | OUTPATIENT
Start: 2025-02-06

## 2025-02-06 RX ORDER — METHOCARBAMOL 750 MG/1
750 TABLET, FILM COATED ORAL 3 TIMES DAILY
COMMUNITY
End: 2025-02-06 | Stop reason: SDUPTHER

## 2025-02-06 RX ORDER — METHYLPREDNISOLONE 4 MG/1
TABLET ORAL
Qty: 21 TABLET | Refills: 0 | Status: SHIPPED | OUTPATIENT
Start: 2025-02-06

## 2025-02-06 NOTE — PROGRESS NOTES
Chief Complaint  Hip Pain    SUBJECTIVE  Sigrid Morgan presents to [facility]    History of Present Illness  Patient is here for follow up on left hip strain. She was at an exercise class and had pain the next day. Pain is in left hip radiating down the front of her leg. She was seen in the ED. Xray was unremarkable for any acute injury. Mild arthritis noted. She was given robaxin which has helped some but she is out of meds now.     Past Medical History:   Diagnosis Date    Acid reflux     Acute appendicitis with localized peritonitis, without perforation, abscess, or gangrene 10/21/2024    Arthritis     Breast mass     Bunion     Chronic pain     Depression     Eczema     Foot pain, left     History of transfusion     Hypertension     Pneumonia     Toenail deformity       Family History   Problem Relation Age of Onset    Arthritis Mother     Depression Mother     Hyperlipidemia Mother     Cancer Paternal Grandmother     Diabetes Brother       Past Surgical History:   Procedure Laterality Date    APPENDECTOMY N/A 10/21/2024    Procedure: APPENDECTOMY LAPAROSCOPIC;  Surgeon: Yair Scott MD;  Location: [facility] MAIN OR;  Service: General;  Laterality: N/A;     SECTION      COLONOSCOPY N/A 2024    Procedure: COLONOSCOPY;  Surgeon: Yair Scott MD;  Location: [facility] ENDOSCOPY;  Service: General;  Laterality: N/A;  diverticulitis    HYSTERECTOMY          Current Outpatient Medications:     albuterol sulfate  (90 Base) MCG/ACT inhaler, Inhale 2 puffs Every 4 (Four) Hours As Needed for Wheezing., Disp: 18 g, Rfl: 5    cetirizine (zyrTEC) 10 MG tablet, Take 1 tablet by mouth Daily., Disp: 90 tablet, Rfl: 1    diclofenac (VOLTAREN) 50 MG EC tablet, TAKE 1 TABLET BY MOUTH 2 TIMES A DAY, Disp: 180 tablet, Rfl: 0    gabapentin (NEURONTIN) 100 MG capsule, Take 1 capsule by mouth 3 (Three) Times a Day., Disp: , Rfl:     hydroCHLOROthiazide 25 MG tablet, TAKE 1  "TABLET BY MOUTH DAILY, Disp: 90 tablet, Rfl: 1    ibuprofen (ADVIL,MOTRIN) 600 MG tablet, Take 1 tablet by mouth Every 6 (Six) Hours As Needed for Mild Pain or Moderate Pain., Disp: 30 tablet, Rfl: 0    Ibuprofen 3 %, Gabapentin 10 %, Baclofen 2 %, lidocaine 4 %, Ketamine HCl 4 %, Apply 1-2 g topically to the appropriate area as directed 3 (Three) to 4 (Four) times daily., Disp: 90 g, Rfl: 2    methocarbamol (ROBAXIN) 750 MG tablet, Take 1 tablet by mouth 3 (Three) Times a Day., Disp: 30 tablet, Rfl: 0    triamcinolone (KENALOG) 0.5 % cream, Apply 1 Application topically to the appropriate area as directed 3 (Three) Times a Day As Needed for Irritation or Rash., Disp: , Rfl:     methylPREDNISolone (MEDROL) 4 MG dose pack, Take as directed on package instructions., Disp: 21 tablet, Rfl: 0    OBJECTIVE  Vital Signs:   /65 (BP Location: Left arm, Patient Position: Sitting, Cuff Size: Large Adult)   Pulse 60   Ht 165.1 cm (65\")   Wt 98 kg (216 lb)   SpO2 99%   BMI 35.94 kg/m²    Estimated body mass index is 35.94 kg/m² as calculated from the following:    Height as of this encounter: 165.1 cm (65\").    Weight as of this encounter: 98 kg (216 lb).     Wt Readings from Last 3 Encounters:   02/06/25 98 kg (216 lb)   01/29/25 96.5 kg (212 lb 11.9 oz)   01/17/25 93.4 kg (206 lb)     BP Readings from Last 3 Encounters:   02/06/25 136/65   01/29/25 127/63   01/17/25 139/77       Physical Exam  Vitals reviewed.   Constitutional:       General: She is not in acute distress.     Appearance: She is not ill-appearing.   HENT:      Head: Normocephalic and atraumatic.   Eyes:      Conjunctiva/sclera: Conjunctivae normal.   Cardiovascular:      Rate and Rhythm: Normal rate and regular rhythm.      Heart sounds: Normal heart sounds.   Pulmonary:      Effort: Pulmonary effort is normal.      Breath sounds: Normal breath sounds.   Musculoskeletal:      Cervical back: Normal range of motion.      Left hip: Tenderness present. " No deformity. Normal range of motion.        Legs:    Neurological:      Mental Status: She is alert and oriented to person, place, and time.   Psychiatric:         Mood and Affect: Mood normal.         Behavior: Behavior normal.         Thought Content: Thought content normal.         Judgment: Judgment normal.          Result Review    Common labs          2/29/2024    08:24 10/18/2024    07:34 10/21/2024    12:09   Common Labs   Glucose 92  104  107    BUN 23  19  16    Creatinine 1.02  0.90  0.95    Sodium 143  136  132    Potassium 4.1  4.3  4.1    Chloride 104  103  95    Calcium 8.8  9.3  9.1    Albumin 4.2  3.9  4.1    Total Bilirubin 0.5  0.3  0.9    Alkaline Phosphatase 48  57  57    AST (SGOT) 21  18  11    ALT (SGPT) 13  17  12    WBC 5.13  8.75  15.95    Hemoglobin 11.9  11.6  12.5    Hematocrit 36.2  36.4  38.3    Platelets 213  282  275    Total Cholesterol 153      Triglycerides 34      HDL Cholesterol 61      LDL Cholesterol  84          US Thyroid    Result Date: 1/30/2025  Impression: No persistent nodule in the right thyroid isthmus. Planned biopsy was canceled. Follow-up thyroid ultrasound in 12 months is recommended to further evaluate other findings previously described. Electronically Signed: Eb Segovia MD  1/30/2025 3:25 PM EST  Workstation ID: [id number]    XR Knee 3 View Left    Result Date: 1/29/2025  Impression: No acute osseous abnormality. Electronically Signed: Brenden Khan  1/29/2025 3:52 PM EST  Workstation ID: [id number]    XR Hip With or Without Pelvis 2 - 3 View Left    Result Date: 1/29/2025  Impression: AP view of the pelvis and additional views of the left hip were obtained. No acute fracture is identified. Bony pelvis appears intact. There are mild to moderate osteoarthritic changes of the left hip. Right hip and sacroiliac joints are unremarkable. Pelvic phleboliths are seen. Soft tissues demonstrate no acute abnormality. Electronically Signed: Stevie Slater MD   1/29/2025 3:40 PM EST  Workstation ID: [id number]    US Thyroid    Result Date: 1/9/2025  Impression: 1.5 cm hypoechoic T RADS 4 right isthmus nodule. Recommend fine-needle biopsy to further evaluate. Solid isoechoic to slightly hyperechoic left lower thyroid nodule measuring 2.3 cm. Recommend continued follow-up in 12 months. Mildly enlarged heterogeneous thyroid. Correlate with thyroid function tests. TI-RADS: TR4 - Size greater than 1.5 cm. Ultrasound guided FNA is recommended. If previously biopsied, recommend correlation with prior biopsy results and follow up ultrasound. Electronically Signed: Jeromy Martin MD  1/9/2025 11:47 AM EST  Workstation ID: [id number]    CT Chest Without Contrast Diagnostic    Result Date: 12/26/2024  Impression: 1. Stable 5 mm noncalcified nodule in the left lower lobe. Suggest a follow-up CT scan of the chest in 1 year. 2. Findings suggest thyroid goiter. Questionable 2 cm hypodense mass in the left thyroid lobe. Recommend a thyroid ultrasound examination for further evaluation. Electronically Signed: Danis Saenz MD  12/26/2024 3:24 PM EST  Workstation ID: [id number]    MRI Ankle Left Without Contrast    Result Date: 11/14/2024  1.Mild changes of distal Achilles tendinopathy. There is no Achilles tendon tear. There is no retraction of torn fibers. 2.Mild changes of planter fasciitis. There is no disruption of the plantar fascia. 3.Mild to moderate changes of arthropathy are noted involving the articulations of the midfoot. The findings may relate to osteoarthritis. Given the involvement of the midfoot, findings secondary to developing neuropathic arthropathy could be considered.  Changes of underlying inflammatory neuropathy or gout arthropathy could also be considered. Electronically Signed: Isaias Lu MD  11/14/2024 12:33 PM EST  Workstation ID: [id number]    MRI Shoulder Right Without Contrast    Result Date: 11/14/2024  Impression: 1.Changes of tendinopathy are seen  throughout the rotator cuff. There is a superimposed small focal full-thickness tear involving the distal attachment of the anterior fibers of the supraspinatus component. 2.No evidence of biceps tendon tear or distal retraction. 3.No evidence for labral tear. 4.Moderate osteoarthritis of the glenohumeral joint. Mild hypertrophic age-related changes of the acromioclavicular joint are noted. Electronically Signed: Isaias Lu MD  11/14/2024 12:24 PM EST  Workstation ID: [id number]    CT Abdomen Pelvis Without Contrast    Result Date: 10/21/2024  1. Uncomplicated acute appendicitis. Note pelvic location of the cecum 2. 5 mm left lower lobe nodule. If the patient is high risk, then follow-up chest CT in 12 months could be considered Electronically Signed: Leroy Ion  10/21/2024 1:47 PM EDT  Workstation ID: [id number]        The above data has been reviewed by DENNIS Quinonez 02/06/2025 08:37 EST.          Patient Care Team:  Camilla Mena APRN as PCP - General (Nurse Practitioner)  Ade Daly APRN as Nurse Practitioner (Nurse Practitioner)  Gordo Campbell MD as Consulting Physician (Pulmonary Disease)            ASSESSMENT & PLAN    Diagnoses and all orders for this visit:    1. Hip strain, left, subsequent encounter (Primary)  Comments:  Start medrol dose pack, refilled robaxin, recommended massage, heat, TENs, and rest  Orders:  -     methylPREDNISolone (MEDROL) 4 MG dose pack; Take as directed on package instructions.  Dispense: 21 tablet; Refill: 0  -     methocarbamol (ROBAXIN) 750 MG tablet; Take 1 tablet by mouth 3 (Three) Times a Day.  Dispense: 30 tablet; Refill: 0         Tobacco Use: Medium Risk (2/6/2025)    Patient History     Smoking Tobacco Use: Former     Smokeless Tobacco Use: Never     Passive Exposure: Past       Follow Up     Return if symptoms worsen or fail to improve.      Patient was given instructions and counseling regarding her condition or for health maintenance advice. Please  see specific information pulled into the AVS if appropriate.   I have reviewed information obtained and documented by others and I have confirmed the accuracy of this documented note.    DENNIS Quinonez

## 2025-02-12 ENCOUNTER — TELEPHONE (OUTPATIENT)
Dept: FAMILY MEDICINE CLINIC | Facility: CLINIC | Age: 55
End: 2025-02-12

## 2025-02-12 NOTE — TELEPHONE ENCOUNTER
Caller: Sigrid Morgan    Relationship to patient: Self    Best call back number: [phone]     Patient is needing: PATIENT IS REQUESTING A CALLBACK FROM MONSE ALVARADO'S NURSE. SHE SATED THAT THE PAIN CLINIC TOLD HER THAT HER INSURANCE DOESN'T COVER HER VISITS. PLEASE CALL AND ADVISE

## 2025-03-02 ENCOUNTER — HOSPITAL ENCOUNTER (EMERGENCY)
Facility: HOSPITAL | Age: 55
Discharge: LEFT BEFORE DISCHARGE | End: 2025-03-03
Attending: EMERGENCY MEDICINE | Admitting: EMERGENCY MEDICINE
Payer: COMMERCIAL

## 2025-03-02 PROCEDURE — 99281 EMR DPT VST MAYX REQ PHY/QHP: CPT

## 2025-03-03 VITALS
HEIGHT: 65 IN | TEMPERATURE: 98.4 F | HEART RATE: 62 BPM | RESPIRATION RATE: 18 BRPM | DIASTOLIC BLOOD PRESSURE: 87 MMHG | BODY MASS INDEX: 35.11 KG/M2 | SYSTOLIC BLOOD PRESSURE: 167 MMHG | OXYGEN SATURATION: 100 % | WEIGHT: 210.76 LBS

## 2025-03-03 NOTE — ED TRIAGE NOTES
Patient c/o right sided back pain that started yesterday. Patient denies any injury to back. Denies N/V

## 2025-03-03 NOTE — ED PROVIDER NOTES
Time: 1:23 AM EST  Date of encounter:  3/2/2025  Independent Historian/Clinical History and Information was obtained by:   Patient    History is limited by: N/A    Chief Complaint: Back pain      History of Present Illness:  Patient is a 55 y.o. year old female who presents to the emergency department for evaluation of back pain x 1 day.  Denies known mechanism of injury.  Denies dysuria, chest pain, shortness of breath, loss of consciousness, shoulder pain.  Reports history of chronic back pain.  Denies saddle paresthesia.      Patient Care Team  Primary Care Provider: Camilla Mena APRN    Past Medical History:     Allergies   Allergen Reactions    Iodine Rash    Latex Rash     Past Medical History:   Diagnosis Date    Acid reflux     Acute appendicitis with localized peritonitis, without perforation, abscess, or gangrene 10/21/2024    Arthritis     Breast mass     Bunion     Chronic pain     Depression     Eczema     Foot pain, left     History of transfusion     Hypertension     Pneumonia     Toenail deformity      Past Surgical History:   Procedure Laterality Date    APPENDECTOMY N/A 10/21/2024    Procedure: APPENDECTOMY LAPAROSCOPIC;  Surgeon: Yair Scott MD;  Location: [facility] MAIN OR;  Service: General;  Laterality: N/A;     SECTION      COLONOSCOPY N/A 2024    Procedure: COLONOSCOPY;  Surgeon: Yair Scott MD;  Location: [facility] ENDOSCOPY;  Service: General;  Laterality: N/A;  diverticulitis    HYSTERECTOMY       Family History   Problem Relation Age of Onset    Arthritis Mother     Depression Mother     Hyperlipidemia Mother     Cancer Paternal Grandmother     Diabetes Brother        Home Medications:  Prior to Admission medications    Medication Sig Start Date End Date Taking? Authorizing Provider   albuterol sulfate  (90 Base) MCG/ACT inhaler Inhale 2 puffs Every 4 (Four) Hours As Needed for Wheezing. 25   Gordo Campbell MD   cetirizine (zyrTEC) 10 MG tablet  "Take 1 tablet by mouth Daily. 10/24/24   Camilla Mena APRN   diclofenac (VOLTAREN) 50 MG EC tablet TAKE 1 TABLET BY MOUTH 2 TIMES A DAY 25   Camilla Mena APRN   gabapentin (NEURONTIN) 100 MG capsule Take 1 capsule by mouth 3 (Three) Times a Day.    ProviderMeri MD   hydroCHLOROthiazide 25 MG tablet TAKE 1 TABLET BY MOUTH DAILY 10/21/24   Camilla Mena APRN   ibuprofen (ADVIL,MOTRIN) 600 MG tablet Take 1 tablet by mouth Every 6 (Six) Hours As Needed for Mild Pain or Moderate Pain. 10/21/24   Yair Scott MD   Ibuprofen 3 %, Gabapentin 10 %, Baclofen 2 %, lidocaine 4 %, Ketamine HCl 4 % Apply 1-2 g topically to the appropriate area as directed 3 (Three) to 4 (Four) times daily. 24  William Frederick DPM   methocarbamol (ROBAXIN) 750 MG tablet Take 1 tablet by mouth 3 (Three) Times a Day. 25   Camilla Mena APRN   methylPREDNISolone (MEDROL) 4 MG dose pack Take as directed on package instructions. 25   Camilla Mena APRN   triamcinolone (KENALOG) 0.5 % cream Apply 1 Application topically to the appropriate area as directed 3 (Three) Times a Day As Needed for Irritation or Rash.    Provider, MD Meri        Social History:   Social History     Tobacco Use    Smoking status: Former     Current packs/day: 0.00     Average packs/day: 1.5 packs/day for 15.0 years (22.5 ttl pk-yrs)     Types: Cigars, Cigarettes     Start date: 2013     Quit date: 10/21/2024     Years since quittin.3     Passive exposure: Past    Smokeless tobacco: Never   Vaping Use    Vaping status: Never Used   Substance Use Topics    Alcohol use: Not Currently    Drug use: Yes     Frequency: 3.0 times per week     Types: \"Crack\" cocaine, Marijuana     Comment: no longer uses cocaine         Review of Systems:  Review of Systems     Physical Exam:  /87 (Patient Position: Lying)   Pulse 62   Temp 98.4 °F (36.9 °C) (Oral)   Resp 18   Ht 165.1 cm (65\")   Wt 95.6 kg (210 lb 12.2 oz)   " SpO2 100%   BMI 35.07 kg/m²     Physical Exam  HENT:      Head: Normocephalic.      Mouth/Throat:      Mouth: Mucous membranes are moist.   Eyes:      Pupils: Pupils are equal, round, and reactive to light.   Pulmonary:      Effort: Pulmonary effort is normal.   Abdominal:      General: There is no distension.   Musculoskeletal:      Cervical back: Neck supple.      Comments: Evidence of lumbar paraspinal tenderness.  No evidence of CVA tenderness   Skin:     General: Skin is warm and dry.   Neurological:      General: No focal deficit present.      Mental Status: She is alert and oriented to person, place, and time.   Psychiatric:         Mood and Affect: Mood normal.         Behavior: Behavior normal.                    Medical Decision Making:      Comorbidities that affect care:        External Notes reviewed:          The following orders were placed and all results were independently analyzed by me:  No orders of the defined types were placed in this encounter.      Medications Given in the Emergency Department:  Medications - No data to display     ED Course:    ED Course as of 03/03/25 0211   Mon Mar 03, 2025   0210 --- PROVIDER IN TRIAGE NOTE ---    The patient was evaluated by meBrian in triage. Orders were placed and the patient is currently awaiting disposition.  [CB]      ED Course User Index  [CB] Brian Cantu, PAHARVEY       Labs:    Lab Results (last 24 hours)       ** No results found for the last 24 hours. **             Imaging:    No Radiology Exams Resulted Within Past 24 Hours      Differential Diagnosis and Discussion:    Back Pain: The patient presents with back pain. My differential diagnosis includes but is not limited to acute spinal epidural abscess, acute spinal epidural bleed, cauda equina syndrome, abdominal aortic aneurysm, aortic dissection, kidney stone, pyelonephritis, musculoskeletal back pain, spinal fracture, and osteoarthritis.     PROCEDURES:        No orders to  display       Procedures    MDM                     Patient Care Considerations:          Consultants/Shared Management Plan:        Social Determinants of Health:          Disposition and Care Coordination:    Eloped: This patient has left the emergency department or waiting room with no communication to myself, nursing or administrative staff. There was no opportunity to discuss the patient's decision to leave, provide medical advice or discuss alternatives to. The staff has made efforts to locate patient without success.        Final diagnoses:   None        ED Disposition       ED Disposition   Discharge    Condition   Stable    Comment   --               This medical record created using voice recognition software.             Brian Cantu PA-C  03/04/25 150

## 2025-03-13 ENCOUNTER — HOSPITAL ENCOUNTER (OUTPATIENT)
Dept: RESPIRATORY THERAPY | Facility: HOSPITAL | Age: 55
Discharge: HOME | End: 2025-03-13
Payer: COMMERCIAL

## 2025-03-13 DIAGNOSIS — J41.1 BRONCHITIS, MUCOPURULENT RECURRENT: ICD-10-CM

## 2025-03-13 DIAGNOSIS — R91.1 LUNG NODULE: ICD-10-CM

## 2025-03-13 PROCEDURE — 94729 DIFFUSING CAPACITY: CPT

## 2025-03-13 PROCEDURE — 94060 EVALUATION OF WHEEZING: CPT

## 2025-03-13 PROCEDURE — 94726 PLETHYSMOGRAPHY LUNG VOLUMES: CPT

## 2025-03-13 RX ORDER — ALBUTEROL SULFATE 0.83 MG/ML
2.5 SOLUTION RESPIRATORY (INHALATION) ONCE
Status: COMPLETED | OUTPATIENT
Start: 2025-03-13 | End: 2025-03-13

## 2025-03-13 RX ADMIN — ALBUTEROL SULFATE 2.5 MG: 2.5 SOLUTION RESPIRATORY (INHALATION) at 10:07

## 2025-03-13 NOTE — PROGRESS NOTES
Exercise Oximetry    Patient Name:Sigrid Morgan   MRN: [id number]   Date: 03/13/25             ROOM AIR BASELINE   SpO2% 96   Heart Rate 66   Blood Pressure 151/97     EXERCISE ON ROOM AIR SpO2% EXERCISE ON O2 @  LPM SpO2%   1 MINUTE 98 1 MINUTE    2 MINUTES 98 2 MINUTES    3 MINUTES 97 3 MINUTES    4 MINUTES 98 4 MINUTES    5 MINUTES 98 5 MINUTES    6 MINUTES 98 6 MINUTES               Distance Walked  1000 feet Distance Walked   Dyspnea (Laith Scale)  5 Dyspnea (Laith Scale)   Fatigue (Laith Scale)  7 Fatigue (Laith Scale)   SpO2% Post Exercise  98 SpO2% Post Exercise   HR Post Exercise  91 HR Post Exercise   Time to Recovery   Time to Recovery     Comments

## 2025-04-11 ENCOUNTER — OFFICE VISIT (OUTPATIENT)
Dept: FAMILY MEDICINE CLINIC | Facility: CLINIC | Age: 55
End: 2025-04-11
Payer: COMMERCIAL

## 2025-04-11 VITALS
HEIGHT: 65 IN | HEART RATE: 51 BPM | SYSTOLIC BLOOD PRESSURE: 144 MMHG | WEIGHT: 212.4 LBS | BODY MASS INDEX: 35.39 KG/M2 | DIASTOLIC BLOOD PRESSURE: 68 MMHG | OXYGEN SATURATION: 100 %

## 2025-04-11 DIAGNOSIS — N95.1 VASOMOTOR SYMPTOMS DUE TO MENOPAUSE: ICD-10-CM

## 2025-04-11 DIAGNOSIS — Z13.220 SCREENING FOR LIPID DISORDERS: ICD-10-CM

## 2025-04-11 DIAGNOSIS — F41.9 ANXIETY: ICD-10-CM

## 2025-04-11 DIAGNOSIS — E04.1 THYROID NODULE: ICD-10-CM

## 2025-04-11 DIAGNOSIS — F32.A DEPRESSION, UNSPECIFIED DEPRESSION TYPE: ICD-10-CM

## 2025-04-11 DIAGNOSIS — G89.29 OTHER CHRONIC PAIN: ICD-10-CM

## 2025-04-11 DIAGNOSIS — Z91.09 ENVIRONMENTAL ALLERGIES: ICD-10-CM

## 2025-04-11 DIAGNOSIS — I10 PRIMARY HYPERTENSION: ICD-10-CM

## 2025-04-11 DIAGNOSIS — Z00.00 ANNUAL PHYSICAL EXAM: Primary | ICD-10-CM

## 2025-04-11 RX ORDER — METHOCARBAMOL 750 MG/1
750 TABLET, FILM COATED ORAL 3 TIMES DAILY
Qty: 30 TABLET | Refills: 0 | Status: SHIPPED | OUTPATIENT
Start: 2025-04-11

## 2025-04-11 RX ORDER — CETIRIZINE HYDROCHLORIDE 10 MG/1
10 TABLET ORAL DAILY
Qty: 90 TABLET | Refills: 1 | Status: SHIPPED | OUTPATIENT
Start: 2025-04-11

## 2025-04-11 RX ORDER — AMLODIPINE BESYLATE 5 MG/1
5 TABLET ORAL DAILY
Qty: 30 TABLET | Refills: 0 | Status: SHIPPED | OUTPATIENT
Start: 2025-04-11

## 2025-04-11 RX ORDER — PAROXETINE 10 MG/1
10 TABLET, FILM COATED ORAL EVERY MORNING
Qty: 30 TABLET | Refills: 0 | Status: SHIPPED | OUTPATIENT
Start: 2025-04-11

## 2025-04-11 RX ORDER — LISINOPRIL 10 MG/1
10 TABLET ORAL DAILY
Qty: 30 TABLET | Refills: 0 | Status: CANCELLED | OUTPATIENT
Start: 2025-04-11

## 2025-04-11 NOTE — PROGRESS NOTES
Chief Complaint  Annual Exam    SUBJECTIVE  Sigrid Morgan presents to [facility]    History of Present Illness  Patient is a 55-year-old female presents today for annual physical and hypertension.        HTN-  Patient is currently on HCTZ 25 mg. She has been checking her blood pressure at home and she stays it has been averaging 150s/80s.  Patient reports she just recently got a wrist blood pressure cuff has been checking once in the morning after she takes her medication and once again in the evening.  Blood pressure in office today stable at 144/68. Denies any chest pain headache blurry vision shortness of breath.      Patient reports increasing depression anxiety.  Patient was previously on Paxil several years ago would like to restart.  Patient also having some flashes and night sweats related to menopause.  Patient would also like to discuss psychological evaluation/therapy.  Referral to be placed today.    Chronic pain- she is established with pain management for her spinal stenosis.  States that current pain management will not accept her insurance, needs new referral.     Patient declines vaccines today. Patient understands the risks of not having.      Patient is due labs. Orders placed at today's visit.      No other concerns or complaints at this time.  Patient denies any diarrhea, constipation, blood in stool, urinary urgency, frequency, or dysuria, chest pain, shortness of breath or syncope.          Past Medical History:   Diagnosis Date    Acid reflux     Acute appendicitis with localized peritonitis, without perforation, abscess, or gangrene 10/21/2024    Arthritis     Breast mass 2024    Bunion     Chronic pain     Depression     Eczema     Foot pain, left     History of transfusion 2015    Hypertension     Pneumonia     Toenail deformity       Family History   Problem Relation Age of Onset    Arthritis Mother     Depression Mother     Hyperlipidemia Mother      Cancer Paternal Grandmother     Diabetes Brother       Past Surgical History:   Procedure Laterality Date    APPENDECTOMY N/A 10/21/2024    Procedure: APPENDECTOMY LAPAROSCOPIC;  Surgeon: Yair Scott MD;  Location: [facility] MAIN OR;  Service: General;  Laterality: N/A;     SECTION      COLONOSCOPY N/A 2024    Procedure: COLONOSCOPY;  Surgeon: Yair Scott MD;  Location: [facility] ENDOSCOPY;  Service: General;  Laterality: N/A;  diverticulitis    HYSTERECTOMY          Current Outpatient Medications:     albuterol sulfate  (90 Base) MCG/ACT inhaler, Inhale 2 puffs Every 4 (Four) Hours As Needed for Wheezing., Disp: 18 g, Rfl: 5    cetirizine (zyrTEC) 10 MG tablet, Take 1 tablet by mouth Daily., Disp: 90 tablet, Rfl: 1    diclofenac (VOLTAREN) 50 MG EC tablet, Take 1 tablet by mouth 2 (Two) Times a Day., Disp: 180 tablet, Rfl: 0    gabapentin (NEURONTIN) 100 MG capsule, Take 1 capsule by mouth 3 (Three) Times a Day. (Patient taking differently: Take 1 capsule by mouth 3 (Three) Times a Day. PRN), Disp: , Rfl:     hydroCHLOROthiazide 25 MG tablet, TAKE 1 TABLET BY MOUTH DAILY, Disp: 90 tablet, Rfl: 1    Ibuprofen 3 %, Gabapentin 10 %, Baclofen 2 %, lidocaine 4 %, Ketamine HCl 4 %, Apply 1-2 g topically to the appropriate area as directed 3 (Three) to 4 (Four) times daily., Disp: 90 g, Rfl: 2    methocarbamol (ROBAXIN) 750 MG tablet, Take 1 tablet by mouth 3 (Three) Times a Day., Disp: 30 tablet, Rfl: 0    triamcinolone (KENALOG) 0.5 % cream, Apply 1 Application topically to the appropriate area as directed 3 (Three) Times a Day As Needed for Irritation or Rash., Disp: , Rfl:     amLODIPine (NORVASC) 5 MG tablet, Take 1 tablet by mouth Daily., Disp: 30 tablet, Rfl: 0    PARoxetine (Paxil) 10 MG tablet, Take 1 tablet by mouth Every Morning., Disp: 30 tablet, Rfl: 0    OBJECTIVE  Vital Signs:   /68 (BP Location: Left arm, Patient Position: Sitting, Cuff Size: Large Adult)   Pulse 51   Ht  "165.1 cm (65\")   Wt 96.3 kg (212 lb 6.4 oz)   SpO2 100%   BMI 35.35 kg/m²    Estimated body mass index is 35.35 kg/m² as calculated from the following:    Height as of this encounter: 165.1 cm (65\").    Weight as of this encounter: 96.3 kg (212 lb 6.4 oz).     Wt Readings from Last 3 Encounters:   04/11/25 96.3 kg (212 lb 6.4 oz)   02/06/25 98 kg (216 lb)   01/29/25 96.5 kg (212 lb 11.9 oz)     BP Readings from Last 3 Encounters:   04/11/25 144/68   02/06/25 136/65   01/29/25 127/63       Physical Exam  Vitals reviewed.   Constitutional:       General: She is awake. She is not in acute distress.     Appearance: She is well-groomed. She is obese. She is not ill-appearing.   HENT:      Head: Normocephalic and atraumatic.      Right Ear: Tympanic membrane, ear canal and external ear normal.      Left Ear: Tympanic membrane, ear canal and external ear normal.      Nose: Nose normal.      Mouth/Throat:      Mouth: Mucous membranes are moist.      Pharynx: Oropharynx is clear. No oropharyngeal exudate or posterior oropharyngeal erythema.   Eyes:      Extraocular Movements: Extraocular movements intact.      Conjunctiva/sclera: Conjunctivae normal.      Pupils: Pupils are equal, round, and reactive to light.   Neck:      Thyroid: No thyroid mass, thyromegaly or thyroid tenderness.   Cardiovascular:      Rate and Rhythm: Normal rate and regular rhythm.      Heart sounds: Normal heart sounds.   Pulmonary:      Effort: Pulmonary effort is normal.      Breath sounds: Normal breath sounds.   Abdominal:      General: Abdomen is flat. Bowel sounds are normal. There is no distension.      Palpations: Abdomen is soft.      Tenderness: There is no abdominal tenderness.   Musculoskeletal:         General: Normal range of motion.      Cervical back: Normal range of motion and neck supple. No rigidity or tenderness.   Lymphadenopathy:      Cervical: No cervical adenopathy.   Skin:     General: Skin is warm and dry.   Neurological: "      Mental Status: She is alert and oriented to person, place, and time.   Psychiatric:         Mood and Affect: Mood normal.         Behavior: Behavior normal. Behavior is cooperative.         Thought Content: Thought content normal.         Judgment: Judgment normal.          Result Review    Common labs          10/18/2024    07:34 10/21/2024    12:09   Common Labs   Glucose 104  107    BUN 19  16    Creatinine 0.90  0.95    Sodium 136  132    Potassium 4.3  4.1    Chloride 103  95    Calcium 9.3  9.1    Albumin 3.9  4.1    Total Bilirubin 0.3  0.9    Alkaline Phosphatase 57  57    AST (SGOT) 18  11    ALT (SGPT) 17  12    WBC 8.75  15.95    Hemoglobin 11.6  12.5    Hematocrit 36.4  38.3    Platelets 282  275        US Thyroid  Result Date: 1/30/2025  Impression: No persistent nodule in the right thyroid isthmus. Planned biopsy was canceled. Follow-up thyroid ultrasound in 12 months is recommended to further evaluate other findings previously described. Electronically Signed: Eb Segovia MD  1/30/2025 3:25 PM EST  Workstation ID: [id number]    XR Knee 3 View Left  Result Date: 1/29/2025  Impression: No acute osseous abnormality. Electronically Signed: Brenden Khan  1/29/2025 3:52 PM EST  Workstation ID: [id number]    XR Hip With or Without Pelvis 2 - 3 View Left  Result Date: 1/29/2025  Impression: AP view of the pelvis and additional views of the left hip were obtained. No acute fracture is identified. Bony pelvis appears intact. There are mild to moderate osteoarthritic changes of the left hip. Right hip and sacroiliac joints are unremarkable. Pelvic phleboliths are seen. Soft tissues demonstrate no acute abnormality. Electronically Signed: Stevie Slater MD  1/29/2025 3:40 PM EST  Workstation ID: [id number]    US Thyroid  Result Date: 1/9/2025  Impression: 1.5 cm hypoechoic T RADS 4 right isthmus nodule. Recommend fine-needle biopsy to further evaluate. Solid isoechoic to slightly hyperechoic left lower  thyroid nodule measuring 2.3 cm. Recommend continued follow-up in 12 months. Mildly enlarged heterogeneous thyroid. Correlate with thyroid function tests. TI-RADS: TR4 - Size greater than 1.5 cm. Ultrasound guided FNA is recommended. If previously biopsied, recommend correlation with prior biopsy results and follow up ultrasound. Electronically Signed: Jeromy Martin MD  1/9/2025 11:47 AM EST  Workstation ID: [id number]    CT Chest Without Contrast Diagnostic  Result Date: 12/26/2024  Impression: 1. Stable 5 mm noncalcified nodule in the left lower lobe. Suggest a follow-up CT scan of the chest in 1 year. 2. Findings suggest thyroid goiter. Questionable 2 cm hypodense mass in the left thyroid lobe. Recommend a thyroid ultrasound examination for further evaluation. Electronically Signed: Danis Saenz MD  12/26/2024 3:24 PM EST  Workstation ID: [id number]        The above data has been reviewed by DENNIS Quinonez 04/11/2025 14:29 EDT.          Patient Care Team:  Camilla Mena APRN as PCP - General (Nurse Practitioner)  Ade Daly APRN as Nurse Practitioner (Nurse Practitioner)  Gordo Campbell MD as Consulting Physician (Pulmonary Disease)    Class 2 Severe Obesity (BMI >=35 and <=39.9). Obesity-related health conditions include the following: hypertension. Obesity is improving with lifestyle modifications. BMI is is above average; BMI management plan is completed. We discussed low calorie, low carb based diet program, portion control, and increasing exercise.       ASSESSMENT & PLAN    Diagnoses and all orders for this visit:    1. Annual physical exam (Primary)  Comments:  Preventative counseling  Healthy diet  Daily exercise  Adequate sleep  Orders:  -     Comprehensive Metabolic Panel; Future  -     CBC & Differential; Future  -     Lipid Panel; Future  -     TSH+Free T4; Future    2. Environmental allergies  Comments:  refilled zyrtec  Orders:  -     cetirizine (zyrTEC) 10 MG tablet; Take 1 tablet by  mouth Daily.  Dispense: 90 tablet; Refill: 1    3. Other chronic pain  Comments:  Continue to see pain management, refilled Voltaren and Robaxin  Orders:  -     methocarbamol (ROBAXIN) 750 MG tablet; Take 1 tablet by mouth 3 (Three) Times a Day.  Dispense: 30 tablet; Refill: 0  -     diclofenac (VOLTAREN) 50 MG EC tablet; Take 1 tablet by mouth 2 (Two) Times a Day.  Dispense: 180 tablet; Refill: 0    4. Primary hypertension  Comments:  unontrolled, add on amlodipine 5 mg, continue HCTZ 25 mg, keep repeat log, follow-up  Orders:  -     Comprehensive Metabolic Panel; Future  -     Lipid Panel; Future  -     amLODIPine (NORVASC) 5 MG tablet; Take 1 tablet by mouth Daily.  Dispense: 30 tablet; Refill: 0    5. Thyroid nodule  Comments:  Repeat thyroid labs  Orders:  -     TSH+Free T4; Future    6. Screening for lipid disorders  -     Lipid Panel; Future    7. Depression, unspecified depression type  Comments:  Paxil 10 mg, side effects discussed, 1 month follow-up, referral to behavioral health  Orders:  -     Ambulatory Referral to Behavioral Health  -     PARoxetine (Paxil) 10 MG tablet; Take 1 tablet by mouth Every Morning.  Dispense: 30 tablet; Refill: 0    8. Anxiety  Comments:  Paxil 10 mg, side effects discussed, 1 month follow-up, referral to behavioral health  Orders:  -     Ambulatory Referral to Behavioral Health  -     PARoxetine (Paxil) 10 MG tablet; Take 1 tablet by mouth Every Morning.  Dispense: 30 tablet; Refill: 0    9. Vasomotor symptoms due to menopause  Comments:  Paxil 10 mg, side effects discussed, 1 month follow-up,  Orders:  -     PARoxetine (Paxil) 10 MG tablet; Take 1 tablet by mouth Every Morning.  Dispense: 30 tablet; Refill: 0         Tobacco Use: Medium Risk (4/11/2025)    Patient History     Smoking Tobacco Use: Former     Smokeless Tobacco Use: Never     Passive Exposure: Past       Follow Up     Return in about 1 month (around 5/11/2025) for Next scheduled follow up.      Patient was  given instructions and counseling regarding her condition or for health maintenance advice. Please see specific information pulled into the AVS if appropriate.   I have reviewed information obtained and documented by others and I have confirmed the accuracy of this documented note.    DENNIS Quinonez

## 2025-04-28 ENCOUNTER — OFFICE VISIT (OUTPATIENT)
Dept: FAMILY MEDICINE CLINIC | Facility: CLINIC | Age: 55
End: 2025-04-28
Payer: COMMERCIAL

## 2025-04-28 VITALS
OXYGEN SATURATION: 99 % | SYSTOLIC BLOOD PRESSURE: 150 MMHG | DIASTOLIC BLOOD PRESSURE: 89 MMHG | WEIGHT: 205 LBS | HEART RATE: 54 BPM | RESPIRATION RATE: 18 BRPM | BODY MASS INDEX: 34.11 KG/M2

## 2025-04-28 DIAGNOSIS — R11.2 NAUSEA AND VOMITING, UNSPECIFIED VOMITING TYPE: ICD-10-CM

## 2025-04-28 DIAGNOSIS — R09.81 CONGESTION OF NASAL SINUS: ICD-10-CM

## 2025-04-28 DIAGNOSIS — J01.40 ACUTE NON-RECURRENT PANSINUSITIS: Primary | ICD-10-CM

## 2025-04-28 DIAGNOSIS — R19.7 DIARRHEA, UNSPECIFIED TYPE: ICD-10-CM

## 2025-04-28 LAB
EXPIRATION DATE: NORMAL
EXPIRATION DATE: NORMAL
FLUAV AG UPPER RESP QL IA.RAPID: NOT DETECTED
FLUBV AG UPPER RESP QL IA.RAPID: NOT DETECTED
INTERNAL CONTROL: NORMAL
INTERNAL CONTROL: NORMAL
Lab: NORMAL
Lab: NORMAL
S PYO AG THROAT QL: NEGATIVE
SARS-COV-2 AG UPPER RESP QL IA.RAPID: NOT DETECTED

## 2025-04-28 RX ORDER — LOPERAMIDE HYDROCHLORIDE 2 MG/1
2 TABLET ORAL 4 TIMES DAILY PRN
Qty: 8 TABLET | Refills: 0 | Status: SHIPPED | OUTPATIENT
Start: 2025-04-28

## 2025-04-28 NOTE — LETTER
April 28, 2025     Patient: Sigrid Morgan   YOB: 1970   Date of Visit: 4/28/2025       To Whom It May Concern:    It is my medical opinion that Sigrid Morgan may return to work on Thursday 5/1/25. She was seen in my office on 4/28/25.          Sincerely,        DENNIS Muñiz    CC: No Recipients

## 2025-04-28 NOTE — PROGRESS NOTES
Chief Complaint   Patient presents with    Follow up     - Diarrhea  - Congestion   - Cough           Subjective     Sigrid Morgan  has a past medical history of Acid reflux, Acute appendicitis with localized peritonitis, without perforation, abscess, or gangrene (10/21/2024), Arthritis, Breast mass (2024), Bunion, Chronic pain, Depression, Eczema, Foot pain, left, History of transfusion (2015), Hypertension, Pneumonia, and Toenail deformity.    HPI    History of Present Illness      Allergies   Allergen Reactions    Iodine Rash    Latex Rash         Current Outpatient Medications:     albuterol sulfate  (90 Base) MCG/ACT inhaler, Inhale 2 puffs Every 4 (Four) Hours As Needed for Wheezing., Disp: 18 g, Rfl: 5    amLODIPine (NORVASC) 5 MG tablet, Take 1 tablet by mouth Daily., Disp: 30 tablet, Rfl: 0    cetirizine (zyrTEC) 10 MG tablet, Take 1 tablet by mouth Daily., Disp: 90 tablet, Rfl: 1    diclofenac (VOLTAREN) 50 MG EC tablet, Take 1 tablet by mouth 2 (Two) Times a Day., Disp: 180 tablet, Rfl: 0    gabapentin (NEURONTIN) 100 MG capsule, Take 1 capsule by mouth 3 (Three) Times a Day. (Patient taking differently: Take 1 capsule by mouth 3 (Three) Times a Day. PRN), Disp: , Rfl:     hydroCHLOROthiazide 25 MG tablet, TAKE 1 TABLET BY MOUTH DAILY, Disp: 90 tablet, Rfl: 1    Ibuprofen 3 %, Gabapentin 10 %, Baclofen 2 %, lidocaine 4 %, Ketamine HCl 4 %, Apply 1-2 g topically to the appropriate area as directed 3 (Three) to 4 (Four) times daily., Disp: 90 g, Rfl: 2    methocarbamol (ROBAXIN) 750 MG tablet, Take 1 tablet by mouth 3 (Three) Times a Day., Disp: 30 tablet, Rfl: 0    PARoxetine (Paxil) 10 MG tablet, Take 1 tablet by mouth Every Morning., Disp: 30 tablet, Rfl: 0    triamcinolone (KENALOG) 0.5 % cream, Apply 1 Application topically to the appropriate area as directed 3 (Three) Times a Day As Needed for Irritation or Rash., Disp: , Rfl:     Patient Active Problem List   Diagnosis    Acute appendicitis  "with localized peritonitis, without perforation, abscess, or gangrene    Screening for malignant neoplasm of colon    Rotator cuff injury, initial encounter    Lung nodule    Bronchitis, mucopurulent recurrent        Past Surgical History:   Procedure Laterality Date    APPENDECTOMY N/A 10/21/2024    Procedure: APPENDECTOMY LAPAROSCOPIC;  Surgeon: Yair Scott MD;  Location: [facility] MAIN OR;  Service: General;  Laterality: N/A;     SECTION      COLONOSCOPY N/A 2024    Procedure: COLONOSCOPY;  Surgeon: Yair Scott MD;  Location: [facility] ENDOSCOPY;  Service: General;  Laterality: N/A;  diverticulitis    HYSTERECTOMY         Social History     Socioeconomic History    Marital status: Single   Tobacco Use    Smoking status: Former     Current packs/day: 0.00     Average packs/day: 1.5 packs/day for 15.0 years (22.5 ttl pk-yrs)     Types: Cigars, Cigarettes     Start date: 2013     Quit date: 10/21/2024     Years since quittin.5     Passive exposure: Past    Smokeless tobacco: Never   Vaping Use    Vaping status: Never Used   Substance and Sexual Activity    Alcohol use: Not Currently    Drug use: Yes     Frequency: 3.0 times per week     Types: \"Crack\" cocaine, Marijuana     Comment: no longer uses cocaine    Sexual activity: Not Currently     Partners: Male     Birth control/protection: None       Family History   Problem Relation Age of Onset    Arthritis Mother     Depression Mother     Hyperlipidemia Mother     Cancer Paternal Grandmother     Diabetes Brother        Family history, surgical history, past medical history, Allergies and med's reviewed with patient today and updated in [facility] EMR.     ROS:  Review of Systems    OBJECTIVE:  Vitals:    25 1149   BP: 150/89   Pulse: 54   Resp: 18   SpO2: 99%   Weight: 93 kg (205 lb)     Body mass index is 34.11 kg/m².  No LMP recorded. Patient has had a hysterectomy.    Physical Exam    Physical Exam      Procedures            There are no " preventive care reminders to display for this patient.     No visits with results within 30 Day(s) from this visit.   Latest known visit with results is:   Lab on 01/14/2025   Component Date Value Ref Range Status    T3, Free 01/14/2025 3.36  2.00 - 4.40 pg/mL Final    IgE 01/14/2025 74  6 - 495 IU/mL Final    TSH 01/14/2025 0.565  0.270 - 4.200 uIU/mL Final    Free T4 01/14/2025 1.52  0.92 - 1.68 ng/dL Final       Results        ASSESSMENT/ PLAN:    There are no diagnoses linked to this encounter.    Assessment & Plan      Orders Placed Today:     No orders of the defined types were placed in this encounter.       Management Plan:     An After Visit Summary was printed and given to the patient at discharge.    Follow-up: No follow-ups on file.    Patient or patient representative verbalized consent for the use of Ambient Listening during the visit with  DENNIS Muñiz for chart documentation. 4/28/2025  14:26 EDT    DENNIS Muñiz 4/28/2025 11:55 EDT  This note was electronically signed.

## 2025-04-28 NOTE — PROGRESS NOTES
No chief complaint on file.       Jordan Morgan  has a past medical history of Acid reflux, Acute appendicitis with localized peritonitis, without perforation, abscess, or gangrene (10/21/2024), Arthritis, Breast mass (2024), Bunion, Chronic pain, Depression, Eczema, Foot pain, left, History of transfusion (2015), Hypertension, Pneumonia, and Toenail deformity.    HPI    History of Present Illness  The patient is a 55-year-old female who presents today with nausea, vomiting, and diarrhea. 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    Symptoms have been present for several weeks, with consistent diarrhea occurring for the past week, including this morning. Diarrhea episodes occur daily, approximately 3 to 4 times per day. Nausea, sour stomach, and sour burps are also reported. The last episode of vomiting was on Friday, 04/25/2025. She has been monitoring her diet and taking over-the-counter medications since the week of 04/18/2025.    Additionally, she reports a severe cough and congestion with discolored phlegm. A child under her care was recently hospitalized with norovirus. She is unaware of any recent cases of COVID-19 or influenza among the children but mentions a former employee diagnosed with COVID-19 and strep a few weeks ago. She has experienced chills and hot flashes. She uses a sleep apnea machine at night and has noticed an increase in episodes, with a reading of 4.5 on 04/27/2025.  DayQuil has been taken during the day, which she finds helpful. No allergies to antibiotics are reported. She has never contracted COVID-19.      Allergies   Allergen Reactions    Iodine Rash    Latex Rash         Current Outpatient Medications:     albuterol sulfate  (90 Base) MCG/ACT inhaler, Inhale 2 puffs Every 4 (Four) Hours As Needed for Wheezing., Disp: 18 g, Rfl: 5    amLODIPine (NORVASC) 5 MG tablet, Take 1 tablet by mouth Daily., Disp: 30 tablet, Rfl:  0    cetirizine (zyrTEC) 10 MG tablet, Take 1 tablet by mouth Daily., Disp: 90 tablet, Rfl: 1    diclofenac (VOLTAREN) 50 MG EC tablet, Take 1 tablet by mouth 2 (Two) Times a Day., Disp: 180 tablet, Rfl: 0    gabapentin (NEURONTIN) 100 MG capsule, Take 1 capsule by mouth 3 (Three) Times a Day. (Patient taking differently: Take 1 capsule by mouth 3 (Three) Times a Day. PRN), Disp: , Rfl:     hydroCHLOROthiazide 25 MG tablet, TAKE 1 TABLET BY MOUTH DAILY, Disp: 90 tablet, Rfl: 1    Ibuprofen 3 %, Gabapentin 10 %, Baclofen 2 %, lidocaine 4 %, Ketamine HCl 4 %, Apply 1-2 g topically to the appropriate area as directed 3 (Three) to 4 (Four) times daily., Disp: 90 g, Rfl: 2    methocarbamol (ROBAXIN) 750 MG tablet, Take 1 tablet by mouth 3 (Three) Times a Day., Disp: 30 tablet, Rfl: 0    PARoxetine (Paxil) 10 MG tablet, Take 1 tablet by mouth Every Morning., Disp: 30 tablet, Rfl: 0    triamcinolone (KENALOG) 0.5 % cream, Apply 1 Application topically to the appropriate area as directed 3 (Three) Times a Day As Needed for Irritation or Rash., Disp: , Rfl:     Patient Active Problem List   Diagnosis    Acute appendicitis with localized peritonitis, without perforation, abscess, or gangrene    Screening for malignant neoplasm of colon    Rotator cuff injury, initial encounter    Lung nodule    Bronchitis, mucopurulent recurrent        Past Surgical History:   Procedure Laterality Date    APPENDECTOMY N/A 10/21/2024    Procedure: APPENDECTOMY LAPAROSCOPIC;  Surgeon: Yair Scott MD;  Location: [facility] MAIN OR;  Service: General;  Laterality: N/A;     SECTION      COLONOSCOPY N/A 2024    Procedure: COLONOSCOPY;  Surgeon: Yair Scott MD;  Location: [facility] ENDOSCOPY;  Service: General;  Laterality: N/A;  diverticulitis    HYSTERECTOMY         Social History     Socioeconomic History    Marital status: Single   Tobacco Use    Smoking status: Former     Current packs/day: 0.00     Average packs/day: 1.5  "packs/day for 15.0 years (22.5 ttl pk-yrs)     Types: Cigars, Cigarettes     Start date: 2013     Quit date: 10/21/2024     Years since quittin.5     Passive exposure: Past    Smokeless tobacco: Never   Vaping Use    Vaping status: Never Used   Substance and Sexual Activity    Alcohol use: Not Currently    Drug use: Yes     Frequency: 3.0 times per week     Types: \"Crack\" cocaine, Marijuana     Comment: no longer uses cocaine    Sexual activity: Not Currently     Partners: Male     Birth control/protection: None       Family History   Problem Relation Age of Onset    Arthritis Mother     Depression Mother     Hyperlipidemia Mother     Cancer Paternal Grandmother     Diabetes Brother        Family history, surgical history, past medical history, Allergies and med's reviewed with patient today and updated in [employer] EMR.     ROS:  Review of Systems   HENT:  Positive for congestion and sinus pressure.    Respiratory: Negative.     Cardiovascular: Negative.    Gastrointestinal:  Positive for diarrhea.       OBJECTIVE:  There were no vitals filed for this visit.  There is no height or weight on file to calculate BMI.  No LMP recorded. Patient has had a hysterectomy.    Physical Exam  HENT:      Right Ear: A middle ear effusion is present.      Left Ear: A middle ear effusion is present.      Nose:      Right Sinus: Maxillary sinus tenderness and frontal sinus tenderness present.      Left Sinus: Maxillary sinus tenderness and frontal sinus tenderness present.      Mouth/Throat:      Pharynx: Uvula midline. Posterior oropharyngeal erythema present.   Cardiovascular:      Rate and Rhythm: Normal rate and regular rhythm.      Pulses: Normal pulses.      Heart sounds: Normal heart sounds.   Pulmonary:      Effort: Pulmonary effort is normal.      Breath sounds: Normal breath sounds.   Neurological:      General: No focal deficit present.      Mental Status: She is oriented to person, place, and time. Mental status is at " baseline.         Physical Exam  Mouth/Throat: Mucous membranes moist, no erythema, no exudate  Respiratory: Clear to auscultation, no wheezing, rales or rhonchi    Procedures            There are no preventive care reminders to display for this patient.     No visits with results within 30 Day(s) from this visit.   Latest known visit with results is:   Lab on 01/14/2025   Component Date Value Ref Range Status    T3, Free 01/14/2025 3.36  2.00 - 4.40 pg/mL Final    IgE 01/14/2025 74  6 - 495 IU/mL Final    TSH 01/14/2025 0.565  0.270 - 4.200 uIU/mL Final    Free T4 01/14/2025 1.52  0.92 - 1.68 ng/dL Final       Results  Diagnostic Testing   - COVID test: Negative   - Strep test: Negative   - Flu test: Negative      ASSESSMENT/ PLAN:    There are no diagnoses linked to this encounter.    Assessment & Plan  1. Diarrhea.  - Reports consistent diarrhea for the past week, occurring three to four times daily.  - Physical exam findings indicate no acute distress; advised to take Imodium twice daily for a couple of days.  - Discussion included the possibility of stool studies if diarrhea persists after stopping Imodium to rule out bacterial causes.  - Imodium prescribed for management; if symptoms persist, further evaluation with stool studies will be necessary.    2. Nausea and Vomiting.  - Experienced nausea and vomiting, with the last episode of vomiting occurring on 04/25/2025.  - Physical exam findings indicate no acute distress; advised to stay hydrated and rest.  - Discussion included the importance of hydration and rest to manage symptoms.  - No specific medication prescribed for nausea; advised supportive care measures.    3. Congestion and Cough.  - Congestion and cough may be due to allergies or a sinus infection; symptoms have persisted for around a week.  - Physical exam findings indicate pressure in sinus areas; advised to continue taking cetirizine.  - Discussion included starting an antibiotic regimen due to  the duration of symptoms and potential bacterial infection; potential side effect of worsening diarrhea from antibiotics was discussed.  - Antibiotic prescribed; advised to consume yogurt and probiotics to replenish gut bacteria. Provided a work note through 05/01/2025, with instructions to contact the office if symptoms persist beyond Thursday.    Orders Placed Today:     No orders of the defined types were placed in this encounter.       Management Plan:     An After Visit Summary was printed and given to the patient at discharge.    Follow-up: No follow-ups on file.    Patient or patient representative verbalized consent for the use of Ambient Listening during the visit with  DENNIS Muñiz for chart documentation. 4/28/2025  14:25 EDT    DENNIS Muñiz 4/28/2025 11:49 EDT  This note was electronically signed.

## 2025-04-30 ENCOUNTER — OFFICE VISIT (OUTPATIENT)
Dept: PULMONOLOGY | Facility: CLINIC | Age: 55
End: 2025-04-30
Payer: COMMERCIAL

## 2025-04-30 ENCOUNTER — LAB (OUTPATIENT)
Dept: LAB | Facility: HOSPITAL | Age: 55
End: 2025-04-30
Payer: COMMERCIAL

## 2025-04-30 VITALS
HEIGHT: 65 IN | WEIGHT: 208 LBS | TEMPERATURE: 97 F | HEART RATE: 46 BPM | SYSTOLIC BLOOD PRESSURE: 155 MMHG | BODY MASS INDEX: 34.66 KG/M2 | DIASTOLIC BLOOD PRESSURE: 95 MMHG | RESPIRATION RATE: 14 BRPM | OXYGEN SATURATION: 100 %

## 2025-04-30 DIAGNOSIS — Z13.220 SCREENING FOR LIPID DISORDERS: ICD-10-CM

## 2025-04-30 DIAGNOSIS — F17.210 SMOKING GREATER THAN 40 PACK YEARS: ICD-10-CM

## 2025-04-30 DIAGNOSIS — G47.33 OSA (OBSTRUCTIVE SLEEP APNEA): ICD-10-CM

## 2025-04-30 DIAGNOSIS — I10 PRIMARY HYPERTENSION: ICD-10-CM

## 2025-04-30 DIAGNOSIS — R91.1 LUNG NODULE: Primary | ICD-10-CM

## 2025-04-30 DIAGNOSIS — E04.1 THYROID NODULE: ICD-10-CM

## 2025-04-30 DIAGNOSIS — Z00.00 ANNUAL PHYSICAL EXAM: ICD-10-CM

## 2025-04-30 DIAGNOSIS — J41.1 BRONCHITIS, MUCOPURULENT RECURRENT: ICD-10-CM

## 2025-04-30 LAB
ALBUMIN SERPL-MCNC: 4.4 G/DL (ref 3.5–5.2)
ALBUMIN/GLOB SERPL: 1.4 G/DL
ALP SERPL-CCNC: 59 U/L (ref 39–117)
ALT SERPL W P-5'-P-CCNC: 17 U/L (ref 1–33)
ANION GAP SERPL CALCULATED.3IONS-SCNC: 12.2 MMOL/L (ref 5–15)
AST SERPL-CCNC: 22 U/L (ref 1–32)
BASOPHILS # BLD AUTO: 0.03 10*3/MM3 (ref 0–0.2)
BASOPHILS NFR BLD AUTO: 0.4 % (ref 0–1.5)
BILIRUB SERPL-MCNC: 0.4 MG/DL (ref 0–1.2)
BUN SERPL-MCNC: 13 MG/DL (ref 6–20)
BUN/CREAT SERPL: 17.1 (ref 7–25)
CALCIUM SPEC-SCNC: 9.6 MG/DL (ref 8.6–10.5)
CHLORIDE SERPL-SCNC: 101 MMOL/L (ref 98–107)
CHOLEST SERPL-MCNC: 162 MG/DL (ref 0–200)
CO2 SERPL-SCNC: 24.8 MMOL/L (ref 22–29)
CREAT SERPL-MCNC: 0.76 MG/DL (ref 0.57–1)
DEPRECATED RDW RBC AUTO: 44.8 FL (ref 37–54)
EGFRCR SERPLBLD CKD-EPI 2021: 92.7 ML/MIN/1.73
EOSINOPHIL # BLD AUTO: 0.33 10*3/MM3 (ref 0–0.4)
EOSINOPHIL NFR BLD AUTO: 4.8 % (ref 0.3–6.2)
ERYTHROCYTE [DISTWIDTH] IN BLOOD BY AUTOMATED COUNT: 13.3 % (ref 12.3–15.4)
GLOBULIN UR ELPH-MCNC: 3.1 GM/DL
GLUCOSE SERPL-MCNC: 92 MG/DL (ref 65–99)
HCT VFR BLD AUTO: 37.9 % (ref 34–46.6)
HDLC SERPL-MCNC: 43 MG/DL (ref 40–60)
HGB BLD-MCNC: 12.5 G/DL (ref 12–15.9)
IMM GRANULOCYTES # BLD AUTO: 0.02 10*3/MM3 (ref 0–0.05)
IMM GRANULOCYTES NFR BLD AUTO: 0.3 % (ref 0–0.5)
LDLC SERPL CALC-MCNC: 107 MG/DL (ref 0–100)
LDLC/HDLC SERPL: 2.5 {RATIO}
LYMPHOCYTES # BLD AUTO: 2.2 10*3/MM3 (ref 0.7–3.1)
LYMPHOCYTES NFR BLD AUTO: 32.2 % (ref 19.6–45.3)
MCH RBC QN AUTO: 30 PG (ref 26.6–33)
MCHC RBC AUTO-ENTMCNC: 33 G/DL (ref 31.5–35.7)
MCV RBC AUTO: 91.1 FL (ref 79–97)
MONOCYTES # BLD AUTO: 0.52 10*3/MM3 (ref 0.1–0.9)
MONOCYTES NFR BLD AUTO: 7.6 % (ref 5–12)
NEUTROPHILS NFR BLD AUTO: 3.74 10*3/MM3 (ref 1.7–7)
NEUTROPHILS NFR BLD AUTO: 54.7 % (ref 42.7–76)
NRBC BLD AUTO-RTO: 0 /100 WBC (ref 0–0.2)
PLATELET # BLD AUTO: 317 10*3/MM3 (ref 140–450)
PMV BLD AUTO: 10.7 FL (ref 6–12)
POTASSIUM SERPL-SCNC: 3.4 MMOL/L (ref 3.5–5.2)
PROT SERPL-MCNC: 7.5 G/DL (ref 6–8.5)
RBC # BLD AUTO: 4.16 10*6/MM3 (ref 3.77–5.28)
SODIUM SERPL-SCNC: 138 MMOL/L (ref 136–145)
T4 FREE SERPL-MCNC: 1.63 NG/DL (ref 0.92–1.68)
TRIGL SERPL-MCNC: 58 MG/DL (ref 0–150)
TSH SERPL DL<=0.05 MIU/L-ACNC: 0.59 UIU/ML (ref 0.27–4.2)
VLDLC SERPL-MCNC: 12 MG/DL (ref 5–40)
WBC NRBC COR # BLD AUTO: 6.84 10*3/MM3 (ref 3.4–10.8)

## 2025-04-30 PROCEDURE — 85025 COMPLETE CBC W/AUTO DIFF WBC: CPT

## 2025-04-30 PROCEDURE — 84443 ASSAY THYROID STIM HORMONE: CPT

## 2025-04-30 PROCEDURE — 80053 COMPREHEN METABOLIC PANEL: CPT

## 2025-04-30 PROCEDURE — 1160F RVW MEDS BY RX/DR IN RCRD: CPT | Performed by: INTERNAL MEDICINE

## 2025-04-30 PROCEDURE — 1159F MED LIST DOCD IN RCRD: CPT | Performed by: INTERNAL MEDICINE

## 2025-04-30 PROCEDURE — 36415 COLL VENOUS BLD VENIPUNCTURE: CPT

## 2025-04-30 PROCEDURE — 99214 OFFICE O/P EST MOD 30 MIN: CPT | Performed by: INTERNAL MEDICINE

## 2025-04-30 PROCEDURE — 3077F SYST BP >= 140 MM HG: CPT | Performed by: INTERNAL MEDICINE

## 2025-04-30 PROCEDURE — 84439 ASSAY OF FREE THYROXINE: CPT

## 2025-04-30 PROCEDURE — 80061 LIPID PANEL: CPT

## 2025-04-30 PROCEDURE — 3080F DIAST BP >= 90 MM HG: CPT | Performed by: INTERNAL MEDICINE

## 2025-04-30 RX ORDER — AMLODIPINE BESYLATE 5 MG/1
10 TABLET ORAL DAILY
Qty: 30 TABLET | Refills: 5 | Status: SHIPPED | OUTPATIENT
Start: 2025-04-30

## 2025-04-30 RX ORDER — AMANTADINE HYDROCHLORIDE 100 MG/1
100 TABLET ORAL 2 TIMES DAILY
COMMUNITY
Start: 2025-04-25

## 2025-04-30 NOTE — PROGRESS NOTES
Primary Care Provider  Camilla Mena APRN     Referring Provider  No ref. provider found     Chief Complaint  Follow-up (6 MONTH), Bronchitis, Cough, and Sleep Apnea    Subjective          Sigrid Morgan presents to [facility]  History of Present Illness  Sigrid Morgan is a 55 y.o. female patient   History of Present Illness  The patient is a 55-year-old female with a history of lung nodule, recurrent bronchitis, and sleep apnea. She is here for a follow-up visit.    She reports no new health issues since her last visit. She has a history of smoking but ceased the habit in December 2024. She underwent a walking test and uses an inhaler on an as-needed basis, approximately once a week. She does not experience shortness of breath.    She utilizes a CPAP machine at night for sleep apnea management, with the most recent reading indicating 2.2 events per hour.    Her blood pressure readings have been consistently high, around 157/150. Her primary care physician has augmented her medication regimen with an additional 5 mg tablet of amlodipine, in conjunction with hydrochlorothiazide, to manage her hypertension.    SOCIAL HISTORY  The patient quit smoking in December 2024.    MEDICATIONS  Current: hydrochlorothiazide, amlodipine    Review of Systems     General:  No Fatigue, No Fever No weight loss or loss of appetite  HEENT: No dysphagia, No Visual Changes, no rhinorrhea  Respiratory:  + cough,+Dyspnea, intermittent phlegm, No Pleuritic Pain, no wheezing, no hemoptysis.  Cardiovascular: Denies chest pain, denies palpitations,+BANEGAS, No Chest Pressure  Gastrointestinal:  No Abdominal Pain, No Nausea, No Vomiting, No Diarrhea  Genitourinary:  No Dysuria, No Frequency, No Hesitancy  Musculoskeletal: No muscle pain or swelling  Endocrine:  No Heat Intolerance, No Cold Intolerance  Hematologic:  No Bleeding, No Bruising  Psychiatric:  No Anxiety, No Depression  Neurologic:   "No Confusion, no Dysarthria, No Headaches  Skin:  No Rash, No Open Wounds    Family History   Problem Relation Age of Onset    Arthritis Mother     Depression Mother     Hyperlipidemia Mother     Cancer Paternal Grandmother     Diabetes Brother         Social History     Socioeconomic History    Marital status: Single   Tobacco Use    Smoking status: Former     Current packs/day: 0.00     Average packs/day: 1.5 packs/day for 15.0 years (22.5 ttl pk-yrs)     Types: Cigars, Cigarettes     Start date: 2013     Quit date: 10/21/2024     Years since quittin.5     Passive exposure: Past    Smokeless tobacco: Never   Vaping Use    Vaping status: Never Used   Substance and Sexual Activity    Alcohol use: Not Currently    Drug use: Not Currently     Frequency: 3.0 times per week     Types: \"Crack\" cocaine, Marijuana     Comment: no longer uses cocaine    Sexual activity: Not Currently     Partners: Male     Birth control/protection: None        Past Medical History:   Diagnosis Date    Acid reflux     Acute appendicitis with localized peritonitis, without perforation, abscess, or gangrene 10/21/2024    Allergic 2016    Latex, hayfever    Anxiety     Arthritis     Arthritis of neck     Asthma     Breast mass     Bunion     Cervical disc disorder     Chronic pain     CTS (carpal tunnel syndrome)     Depression     Eczema     Foot pain, left     Fracture, foot     Hip arthrosis     History of transfusion     Hypertension     Knee swelling     Low back pain     Lumbosacral disc disease     Pneumonia     Rotator cuff syndrome     Scoliosis     Lower dosis    Tennis elbow     Toenail deformity     Visual impairment         Immunization History   Administered Date(s) Administered    Tdap 2018         Allergies   Allergen Reactions    Iodine Rash    Latex Rash          Current Outpatient Medications:     albuterol sulfate  (90 Base) MCG/ACT inhaler, Inhale 2 puffs Every 4 (Four) Hours As Needed for " "Wheezing., Disp: 18 g, Rfl: 5    amantadine (SYMMETREL) 100 MG tablet, Take 1 tablet by mouth 2 (Two) Times a Day., Disp: , Rfl:     amLODIPine (NORVASC) 5 MG tablet, Take 2 tablets by mouth Daily., Disp: 30 tablet, Rfl: 5    amoxicillin-clavulanate (AUGMENTIN) 875-125 MG per tablet, Take 1 tablet by mouth 2 (Two) Times a Day for 7 days., Disp: 14 tablet, Rfl: 0    cetirizine (zyrTEC) 10 MG tablet, Take 1 tablet by mouth Daily., Disp: 90 tablet, Rfl: 1    gabapentin (NEURONTIN) 100 MG capsule, Take 1 capsule by mouth 3 (Three) Times a Day. (Patient taking differently: Take 1 capsule by mouth 3 (Three) Times a Day. PRN), Disp: , Rfl:     hydroCHLOROthiazide 25 MG tablet, TAKE 1 TABLET BY MOUTH DAILY, Disp: 90 tablet, Rfl: 1    Ibuprofen 3 %, Gabapentin 10 %, Baclofen 2 %, lidocaine 4 %, Ketamine HCl 4 %, Apply 1-2 g topically to the appropriate area as directed 3 (Three) to 4 (Four) times daily., Disp: 90 g, Rfl: 2    methocarbamol (ROBAXIN) 750 MG tablet, Take 1 tablet by mouth 3 (Three) Times a Day., Disp: 30 tablet, Rfl: 0    PARoxetine (Paxil) 10 MG tablet, Take 1 tablet by mouth Every Morning., Disp: 30 tablet, Rfl: 0    triamcinolone (KENALOG) 0.5 % cream, Apply 1 Application topically to the appropriate area as directed 3 (Three) Times a Day As Needed for Irritation or Rash., Disp: , Rfl:     diclofenac (VOLTAREN) 50 MG EC tablet, Take 1 tablet by mouth 2 (Two) Times a Day. (Patient not taking: Reported on 4/30/2025), Disp: 180 tablet, Rfl: 0    loperamide (Imodium A-D) 2 MG tablet, Take 1 tablet by mouth 4 (Four) Times a Day As Needed for Diarrhea. (Patient not taking: Reported on 4/30/2025), Disp: 8 tablet, Rfl: 0     Objective   Physical Exam  Physical Exam  Lungs are diminished but clear.    Vital Signs  Blood pressure is elevated.    Vital Signs:   /95 (BP Location: Right arm, Patient Position: Sitting)   Pulse (!) 46   Temp 97 °F (36.1 °C) (Tympanic)   Resp 14   Ht 165.1 cm (65\")   Wt 94.3 kg " (208 lb)   SpO2 100% Comment: ROOM AIR  BMI 34.61 kg/m²       Vital Signs Reviewed  WDWN, Alert, pleasant, in no acute distress  HEENT:  PERRL, EOMI.  OP, nares clear, no sinus tenderness  Mallampatti classification : 1  Neck:  Supple, no JVD, no thyromegaly  Lymph: no axillary, cervical, supraclavicular lymphadenopathy noted bilaterally  Chest:  good aeration, bilateral diminished breath sounds, no wheezing, crackles or rhonchi, resonant to percussion b/l  CV: RRR, no MGR, pulses 2+, equal.  Abd:  Soft, NT, ND, + BS, no HSM  EXT:  no clubbing, no cyanosis, No BLE edema  Neuro:  A&Ox3, CN grossly intact, no focal deficits.  Skin: No rashes or lesions noted     Result Review :   The following data was reviewed by: Gordo Campbell MD on 04/30/2025:  Common labs          10/18/2024    07:34 10/21/2024    12:09   Common Labs   Glucose 104  107    BUN 19  16    Creatinine 0.90  0.95    Sodium 136  132    Potassium 4.3  4.1    Chloride 103  95    Calcium 9.3  9.1    Albumin 3.9  4.1    Total Bilirubin 0.3  0.9    Alkaline Phosphatase 57  57    AST (SGOT) 18  11    ALT (SGPT) 17  12    WBC 8.75  15.95    Hemoglobin 11.6  12.5    Hematocrit 36.4  38.3    Platelets 282  275      CMP          10/18/2024    07:34 10/21/2024    12:09   CMP   Glucose 104  107    BUN 19  16    Creatinine 0.90  0.95    EGFR 76.1  71.3    Sodium 136  132    Potassium 4.3  4.1    Chloride 103  95    Calcium 9.3  9.1    Total Protein 7.5  7.7    Albumin 3.9  4.1    Globulin 3.6  3.6    Total Bilirubin 0.3  0.9    Alkaline Phosphatase 57  57    AST (SGOT) 18  11    ALT (SGPT) 17  12    Albumin/Globulin Ratio 1.1  1.1    BUN/Creatinine Ratio 21.1  16.8    Anion Gap 10.2  13.4      CBC          10/18/2024    07:34 10/21/2024    12:09   CBC   WBC 8.75  15.95    RBC 3.92  4.09    Hemoglobin 11.6  12.5    Hematocrit 36.4  38.3    MCV 92.9  93.6    MCH 29.6  30.6    MCHC 31.9  32.6    RDW 13.1  14.6    Platelets 282  275      CBC w/diff           10/18/2024    07:34 10/21/2024    12:09   CBC w/Diff   WBC 8.75  15.95    RBC 3.92  4.09    Hemoglobin 11.6  12.5    Hematocrit 36.4  38.3    MCV 92.9  93.6    MCH 29.6  30.6    MCHC 31.9  32.6    RDW 13.1  14.6    Platelets 282  275    Neutrophil Rel % 70.6  77.2    Immature Granulocyte Rel % 0.3  0.3    Lymphocyte Rel % 21.4  15.3    Monocyte Rel % 5.9  6.0    Eosinophil Rel % 1.3  0.8    Basophil Rel % 0.5  0.4      Data reviewed : Radiologic studies Previous imaging reviewed.     Results  Imaging  CT scan in December showed a 5 mm lung nodule that has not changed.    Testing  Lung function test shows 92 and 93% which is within normal limit. Total lung capacity is 106%. Residual volume is 2.55, indicating early stages of COPD. Diffusion capacity is normal.             Assessment and Plan    Diagnoses and all orders for this visit:    1. Lung nodule (Primary)  -      CT Chest Low Dose Cancer Screening WO; Future    2. Bronchitis, mucopurulent recurrent  -      CT Chest Low Dose Cancer Screening WO; Future    3. IVET (obstructive sleep apnea)    4. Primary hypertension  Comments:  unontrolled, add on amlodipine 5 mg, continue HCTZ 25 mg, keep repeat log, follow-up  Orders:  -     amLODIPine (NORVASC) 5 MG tablet; Take 2 tablets by mouth Daily.  Dispense: 30 tablet; Refill: 5  -      CT Chest Low Dose Cancer Screening WO; Future    5. Smoking greater than 40 pack years  -      CT Chest Low Dose Cancer Screening WO; Future      Assessment & Plan  1. Lung nodule.  A CT scan conducted in December 2024 revealed a stable 5 mm lung nodule. Given her smoking history, annual follow-ups are necessary. A low-dose CT scan of the chest will be ordered for December 2025.    2. Early-stage COPD.  Lung function tests indicate early-stage COPD, with a residual volume of 2.55 L (normal is 1.67 L). She is advised to continue using albuterol as needed and to abstain from smoking to prevent further progression.    3. Sleep apnea.  She  is currently using a CPAP machine at night, with recent readings showing 2.2 events per hour, which is within normal limits. She is advised to continue using the CPAP machine regularly.    4. Hypertension.  Her blood pressure is significantly elevated. She is currently on hydrochlorothiazide and amlodipine. The dosage of amlodipine will be increased from 5 mg to 10 mg. Refills for amlodipine will be provided.    Follow-up  The patient will follow up in January 2026.    Follow Up   Return in about 8 months (around 1/13/2026).  Patient was given instructions and counseling regarding her condition or for health maintenance advice. Please see specific information pulled into the AVS if appropriate.     Patient or patient representative verbalized consent for the use of Ambient Listening during the visit with  Gordo Campbell MD for chart documentation. 4/30/2025  12:23 EDT    Electronically signed by Gordo Campbell MD, 4/30/2025, 12:23 EDT.

## 2025-05-01 ENCOUNTER — RESULTS FOLLOW-UP (OUTPATIENT)
Dept: LAB | Facility: HOSPITAL | Age: 55
End: 2025-05-01
Payer: COMMERCIAL

## 2025-05-01 ENCOUNTER — DOCUMENTATION (OUTPATIENT)
Dept: FAMILY MEDICINE CLINIC | Facility: CLINIC | Age: 55
End: 2025-05-01
Payer: COMMERCIAL

## 2025-05-01 ENCOUNTER — TELEPHONE (OUTPATIENT)
Dept: FAMILY MEDICINE CLINIC | Facility: CLINIC | Age: 55
End: 2025-05-01
Payer: COMMERCIAL

## 2025-05-01 DIAGNOSIS — E87.6 HYPOKALEMIA: Primary | ICD-10-CM

## 2025-05-01 RX ORDER — POTASSIUM CHLORIDE 750 MG/1
10 TABLET, EXTENDED RELEASE ORAL 2 TIMES DAILY
Qty: 14 TABLET | Refills: 0 | Status: SHIPPED | OUTPATIENT
Start: 2025-05-01 | End: 2025-05-08

## 2025-05-01 NOTE — TELEPHONE ENCOUNTER
HUB TO RELAY     Called patient to inform her that work note can be extended until Monday. No VM.

## 2025-05-02 DIAGNOSIS — I10 PRIMARY HYPERTENSION: ICD-10-CM

## 2025-05-02 RX ORDER — HYDROCHLOROTHIAZIDE 25 MG/1
25 TABLET ORAL DAILY
Qty: 90 TABLET | Refills: 1 | Status: SHIPPED | OUTPATIENT
Start: 2025-05-02

## 2025-05-02 NOTE — TELEPHONE ENCOUNTER
04/11/25 : 4. Primary hypertension  Comments:  unontrolled, add on amlodipine 5 mg, continue HCTZ 25 mg, keep repeat log, follow-up

## 2025-05-11 DIAGNOSIS — I10 PRIMARY HYPERTENSION: ICD-10-CM

## 2025-05-11 DIAGNOSIS — N95.1 VASOMOTOR SYMPTOMS DUE TO MENOPAUSE: ICD-10-CM

## 2025-05-11 DIAGNOSIS — F32.A DEPRESSION, UNSPECIFIED DEPRESSION TYPE: ICD-10-CM

## 2025-05-11 DIAGNOSIS — F41.9 ANXIETY: ICD-10-CM

## 2025-05-12 NOTE — TELEPHONE ENCOUNTER
Amlodipine (signed by Gordo Campbell MD 1 week ago)  LRF 4/30/2025  LOV 4/11/2025  F/U  5/13/2025    Paroxetine  LRF 4/11/2025  LOV 4/11/2025  F/U 5/13/2025

## 2025-05-13 RX ORDER — AMLODIPINE BESYLATE 5 MG/1
5 TABLET ORAL DAILY
Qty: 30 TABLET | Refills: 5 | OUTPATIENT
Start: 2025-05-13

## 2025-05-13 RX ORDER — PAROXETINE 10 MG/1
10 TABLET, FILM COATED ORAL EVERY MORNING
Qty: 30 TABLET | Refills: 0 | Status: SHIPPED | OUTPATIENT
Start: 2025-05-13

## 2025-05-23 DIAGNOSIS — G89.29 OTHER CHRONIC PAIN: ICD-10-CM

## 2025-05-23 RX ORDER — METHOCARBAMOL 750 MG/1
750 TABLET, FILM COATED ORAL 3 TIMES DAILY
Qty: 30 TABLET | Refills: 0 | Status: SHIPPED | OUTPATIENT
Start: 2025-05-23

## 2025-06-02 ENCOUNTER — HOSPITAL ENCOUNTER (OUTPATIENT)
Dept: ULTRASOUND IMAGING | Facility: HOSPITAL | Age: 55
Discharge: HOME | End: 2025-06-02
Payer: COMMERCIAL

## 2025-06-02 ENCOUNTER — HOSPITAL ENCOUNTER (OUTPATIENT)
Dept: MAMMOGRAPHY | Facility: HOSPITAL | Age: 55
Discharge: HOME | End: 2025-06-02
Payer: COMMERCIAL

## 2025-06-02 DIAGNOSIS — R92.8 ABNORMALITY OF RIGHT BREAST ON SCREENING MAMMOGRAM: ICD-10-CM

## 2025-06-02 PROCEDURE — G0279 TOMOSYNTHESIS, MAMMO: HCPCS

## 2025-06-02 PROCEDURE — 76642 ULTRASOUND BREAST LIMITED: CPT

## 2025-06-02 PROCEDURE — 77066 DX MAMMO INCL CAD BI: CPT

## 2025-06-09 DIAGNOSIS — N95.1 VASOMOTOR SYMPTOMS DUE TO MENOPAUSE: ICD-10-CM

## 2025-06-09 DIAGNOSIS — F32.A DEPRESSION, UNSPECIFIED DEPRESSION TYPE: ICD-10-CM

## 2025-06-09 DIAGNOSIS — F41.9 ANXIETY: ICD-10-CM

## 2025-06-10 RX ORDER — PAROXETINE 10 MG/1
10 TABLET, FILM COATED ORAL EVERY MORNING
Qty: 90 TABLET | Refills: 1 | Status: SHIPPED | OUTPATIENT
Start: 2025-06-10

## 2025-06-14 ENCOUNTER — HOSPITAL ENCOUNTER (EMERGENCY)
Facility: HOSPITAL | Age: 55
Discharge: HOME | End: 2025-06-15
Attending: EMERGENCY MEDICINE
Payer: COMMERCIAL

## 2025-06-14 DIAGNOSIS — T81.41XA SUPERFICIAL INCISIONAL SURGICAL SITE INFECTION: Primary | ICD-10-CM

## 2025-06-14 PROCEDURE — 99283 EMERGENCY DEPT VISIT LOW MDM: CPT

## 2025-06-14 RX ORDER — HYDROCODONE BITARTRATE AND ACETAMINOPHEN 5; 325 MG/1; MG/1
1 TABLET ORAL EVERY 6 HOURS PRN
COMMUNITY
Start: 2025-06-03

## 2025-06-14 RX ORDER — CEPHALEXIN 500 MG/1
500 CAPSULE ORAL 4 TIMES DAILY
Qty: 28 CAPSULE | Refills: 0 | Status: SHIPPED | OUTPATIENT
Start: 2025-06-14

## 2025-06-14 RX ORDER — IBUPROFEN 800 MG/1
800 TABLET, FILM COATED ORAL EVERY 8 HOURS PRN
COMMUNITY
Start: 2025-06-03

## 2025-06-15 VITALS
HEART RATE: 50 BPM | HEIGHT: 65 IN | SYSTOLIC BLOOD PRESSURE: 138 MMHG | OXYGEN SATURATION: 100 % | RESPIRATION RATE: 18 BRPM | DIASTOLIC BLOOD PRESSURE: 91 MMHG | TEMPERATURE: 98.2 F | WEIGHT: 216.05 LBS | BODY MASS INDEX: 36 KG/M2

## 2025-06-15 RX ADMIN — CEPHALEXIN 500 MG: 250 CAPSULE ORAL at 00:10

## 2025-06-15 NOTE — ED PROVIDER NOTES
Time: 10:58 PM EDT  Date of encounter:  2025  Independent Historian/Clinical History and Information was obtained by:   Patient and Family    History is limited by: N/A    Chief Complaint: Postoperative concern      History of Present Illness:  Patient is a 55 y.o. year old female who presents to the emergency department for evaluation of postoperative concern    Patient states on Melissa 3 she had debridement of her right shoulder via 3 laparoscopic incisions by Dr. Garza of orthopedics at [facility].  States has been doing well but over the past 2 days she has noticed the lateral and dorsal incision appear to be mildly red and a small amount of drainage on the bandages.  She has had mild increased pain in her shoulder over the past 2 days.  She has felt mildly ill but no measured fevers.  No nausea or vomiting.      Patient Care Team  Primary Care Provider: Camilla Mena APRN    Past Medical History:     Allergies   Allergen Reactions    Iodine Rash    Latex Rash     Past Medical History:   Diagnosis Date    Acid reflux     Acute appendicitis with localized peritonitis, without perforation, abscess, or gangrene 10/21/2024    Allergic 2016    Latex, hayfever    Anxiety     Arthritis     Arthritis of neck     Asthma     Breast mass     Bunion     Cervical disc disorder     Chronic pain     CTS (carpal tunnel syndrome)     Depression     Eczema     Foot pain, left     Fracture, foot     Hip arthrosis     History of transfusion     Hypertension     Knee swelling     Low back pain     Lumbosacral disc disease     Pneumonia     Rotator cuff syndrome     Scoliosis     Lower dosis    Tennis elbow     Toenail deformity     Visual impairment      Past Surgical History:   Procedure Laterality Date    APPENDECTOMY N/A 10/21/2024    Procedure: APPENDECTOMY LAPAROSCOPIC;  Surgeon: Yair Scott MD;  Location: [facility] MAIN OR;  Service: General;  Laterality: N/A;     SECTION      COLONOSCOPY  N/A 11/18/2024    Procedure: COLONOSCOPY;  Surgeon: Yair Scott MD;  Location: [facility] ENDOSCOPY;  Service: General;  Laterality: N/A;  diverticulitis    HYSTERECTOMY      SUBTOTAL HYSTERECTOMY  01/2016    uterus    TRIGGER POINT INJECTION      TUBAL ABDOMINAL LIGATION  01/10/07     Family History   Problem Relation Age of Onset    Arthritis Mother     Depression Mother     Hyperlipidemia Mother     Cancer Paternal Grandmother     Diabetes Brother        Home Medications:  Prior to Admission medications    Medication Sig Start Date End Date Taking? Authorizing Provider   albuterol sulfate  (90 Base) MCG/ACT inhaler Inhale 2 puffs Every 4 (Four) Hours As Needed for Wheezing. 1/14/25   Gordo Campbell MD   amantadine (SYMMETREL) 100 MG tablet Take 1 tablet by mouth 2 (Two) Times a Day. 4/25/25   Meri Peterson MD   amLODIPine (NORVASC) 5 MG tablet Take 2 tablets by mouth Daily. 4/30/25   Gordo Campbell MD   cetirizine (zyrTEC) 10 MG tablet Take 1 tablet by mouth Daily. 4/11/25   Camilla Mena APRN   diclofenac (VOLTAREN) 50 MG EC tablet Take 1 tablet by mouth 2 (Two) Times a Day.  Patient not taking: Reported on 4/30/2025 4/11/25   Camilla Mena APRN   gabapentin (NEURONTIN) 100 MG capsule Take 1 capsule by mouth 3 (Three) Times a Day.  Patient taking differently: Take 1 capsule by mouth 3 (Three) Times a Day. PRN    Meri Peterson MD   hydroCHLOROthiazide 25 MG tablet TAKE 1 TABLET BY MOUTH DAILY 5/2/25   Camilla Mena APRN   Ibuprofen 3 %, Gabapentin 10 %, Baclofen 2 %, lidocaine 4 %, Ketamine HCl 4 % Apply 1-2 g topically to the appropriate area as directed 3 (Three) to 4 (Four) times daily. 7/1/24 7/1/25  William Frederick DPM   loperamide (Imodium A-D) 2 MG tablet Take 1 tablet by mouth 4 (Four) Times a Day As Needed for Diarrhea.  Patient not taking: Reported on 4/30/2025 4/28/25   Marcia Rolon APRN   methocarbamol (ROBAXIN) 750 MG tablet TAKE 1 TABLET BY MOUTH 3 TIMES A  "DAY 25   Camilla Mena APRN   PARoxetine (PAXIL) 10 MG tablet TAKE 1 TABLET BY MOUTH EVERY MORNING 6/10/25   Camilla Mena APRN   triamcinolone (KENALOG) 0.5 % cream Apply 1 Application topically to the appropriate area as directed 3 (Three) Times a Day As Needed for Irritation or Rash.    Provider, Historical, MD        Social History:   Social History     Tobacco Use    Smoking status: Former     Current packs/day: 0.00     Average packs/day: 1.5 packs/day for 15.0 years (22.5 ttl pk-yrs)     Types: Cigars, Cigarettes     Start date: 2013     Quit date: 10/21/2024     Years since quittin.6     Passive exposure: Past    Smokeless tobacco: Never   Vaping Use    Vaping status: Never Used   Substance Use Topics    Alcohol use: Not Currently    Drug use: Not Currently     Frequency: 3.0 times per week     Types: \"Crack\" cocaine, Marijuana     Comment: no longer uses cocaine         Review of Systems:  Review of Systems   Constitutional:  Negative for chills and fever.   HENT:  Negative for congestion, ear pain and sore throat.    Eyes:  Negative for pain.   Respiratory:  Negative for cough, chest tightness and shortness of breath.    Cardiovascular:  Negative for chest pain.   Gastrointestinal:  Negative for abdominal pain, diarrhea, nausea and vomiting.   Genitourinary:  Negative for flank pain and hematuria.   Musculoskeletal:  Negative for joint swelling.   Skin:  Positive for wound. Negative for pallor.   Neurological:  Negative for seizures and headaches.   All other systems reviewed and are negative.       Physical Exam:  /91   Pulse 50   Temp 98.2 °F (36.8 °C)   Resp 18   Ht 165.1 cm (65\")   Wt 98 kg (216 lb 0.8 oz)   SpO2 100%   BMI 35.95 kg/m²     Physical Exam  Vitals and nursing note reviewed.   Constitutional:       General: She is not in acute distress.     Appearance: Normal appearance. She is not toxic-appearing.   HENT:      Head: Normocephalic and atraumatic.      Jaw: " There is normal jaw occlusion.   Eyes:      General: Lids are normal.      Extraocular Movements: Extraocular movements intact.      Conjunctiva/sclera: Conjunctivae normal.      Pupils: Pupils are equal, round, and reactive to light.   Cardiovascular:      Rate and Rhythm: Normal rate and regular rhythm.      Pulses: Normal pulses.      Heart sounds: Normal heart sounds.   Pulmonary:      Effort: Pulmonary effort is normal. No respiratory distress.      Breath sounds: Normal breath sounds. No wheezing or rhonchi.   Abdominal:      General: Abdomen is flat.      Palpations: Abdomen is soft.      Tenderness: There is no abdominal tenderness. There is no guarding or rebound.   Musculoskeletal:         General: Normal range of motion.      Cervical back: Normal range of motion and neck supple.      Right lower leg: No edema.      Left lower leg: No edema.   Skin:     General: Skin is warm and dry.      Comments: 3 laparoscopic surgical sites are present the volar incision of the right shoulder appears clean dry and intact with sutures in place    The lateral incision is mild erythema but no purulent drainage no palpable fluid collection sutures in place no evidence of dehiscence    The dorsal incision of the right shoulder has mild erythema very minimal purulence but no palpable fluid collection and no dehiscence sutures remain in place   Neurological:      Mental Status: She is alert and oriented to person, place, and time. Mental status is at baseline.   Psychiatric:         Mood and Affect: Mood normal.             Medical Decision Making:      Comorbidities that affect care:    Hypertension, asthma, anxiety    External Notes reviewed:    Previous Clinic Note: Outpatient pulmonary visit for lung nodule 4/30/2025      The following orders were placed and all results were independently analyzed by me:  No orders of the defined types were placed in this encounter.      Medications Given in the Emergency  Department:  Medications   cephalexin (KEFLEX) capsule 500 mg (500 mg Oral Given 6/15/25 0010)        ED Course:    ED Course as of 06/15/25 0446   Sat Jun 14, 2025   4485 I discussed the patient's presentation and exam findings with Dr. Ahmet Fair of orthopedics at [facility].  He agrees with plan to initiate cephalexin therapy and have patient call office on Monday to arrange close follow-up. [JS]      ED Course User Index  [JS] Jeromy Vivas MD       Labs:    Lab Results (last 24 hours)       ** No results found for the last 24 hours. **             Imaging:    No Radiology Exams Resulted Within Past 24 Hours      Differential Diagnosis and Discussion:    Wound Evaluation: Differential diagnosis includes but is not limited to laceration, abrasion, puncture, burn, ulcer, cellulitis, abscess, vasculitis, malignancy, and rash.    PROCEDURES:        No orders to display       Procedures    MDM                     Patient Care Considerations:    NARCOTICS: I considered prescribing opiate pain medication as an outpatient, however pre-existing pain control prescription      Consultants/Shared Management Plan:    Consultant: I have discussed the case with Dr. Fair of orthopedic who states start oral antibiotics and have patient follow-up this coming week    Social Determinants of Health:    Patient has presented with family members who are responsible, reliable and will ensure follow up care.      Disposition and Care Coordination:    Discharged: The patient is suitable and stable for discharge with no need for consideration of admission.    I have explained the patient´s condition, diagnoses and treatment plan based on the information available to me at this time. I have answered questions and addressed any concerns. The patient has a good  understanding of the patient´s diagnosis, condition, and treatment plan as can be expected at this point. The vital signs have been stable. The patient´s  condition is stable and appropriate for discharge from the emergency department.      The patient will pursue further outpatient evaluation with the primary care physician or other designated or consulting physician as outlined in the discharge instructions. They are agreeable to this plan of care and follow-up instructions have been explained in detail. The patient has received these instructions in written format and has expressed an understanding of the discharge instructions. The patient is aware that any significant change in condition or worsening of symptoms should prompt an immediate return to this or the closest emergency department or call to 1.  I have explained discharge medications and the need for follow up with the patient/caretakers. This was also printed in the discharge instructions. Patient was discharged with the following medications and follow up:      Medication List        New Prescriptions      cephalexin 500 MG capsule  Commonly known as: KEFLEX  Take 1 capsule by mouth 4 (Four) Times a Day.            Changed      gabapentin 100 MG capsule  Commonly known as: NEURONTIN  What changed: additional instructions               Where to Get Your Medications        These medications were sent to [facility] [id number] - [place], [place] - [address] AT [facility] [address] - [phone] [facility] [phone]   [address] [address]      Phone: [phone]   cephalexin 500 MG capsule      Camilla Mena, APRN  [address] [id number] [phone]    Schedule an appointment as soon as possible for a visit       Kirstie Garza MD  [address] [facility] [address] [phone]    Schedule an appointment as soon as possible for a visit   Please call on Monday to arrange for close follow-up appointment       Final diagnoses:   Superficial incisional surgical site infection        ED Disposition       ED Disposition   Discharge    Condition    Stable    Comment   --               This medical record created using voice recognition software.             Jeromy Vivas MD  06/15/25 9035

## 2025-06-21 ENCOUNTER — HOSPITAL ENCOUNTER (EMERGENCY)
Facility: HOSPITAL | Age: 55
Discharge: HOME | End: 2025-06-22
Attending: EMERGENCY MEDICINE | Admitting: EMERGENCY MEDICINE
Payer: COMMERCIAL

## 2025-06-21 DIAGNOSIS — G89.18 POSTOPERATIVE PAIN: Primary | ICD-10-CM

## 2025-06-21 PROCEDURE — 99283 EMERGENCY DEPT VISIT LOW MDM: CPT

## 2025-06-22 VITALS
TEMPERATURE: 98.4 F | WEIGHT: 218.92 LBS | SYSTOLIC BLOOD PRESSURE: 138 MMHG | HEIGHT: 65 IN | OXYGEN SATURATION: 99 % | RESPIRATION RATE: 18 BRPM | BODY MASS INDEX: 36.47 KG/M2 | HEART RATE: 58 BPM | DIASTOLIC BLOOD PRESSURE: 82 MMHG

## 2025-06-22 LAB
ALBUMIN SERPL-MCNC: 4.3 G/DL (ref 3.5–5.2)
ALBUMIN/GLOB SERPL: 1.3 G/DL
ALP SERPL-CCNC: 64 U/L (ref 39–117)
ALT SERPL W P-5'-P-CCNC: 8 U/L (ref 1–33)
ANION GAP SERPL CALCULATED.3IONS-SCNC: 11.9 MMOL/L (ref 5–15)
AST SERPL-CCNC: 14 U/L (ref 1–32)
BASOPHILS # BLD AUTO: 0.05 10*3/MM3 (ref 0–0.2)
BASOPHILS NFR BLD AUTO: 0.5 % (ref 0–1.5)
BILIRUB SERPL-MCNC: 0.2 MG/DL (ref 0–1.2)
BUN SERPL-MCNC: 23.3 MG/DL (ref 6–20)
BUN/CREAT SERPL: 25.1 (ref 7–25)
CALCIUM SPEC-SCNC: 8.7 MG/DL (ref 8.6–10.5)
CHLORIDE SERPL-SCNC: 100 MMOL/L (ref 98–107)
CO2 SERPL-SCNC: 25.1 MMOL/L (ref 22–29)
CREAT SERPL-MCNC: 0.93 MG/DL (ref 0.57–1)
DEPRECATED RDW RBC AUTO: 49.3 FL (ref 37–54)
EGFRCR SERPLBLD CKD-EPI 2021: 72.7 ML/MIN/1.73
EOSINOPHIL # BLD AUTO: 0.2 10*3/MM3 (ref 0–0.4)
EOSINOPHIL NFR BLD AUTO: 2 % (ref 0.3–6.2)
ERYTHROCYTE [DISTWIDTH] IN BLOOD BY AUTOMATED COUNT: 14.6 % (ref 12.3–15.4)
GLOBULIN UR ELPH-MCNC: 3.4 GM/DL
GLUCOSE SERPL-MCNC: 121 MG/DL (ref 65–99)
HCT VFR BLD AUTO: 33.9 % (ref 34–46.6)
HGB BLD-MCNC: 11.1 G/DL (ref 12–15.9)
HOLD SPECIMEN: NORMAL
HOLD SPECIMEN: NORMAL
IMM GRANULOCYTES # BLD AUTO: 0.03 10*3/MM3 (ref 0–0.05)
IMM GRANULOCYTES NFR BLD AUTO: 0.3 % (ref 0–0.5)
LYMPHOCYTES # BLD AUTO: 3.22 10*3/MM3 (ref 0.7–3.1)
LYMPHOCYTES NFR BLD AUTO: 31.6 % (ref 19.6–45.3)
MCH RBC QN AUTO: 30.2 PG (ref 26.6–33)
MCHC RBC AUTO-ENTMCNC: 32.7 G/DL (ref 31.5–35.7)
MCV RBC AUTO: 92.4 FL (ref 79–97)
MONOCYTES # BLD AUTO: 0.75 10*3/MM3 (ref 0.1–0.9)
MONOCYTES NFR BLD AUTO: 7.4 % (ref 5–12)
NEUTROPHILS NFR BLD AUTO: 5.94 10*3/MM3 (ref 1.7–7)
NEUTROPHILS NFR BLD AUTO: 58.2 % (ref 42.7–76)
NRBC BLD AUTO-RTO: 0 /100 WBC (ref 0–0.2)
PLATELET # BLD AUTO: 289 10*3/MM3 (ref 140–450)
PMV BLD AUTO: 10.4 FL (ref 6–12)
POTASSIUM SERPL-SCNC: 3.8 MMOL/L (ref 3.5–5.2)
PROT SERPL-MCNC: 7.7 G/DL (ref 6–8.5)
RBC # BLD AUTO: 3.67 10*6/MM3 (ref 3.77–5.28)
SODIUM SERPL-SCNC: 137 MMOL/L (ref 136–145)
WBC NRBC COR # BLD AUTO: 10.19 10*3/MM3 (ref 3.4–10.8)
WHOLE BLOOD HOLD COAG: NORMAL
WHOLE BLOOD HOLD SPECIMEN: NORMAL

## 2025-06-22 PROCEDURE — 96374 THER/PROPH/DIAG INJ IV PUSH: CPT

## 2025-06-22 PROCEDURE — 85025 COMPLETE CBC W/AUTO DIFF WBC: CPT

## 2025-06-22 PROCEDURE — 80053 COMPREHEN METABOLIC PANEL: CPT

## 2025-06-22 PROCEDURE — 25010000002 HYDROMORPHONE 1 MG/ML SOLUTION: Performed by: EMERGENCY MEDICINE

## 2025-06-22 RX ORDER — HYDROCODONE BITARTRATE AND ACETAMINOPHEN 5; 325 MG/1; MG/1
1 TABLET ORAL EVERY 6 HOURS PRN
Qty: 12 TABLET | Refills: 0 | Status: SHIPPED | OUTPATIENT
Start: 2025-06-22

## 2025-06-22 RX ADMIN — HYDROMORPHONE HYDROCHLORIDE 1 MG: 1 INJECTION, SOLUTION INTRAMUSCULAR; INTRAVENOUS; SUBCUTANEOUS at 02:19

## 2025-06-22 NOTE — ED PROVIDER NOTES
Time: 2:07 AM EDT  Date of encounter:  2025  Independent Historian/Clinical History and Information was obtained by:   Patient    History is limited by: N/A    Chief Complaint: Arm pain      History of Present Illness:  Patient is a 55 y.o. year old female who presents to the emergency department for evaluation of postoperative arm pain and incision discharge.      Patient Care Team  Primary Care Provider: Camilla Mena APRN    Past Medical History:     Allergies   Allergen Reactions    Iodine Rash    Latex Rash     Past Medical History:   Diagnosis Date    Acid reflux     Acute appendicitis with localized peritonitis, without perforation, abscess, or gangrene 10/21/2024    Allergic 2016    Latex, hayfever    Anxiety     Arthritis     Arthritis of neck     Asthma     Breast mass     Bunion     Cervical disc disorder     Chronic pain     CTS (carpal tunnel syndrome)     Depression     Eczema     Foot pain, left     Fracture, foot     Hip arthrosis     History of transfusion     Hypertension     Knee swelling     Low back pain     Lumbosacral disc disease     Pneumonia     Rotator cuff syndrome     Scoliosis     Lower dosis    Tennis elbow     Toenail deformity     Visual impairment      Past Surgical History:   Procedure Laterality Date    APPENDECTOMY N/A 10/21/2024    Procedure: APPENDECTOMY LAPAROSCOPIC;  Surgeon: Yair Scott MD;  Location: [facility] MAIN OR;  Service: General;  Laterality: N/A;     SECTION      COLONOSCOPY N/A 2024    Procedure: COLONOSCOPY;  Surgeon: Yair Scott MD;  Location: [facility] ENDOSCOPY;  Service: General;  Laterality: N/A;  diverticulitis    HYSTERECTOMY      SUBTOTAL HYSTERECTOMY  2016    uterus    TRIGGER POINT INJECTION      TUBAL ABDOMINAL LIGATION  01/10/07     Family History   Problem Relation Age of Onset    Arthritis Mother     Depression Mother     Hyperlipidemia Mother     Cancer Paternal Grandmother     Diabetes Brother        Home  Medications:  Prior to Admission medications    Medication Sig Start Date End Date Taking? Authorizing Provider   albuterol sulfate  (90 Base) MCG/ACT inhaler Inhale 2 puffs Every 4 (Four) Hours As Needed for Wheezing. 1/14/25   Gordo Campbell MD   amantadine (SYMMETREL) 100 MG tablet Take 1 tablet by mouth 2 (Two) Times a Day. 4/25/25   Meri Peterson MD   amLODIPine (NORVASC) 5 MG tablet Take 2 tablets by mouth Daily. 4/30/25   Gordo Campbell MD   cephalexin (KEFLEX) 500 MG capsule Take 1 capsule by mouth 4 (Four) Times a Day. 6/14/25   Jeromy Vivas MD   cetirizine (zyrTEC) 10 MG tablet Take 1 tablet by mouth Daily. 4/11/25   Camilla Mena APRN   diclofenac (VOLTAREN) 50 MG EC tablet Take 1 tablet by mouth 2 (Two) Times a Day.  Patient not taking: Reported on 4/30/2025 4/11/25   Camilla Mena APRN   gabapentin (NEURONTIN) 100 MG capsule Take 1 capsule by mouth 3 (Three) Times a Day.  Patient taking differently: Take 1 capsule by mouth 3 (Three) Times a Day. PRN    Meri Peterson MD   hydroCHLOROthiazide 25 MG tablet TAKE 1 TABLET BY MOUTH DAILY 5/2/25   Camilla Mena APRN   HYDROcodone-acetaminophen (NORCO) 5-325 MG per tablet Take 1 tablet by mouth Every 6 (Six) Hours As Needed. 6/3/25   Meri Peterson MD   HYDROcodone-acetaminophen (NORCO) 5-325 MG per tablet Take 1 tablet by mouth Every 6 (Six) Hours As Needed (Pain). 6/22/25   Toni Avelar MD   ibuprofen (ADVIL,MOTRIN) 800 MG tablet Take 1 tablet by mouth Every 8 (Eight) Hours As Needed. 6/3/25   Meri Peterson MD   Ibuprofen 3 %, Gabapentin 10 %, Baclofen 2 %, lidocaine 4 %, Ketamine HCl 4 % Apply 1-2 g topically to the appropriate area as directed 3 (Three) to 4 (Four) times daily. 7/1/24 7/1/25  William Frederick DPM   loperamide (Imodium A-D) 2 MG tablet Take 1 tablet by mouth 4 (Four) Times a Day As Needed for Diarrhea.  Patient not taking: Reported on 4/30/2025 4/28/25   Marcia Rolon APRN  "  methocarbamol (ROBAXIN) 750 MG tablet TAKE 1 TABLET BY MOUTH 3 TIMES A DAY 25   Camilla Mena APRN   PARoxetine (PAXIL) 10 MG tablet TAKE 1 TABLET BY MOUTH EVERY MORNING 6/10/25   Camilla Mena APRN   triamcinolone (KENALOG) 0.5 % cream Apply 1 Application topically to the appropriate area as directed 3 (Three) Times a Day As Needed for Irritation or Rash.    Provider, Historical, MD        Social History:   Social History     Tobacco Use    Smoking status: Former     Current packs/day: 0.00     Average packs/day: 1.5 packs/day for 15.0 years (22.5 ttl pk-yrs)     Types: Cigars, Cigarettes     Start date: 2013     Quit date: 10/21/2024     Years since quittin.6     Passive exposure: Past    Smokeless tobacco: Never   Vaping Use    Vaping status: Never Used   Substance Use Topics    Alcohol use: Not Currently    Drug use: Not Currently     Frequency: 3.0 times per week     Types: \"Crack\" cocaine, Marijuana     Comment: no longer uses cocaine         Review of Systems:  Review of Systems   Constitutional:  Negative for chills and fever.   HENT:  Negative for congestion, rhinorrhea and sore throat.    Eyes:  Negative for pain and visual disturbance.   Respiratory:  Negative for apnea, cough, chest tightness and shortness of breath.    Cardiovascular:  Negative for chest pain and palpitations.   Gastrointestinal:  Negative for abdominal pain, diarrhea, nausea and vomiting.   Genitourinary:  Negative for difficulty urinating and dysuria.   Musculoskeletal:  Negative for joint swelling and myalgias.   Skin:  Negative for color change.   Neurological:  Negative for seizures and headaches.   Psychiatric/Behavioral: Negative.     All other systems reviewed and are negative.       Physical Exam:  /82   Pulse 58   Temp 98.4 °F (36.9 °C) (Oral)   Resp 18   Ht 165.1 cm (65\")   Wt 99.3 kg (218 lb 14.7 oz)   SpO2 99%   BMI 36.43 kg/m²     Physical Exam  Vitals and nursing note reviewed. "   Constitutional:       General: She is not in acute distress.     Appearance: Normal appearance. She is not toxic-appearing.   HENT:      Head: Normocephalic and atraumatic.      Jaw: There is normal jaw occlusion.   Eyes:      General: Lids are normal.      Extraocular Movements: Extraocular movements intact.      Conjunctiva/sclera: Conjunctivae normal.      Pupils: Pupils are equal, round, and reactive to light.   Cardiovascular:      Rate and Rhythm: Normal rate and regular rhythm.      Pulses: Normal pulses.      Heart sounds: Normal heart sounds.   Pulmonary:      Effort: Pulmonary effort is normal. No respiratory distress.      Breath sounds: Normal breath sounds. No wheezing or rhonchi.   Abdominal:      General: Abdomen is flat.      Palpations: Abdomen is soft.      Tenderness: There is no abdominal tenderness. There is no guarding or rebound.   Musculoskeletal:         General: Normal range of motion.      Cervical back: Normal range of motion and neck supple.      Right lower leg: No edema.      Left lower leg: No edema.   Skin:     General: Skin is warm and dry.      Comments: Incisions to the right shoulder that are clean dry and intact   Neurological:      Mental Status: She is alert and oriented to person, place, and time. Mental status is at baseline.   Psychiatric:         Mood and Affect: Mood normal.                    Medical Decision Making:      Comorbidities that affect care:    Hypertension    External Notes reviewed:    Previous Clinic Note: Orthopedic surgery office visit for postop surgical wound management      The following orders were placed and all results were independently analyzed by me:  Orders Placed This Encounter   Procedures    Comprehensive Metabolic Panel    [place] Draw    CBC Auto Differential    CBC & Differential    Green Top (Gel)    Lavender Top    Gold Top - SST    Light Blue Top       Medications Given in the Emergency Department:  Medications   HYDROmorphone  (DILAUDID) injection 1 mg (1 mg Intravenous Given 6/22/25 0219)        ED Course:         Labs:    Lab Results (last 24 hours)       Procedure Component Value Units Date/Time    CBC & Differential [[id number]]  (Abnormal) Collected: 06/22/25 0026    Specimen: Blood Updated: 06/22/25 0032    Narrative:      The following orders were created for panel order CBC & Differential.  Procedure                               Abnormality         Status                     ---------                               -----------         ------                     CBC Auto Differential[[id number]]        Abnormal            Final result                 Please view results for these tests on the individual orders.    Comprehensive Metabolic Panel [[id number]]  (Abnormal) Collected: 06/22/25 0026    Specimen: Blood Updated: 06/22/25 0049     Glucose 121 mg/dL      BUN 23.3 mg/dL      Creatinine 0.93 mg/dL      Sodium 137 mmol/L      Potassium 3.8 mmol/L      Chloride 100 mmol/L      CO2 25.1 mmol/L      Calcium 8.7 mg/dL      Total Protein 7.7 g/dL      Albumin 4.3 g/dL      ALT (SGPT) 8 U/L      AST (SGOT) 14 U/L      Alkaline Phosphatase 64 U/L      Total Bilirubin 0.2 mg/dL      Globulin 3.4 gm/dL      A/G Ratio 1.3 g/dL      BUN/Creatinine Ratio 25.1     Anion Gap 11.9 mmol/L      eGFR 72.7 mL/min/1.73     Narrative:      GFR Categories in Chronic Kidney Disease (CKD)              GFR Category          GFR (mL/min/1.73)    Interpretation  G1                    90 or greater        Normal or high (1)  G2                    60-89                Mild decrease (1)  G3a                   45-59                Mild to moderate decrease  G3b                   30-44                Moderate to severe decrease  G4                    15-29                Severe decrease  G5                    14 or less           Kidney failure    (1)In the absence of evidence of kidney disease, neither GFR category G1 or G2 fulfill the criteria for CKD.    eGFR  calculation 2021 CKD-EPI creatinine equation, which does not include race as a factor    CBC Auto Differential [[id number]]  (Abnormal) Collected: 06/22/25 0026    Specimen: Blood Updated: 06/22/25 0032     WBC 10.19 10*3/mm3      RBC 3.67 10*6/mm3      Hemoglobin 11.1 g/dL      Hematocrit 33.9 %      MCV 92.4 fL      MCH 30.2 pg      MCHC 32.7 g/dL      RDW 14.6 %      RDW-SD 49.3 fl      MPV 10.4 fL      Platelets 289 10*3/mm3      Neutrophil % 58.2 %      Lymphocyte % 31.6 %      Monocyte % 7.4 %      Eosinophil % 2.0 %      Basophil % 0.5 %      Immature Grans % 0.3 %      Neutrophils, Absolute 5.94 10*3/mm3      Lymphocytes, Absolute 3.22 10*3/mm3      Monocytes, Absolute 0.75 10*3/mm3      Eosinophils, Absolute 0.20 10*3/mm3      Basophils, Absolute 0.05 10*3/mm3      Immature Grans, Absolute 0.03 10*3/mm3      nRBC 0.0 /100 WBC              Imaging:    No Radiology Exams Resulted Within Past 24 Hours      Differential Diagnosis and Discussion:    Extremity Pain: Differential diagnosis includes but is not limited to soft tissue sprain, tendonitis, tendon injury, dislocation, fracture, deep vein thrombosis, arterial insufficiency, osteoarthritis, bursitis, and ligamentous damage.    PROCEDURES:    Labs were collected in the emergency department and all labs were reviewed and interpreted by me.    No orders to display       Procedures    MDM  Number of Diagnoses or Management Options  Postoperative pain  Diagnosis management comments: In summary this is a 55-year-old female who presents for evaluation of right arm pain status post right shoulder arthroscopy.  She does report some drainage from the posterior incision however it is dry today and she has been taking antibiotics.  No surrounding erythema or induration.  CBC independently reviewed and interpreted by me and shows no critical abnormalities.  CMP independently reviewed and interpreted by me and shows no critical abnormalities.  Patient vies to continue  taking her antibiotics and she has been given a prescription for pain medication.  Very strict return to ER and follow-up instructions have been provided to the patient.                         Patient Care Considerations:    CT EXTREMITY: I considered ordering an extremity CT, however no signs of vascular compromise      Consultants/Shared Management Plan:    None    Social Determinants of Health:    Patient is independent, reliable, and has access to care.       Disposition and Care Coordination:    Discharged: The patient is suitable and stable for discharge with no need for consideration of admission.    I have explained the patient´s condition, diagnoses and treatment plan based on the information available to me at this time. I have answered questions and addressed any concerns. The patient has a good  understanding of the patient´s diagnosis, condition, and treatment plan as can be expected at this point. The vital signs have been stable. The patient´s condition is stable and appropriate for discharge from the emergency department.      The patient will pursue further outpatient evaluation with the primary care physician or other designated or consulting physician as outlined in the discharge instructions. They are agreeable to this plan of care and follow-up instructions have been explained in detail. The patient has received these instructions in written format and has expressed an understanding of the discharge instructions. The patient is aware that any significant change in condition or worsening of symptoms should prompt an immediate return to this or the closest emergency department or call to 911.  I have explained discharge medications and the need for follow up with the patient/caretakers. This was also printed in the discharge instructions. Patient was discharged with the following medications and follow up:      Medication List        Changed      gabapentin 100 MG capsule  Commonly known as:  NEURONTIN  What changed: additional instructions     * HYDROcodone-acetaminophen 5-325 MG per tablet  Commonly known as: NORCO  What changed: Another medication with the same name was added. Make sure you understand how and when to take each.     * HYDROcodone-acetaminophen 5-325 MG per tablet  Commonly known as: NORCO  Take 1 tablet by mouth Every 6 (Six) Hours As Needed (Pain).  What changed: You were already taking a medication with the same name, and this prescription was added. Make sure you understand how and when to take each.           * This list has 2 medication(s) that are the same as other medications prescribed for you. Read the directions carefully, and ask your doctor or other care provider to review them with you.                   Where to Get Your Medications        These medications were sent to [facility]/pharmacy #[id number] - [place], [place] - [address] - [phone]  - [phone] FX  [address] [place] [address]      Hours: 24-hours Phone: [phone]   HYDROcodone-acetaminophen 5-325 MG per tablet      Camilla Mena, APRN  [facility] [address] [id number] [phone]    In 1 week         Final diagnoses:   Postoperative pain        ED Disposition       ED Disposition   Discharge    Condition   Stable    Comment   --               This medical record created using voice recognition software.             Toni Avelar MD  06/22/25 8945

## 2025-07-18 DIAGNOSIS — G89.29 OTHER CHRONIC PAIN: ICD-10-CM

## 2025-07-18 DIAGNOSIS — S76.012D HIP STRAIN, LEFT, SUBSEQUENT ENCOUNTER: ICD-10-CM

## 2025-07-22 RX ORDER — METHOCARBAMOL 750 MG/1
750 TABLET, FILM COATED ORAL 3 TIMES DAILY
Qty: 30 TABLET | Refills: 0 | Status: SHIPPED | OUTPATIENT
Start: 2025-07-22

## 2025-07-22 RX ORDER — METHYLPREDNISOLONE 4 MG/1
TABLET ORAL
Qty: 21 TABLET | Refills: 0 | OUTPATIENT
Start: 2025-07-22

## 2025-08-07 DIAGNOSIS — I10 PRIMARY HYPERTENSION: ICD-10-CM

## 2025-08-08 RX ORDER — AMLODIPINE BESYLATE 5 MG/1
10 TABLET ORAL DAILY
Qty: 180 TABLET | Refills: 2 | Status: SHIPPED | OUTPATIENT
Start: 2025-08-08

## 2025-08-15 ENCOUNTER — OFFICE VISIT (OUTPATIENT)
Dept: FAMILY MEDICINE CLINIC | Facility: CLINIC | Age: 55
End: 2025-08-15
Payer: COMMERCIAL

## 2025-08-15 VITALS
HEART RATE: 64 BPM | HEIGHT: 65 IN | BODY MASS INDEX: 37.52 KG/M2 | WEIGHT: 225.2 LBS | DIASTOLIC BLOOD PRESSURE: 79 MMHG | OXYGEN SATURATION: 100 % | SYSTOLIC BLOOD PRESSURE: 133 MMHG

## 2025-08-15 DIAGNOSIS — E66.812 CLASS 2 SEVERE OBESITY DUE TO EXCESS CALORIES WITH SERIOUS COMORBIDITY AND BODY MASS INDEX (BMI) OF 37.0 TO 37.9 IN ADULT: Primary | ICD-10-CM

## 2025-08-15 DIAGNOSIS — E66.01 CLASS 2 SEVERE OBESITY DUE TO EXCESS CALORIES WITH SERIOUS COMORBIDITY AND BODY MASS INDEX (BMI) OF 37.0 TO 37.9 IN ADULT: Primary | ICD-10-CM

## 2025-08-15 DIAGNOSIS — I10 PRIMARY HYPERTENSION: ICD-10-CM

## 2025-08-15 PROCEDURE — 3075F SYST BP GE 130 - 139MM HG: CPT

## 2025-08-15 PROCEDURE — 1160F RVW MEDS BY RX/DR IN RCRD: CPT

## 2025-08-15 PROCEDURE — 1159F MED LIST DOCD IN RCRD: CPT

## 2025-08-15 PROCEDURE — 3078F DIAST BP <80 MM HG: CPT

## 2025-08-15 PROCEDURE — 99214 OFFICE O/P EST MOD 30 MIN: CPT

## 2025-08-15 PROCEDURE — 1126F AMNT PAIN NOTED NONE PRSNT: CPT

## 2025-08-20 DIAGNOSIS — G89.29 OTHER CHRONIC PAIN: ICD-10-CM

## 2025-08-20 RX ORDER — METHOCARBAMOL 750 MG/1
750 TABLET, FILM COATED ORAL 3 TIMES DAILY
Qty: 30 TABLET | Refills: 0 | Status: SHIPPED | OUTPATIENT
Start: 2025-08-20

## (undated) DEVICE — TROCAR: Brand: KII® SLEEVE

## (undated) DEVICE — GLV SURG SENSICARE PI ORTHO SZ6.5 LF STRL

## (undated) DEVICE — CONN JET HYDRA H20 AUXILIARY DISP

## (undated) DEVICE — INTENDED FOR TISSUE SEPARATION, AND OTHER PROCEDURES THAT REQUIRE A SHARP SURGICAL BLADE TO PUNCTURE OR CUT.: Brand: BARD-PARKER ® CARBON RIB-BACK BLADES

## (undated) DEVICE — ECHELON FLEX POWERED PLUS ARTICULATING ENDOSCOPIC LINEAR CUTTER , 60MM: Brand: ECHELON FLEX

## (undated) DEVICE — SUT PDS2 0 CT1 27IN Z340H MF VIL

## (undated) DEVICE — SOL IRR NACL 0.9PCT BO 1000ML

## (undated) DEVICE — Device: Brand: DEFENDO AIR/WATER/SUCTION AND BIOPSY VALVE

## (undated) DEVICE — GOWN,REINFRCE,POLY,SIRUS,BREATH SLV,XXLG: Brand: MEDLINE

## (undated) DEVICE — LAPAROSCOPIC SCISSORS: Brand: EPIX LAPAROSCOPIC SCISSORS

## (undated) DEVICE — ADHS SKIN SURG TISS VISC PREMIERPRO EXOFIN HI/VISC FAST/DRY

## (undated) DEVICE — GENERAL LAPAROSCOPY-LF: Brand: MEDLINE INDUSTRIES, INC.

## (undated) DEVICE — STERILE POLYISOPRENE POWDER-FREE SURGICAL GLOVES WITH EMOLLIENT COATING: Brand: PROTEXIS

## (undated) DEVICE — SOL IRR NACL 0.9PCT BT 1000ML

## (undated) DEVICE — TROCARS: Brand: KII® BALLOON BLUNT TIP SYSTEM

## (undated) DEVICE — 2, DISPOSABLE SUCTION/IRRIGATOR WITHOUT DISPOSABLE TIP: Brand: STRYKEFLOW

## (undated) DEVICE — TISSUE RETRIEVAL SYSTEM: Brand: INZII RETRIEVAL SYSTEM

## (undated) DEVICE — SLV SCD KN/LEN ADJ EXPRSS BLENDED MD 1P/U

## (undated) DEVICE — LAPAROVUE VISIBILITY SYSTEM LAPAROSCOPIC SOLUTIONS: Brand: LAPAROVUE

## (undated) DEVICE — SOLIDIFIER LIQLOC PLS 1500CC BT

## (undated) DEVICE — HARMONIC 700 SHEARS, ADVANCED HEMOSTASIS: Brand: HARMONIC

## (undated) DEVICE — Device

## (undated) DEVICE — SUT MNCRYL PLS ANTIB UD 4/0 PS2 18IN

## (undated) DEVICE — STERILE POLYISOPRENE POWDER-FREE SURGICAL GLOVES: Brand: PROTEXIS

## (undated) DEVICE — SOL IRRG H2O PL/BG 1000ML STRL

## (undated) DEVICE — LINER SURG CANSTR SXN S/RIGD 1500CC

## (undated) DEVICE — SOL IRR NACL 0.9PCT 1000ML

## (undated) DEVICE — LAPAROSCOPIC TROCAR SLEEVE/SINGLE USE: Brand: KII® OPTICAL ACCESS SYSTEM

## (undated) DEVICE — GLV SURG SENSICARE PI ORTHO SZ8 LF STRL